# Patient Record
Sex: FEMALE | Race: WHITE | NOT HISPANIC OR LATINO | Employment: UNEMPLOYED | URBAN - METROPOLITAN AREA
[De-identification: names, ages, dates, MRNs, and addresses within clinical notes are randomized per-mention and may not be internally consistent; named-entity substitution may affect disease eponyms.]

---

## 2024-08-08 ENCOUNTER — HOSPITAL ENCOUNTER (INPATIENT)
Facility: HOSPITAL | Age: 44
LOS: 3 days | Discharge: NURSING, REHAB OR LONG-TERM CARE | End: 2024-08-13
Attending: STUDENT IN AN ORGANIZED HEALTH CARE EDUCATION/TRAINING PROGRAM | Admitting: INTERNAL MEDICINE
Payer: MEDICAID

## 2024-08-08 DIAGNOSIS — D69.6 THROMBOCYTOPENIA: ICD-10-CM

## 2024-08-08 DIAGNOSIS — K70.31 ALCOHOLIC CIRRHOSIS OF LIVER WITH ASCITES: Primary | ICD-10-CM

## 2024-08-08 DIAGNOSIS — N39.0 URINARY TRACT INFECTION WITHOUT HEMATURIA, SITE UNSPECIFIED: ICD-10-CM

## 2024-08-08 DIAGNOSIS — R60.1 ANASARCA: ICD-10-CM

## 2024-08-08 DIAGNOSIS — I95.9 HYPOTENSION, UNSPECIFIED HYPOTENSION TYPE: ICD-10-CM

## 2024-08-08 DIAGNOSIS — D64.9 ANEMIA, UNSPECIFIED TYPE: ICD-10-CM

## 2024-08-08 DIAGNOSIS — R74.8 ELEVATED LIVER ENZYMES: ICD-10-CM

## 2024-08-08 LAB
HOLD SPECIMEN: NORMAL
HOLD SPECIMEN: NORMAL
WHOLE BLOOD HOLD COAG: NORMAL
WHOLE BLOOD HOLD SPECIMEN: NORMAL

## 2024-08-08 PROCEDURE — 81001 URINALYSIS AUTO W/SCOPE: CPT

## 2024-08-08 PROCEDURE — 83880 ASSAY OF NATRIURETIC PEPTIDE: CPT

## 2024-08-08 PROCEDURE — 81025 URINE PREGNANCY TEST: CPT

## 2024-08-08 PROCEDURE — 82105 ALPHA-FETOPROTEIN SERUM: CPT | Performed by: NURSE PRACTITIONER

## 2024-08-08 PROCEDURE — 85025 COMPLETE CBC W/AUTO DIFF WBC: CPT

## 2024-08-08 PROCEDURE — 80053 COMPREHEN METABOLIC PANEL: CPT

## 2024-08-08 PROCEDURE — 25010000002 THIAMINE PER 100 MG

## 2024-08-08 PROCEDURE — 25010000002 ONDANSETRON PER 1 MG

## 2024-08-08 PROCEDURE — 99285 EMERGENCY DEPT VISIT HI MDM: CPT

## 2024-08-08 PROCEDURE — 25010000002 MORPHINE PER 10 MG

## 2024-08-08 PROCEDURE — 85007 BL SMEAR W/DIFF WBC COUNT: CPT

## 2024-08-08 PROCEDURE — 25810000003 SODIUM CHLORIDE 0.9 % SOLUTION

## 2024-08-08 PROCEDURE — 83690 ASSAY OF LIPASE: CPT

## 2024-08-08 PROCEDURE — 36415 COLL VENOUS BLD VENIPUNCTURE: CPT

## 2024-08-08 RX ORDER — FOLIC ACID 1 MG/1
1 TABLET ORAL DAILY
Status: DISCONTINUED | OUTPATIENT
Start: 2024-08-09 | End: 2024-08-13 | Stop reason: HOSPADM

## 2024-08-08 RX ORDER — LORAZEPAM 2 MG/ML
2 INJECTION INTRAMUSCULAR
Status: DISCONTINUED | OUTPATIENT
Start: 2024-08-08 | End: 2024-08-13 | Stop reason: HOSPADM

## 2024-08-08 RX ORDER — SODIUM CHLORIDE 0.9 % (FLUSH) 0.9 %
10 SYRINGE (ML) INJECTION AS NEEDED
Status: DISCONTINUED | OUTPATIENT
Start: 2024-08-08 | End: 2024-08-13 | Stop reason: HOSPADM

## 2024-08-08 RX ORDER — LORAZEPAM 1 MG/1
1 TABLET ORAL DAILY
Status: COMPLETED | OUTPATIENT
Start: 2024-08-12 | End: 2024-08-12

## 2024-08-08 RX ORDER — LORAZEPAM 1 MG/1
2 TABLET ORAL EVERY 6 HOURS
Status: DISPENSED | OUTPATIENT
Start: 2024-08-08 | End: 2024-08-09

## 2024-08-08 RX ORDER — THIAMINE HYDROCHLORIDE 100 MG/ML
200 INJECTION, SOLUTION INTRAMUSCULAR; INTRAVENOUS EVERY 8 HOURS SCHEDULED
Status: DISCONTINUED | OUTPATIENT
Start: 2024-08-08 | End: 2024-08-13 | Stop reason: HOSPADM

## 2024-08-08 RX ORDER — LORAZEPAM 2 MG/ML
1 INJECTION INTRAMUSCULAR
Status: DISCONTINUED | OUTPATIENT
Start: 2024-08-08 | End: 2024-08-13 | Stop reason: HOSPADM

## 2024-08-08 RX ORDER — LORAZEPAM 1 MG/1
1 TABLET ORAL EVERY 6 HOURS
Status: COMPLETED | OUTPATIENT
Start: 2024-08-09 | End: 2024-08-10

## 2024-08-08 RX ORDER — LORAZEPAM 1 MG/1
2 TABLET ORAL
Status: DISCONTINUED | OUTPATIENT
Start: 2024-08-08 | End: 2024-08-13 | Stop reason: HOSPADM

## 2024-08-08 RX ORDER — LORAZEPAM 1 MG/1
1 TABLET ORAL
Status: DISCONTINUED | OUTPATIENT
Start: 2024-08-08 | End: 2024-08-13 | Stop reason: HOSPADM

## 2024-08-08 RX ORDER — LORAZEPAM 1 MG/1
1 TABLET ORAL EVERY 12 HOURS SCHEDULED
Status: COMPLETED | OUTPATIENT
Start: 2024-08-10 | End: 2024-08-11

## 2024-08-08 RX ORDER — ONDANSETRON 2 MG/ML
4 INJECTION INTRAMUSCULAR; INTRAVENOUS ONCE
Status: COMPLETED | OUTPATIENT
Start: 2024-08-08 | End: 2024-08-08

## 2024-08-08 RX ADMIN — ONDANSETRON 4 MG: 2 INJECTION INTRAMUSCULAR; INTRAVENOUS at 23:55

## 2024-08-08 RX ADMIN — SODIUM CHLORIDE 1000 ML: 9 INJECTION, SOLUTION INTRAVENOUS at 23:53

## 2024-08-08 RX ADMIN — MORPHINE SULFATE 4 MG: 4 INJECTION, SOLUTION INTRAMUSCULAR; INTRAVENOUS at 23:57

## 2024-08-08 RX ADMIN — THIAMINE HYDROCHLORIDE 200 MG: 100 INJECTION, SOLUTION INTRAMUSCULAR; INTRAVENOUS at 23:58

## 2024-08-08 NOTE — Clinical Note
Level of Care: Telemetry [5]   Admitting Physician: LLANES ALVAREZ, CARLOS [[id number]]   Attending Physician: LLANES ALVAREZ, CARLOS [[id number]]

## 2024-08-09 ENCOUNTER — APPOINTMENT (OUTPATIENT)
Dept: ULTRASOUND IMAGING | Facility: HOSPITAL | Age: 44
End: 2024-08-09
Payer: MEDICAID

## 2024-08-09 ENCOUNTER — INPATIENT HOSPITAL (OUTPATIENT)
Dept: URBAN - METROPOLITAN AREA HOSPITAL 84 | Facility: HOSPITAL | Age: 44
End: 2024-08-09
Payer: MEDICAID

## 2024-08-09 ENCOUNTER — APPOINTMENT (OUTPATIENT)
Dept: CT IMAGING | Facility: HOSPITAL | Age: 44
End: 2024-08-09
Payer: MEDICAID

## 2024-08-09 ENCOUNTER — APPOINTMENT (OUTPATIENT)
Dept: INFUSION THERAPY | Facility: HOSPITAL | Age: 44
End: 2024-08-09
Payer: MEDICAID

## 2024-08-09 DIAGNOSIS — I85.00 ESOPHAGEAL VARICES WITHOUT BLEEDING: ICD-10-CM

## 2024-08-09 DIAGNOSIS — K70.11 ALCOHOLIC HEPATITIS WITH ASCITES: ICD-10-CM

## 2024-08-09 DIAGNOSIS — K70.31 ALCOHOLIC CIRRHOSIS OF LIVER WITH ASCITES: ICD-10-CM

## 2024-08-09 DIAGNOSIS — B19.20 UNSPECIFIED VIRAL HEPATITIS C WITHOUT HEPATIC COMA: ICD-10-CM

## 2024-08-09 DIAGNOSIS — K76.82 HEPATIC ENCEPHALOPATHY: ICD-10-CM

## 2024-08-09 DIAGNOSIS — D61.818 OTHER PANCYTOPENIA: ICD-10-CM

## 2024-08-09 DIAGNOSIS — D72.825 BANDEMIA: ICD-10-CM

## 2024-08-09 DIAGNOSIS — F10.10 ALCOHOL ABUSE, UNCOMPLICATED: ICD-10-CM

## 2024-08-09 PROBLEM — D63.8 ANEMIA OF CHRONIC DISEASE: Status: ACTIVE | Noted: 2024-07-29

## 2024-08-09 PROBLEM — K52.9 COLITIS: Status: ACTIVE | Noted: 2024-07-17

## 2024-08-09 PROBLEM — R18.8 CIRRHOSIS OF LIVER WITH ASCITES: Chronic | Status: ACTIVE | Noted: 2024-08-09

## 2024-08-09 PROBLEM — N94.89 PELVIC CONGESTION SYNDROME: Status: ACTIVE | Noted: 2023-07-28

## 2024-08-09 PROBLEM — K74.60 CIRRHOSIS OF LIVER WITH ASCITES: Chronic | Status: ACTIVE | Noted: 2024-08-09

## 2024-08-09 PROBLEM — B18.2 CHRONIC HEPATITIS C VIRUS INFECTION: Chronic | Status: ACTIVE | Noted: 2022-11-08

## 2024-08-09 PROBLEM — K70.10 ALCOHOLIC HEPATITIS: Status: ACTIVE | Noted: 2024-07-19

## 2024-08-09 PROBLEM — R79.89 ELEVATED LFTS: Chronic | Status: RESOLVED | Noted: 2019-11-06 | Resolved: 2024-08-09

## 2024-08-09 PROBLEM — K21.9 CHRONIC GERD: Status: ACTIVE | Noted: 2024-08-09

## 2024-08-09 PROBLEM — A41.9 SEPSIS WITHOUT ACUTE ORGAN DYSFUNCTION: Status: ACTIVE | Noted: 2022-11-03

## 2024-08-09 PROBLEM — I49.9 ARRHYTHMIA: Status: ACTIVE | Noted: 2024-08-09

## 2024-08-09 PROBLEM — D69.6 THROMBOCYTOPENIA: Status: ACTIVE | Noted: 2024-08-09

## 2024-08-09 PROBLEM — R60.1 ANASARCA: Status: ACTIVE | Noted: 2024-08-09

## 2024-08-09 PROBLEM — R18.8 CIRRHOSIS OF LIVER WITH ASCITES: Status: ACTIVE | Noted: 2024-08-09

## 2024-08-09 PROBLEM — K70.10 ALCOHOLIC HEPATITIS: Status: RESOLVED | Noted: 2024-07-19 | Resolved: 2024-08-09

## 2024-08-09 PROBLEM — B18.2 CHRONIC HEPATITIS C VIRUS INFECTION: Status: ACTIVE | Noted: 2022-11-08

## 2024-08-09 PROBLEM — J38.3 LESION OF VOCAL FOLD: Status: ACTIVE | Noted: 2024-07-29

## 2024-08-09 PROBLEM — K74.60 CIRRHOSIS OF LIVER WITH ASCITES: Status: ACTIVE | Noted: 2024-08-09

## 2024-08-09 PROBLEM — F33.1 MAJOR DEPRESSIVE DISORDER, RECURRENT EPISODE, MODERATE DEGREE: Chronic | Status: ACTIVE | Noted: 2024-02-17

## 2024-08-09 PROBLEM — D53.9 MACROCYTIC ANEMIA: Status: ACTIVE | Noted: 2024-08-09

## 2024-08-09 PROBLEM — I95.9 HYPOTENSION: Status: ACTIVE | Noted: 2024-08-09

## 2024-08-09 PROBLEM — K52.9 COLITIS: Status: RESOLVED | Noted: 2024-07-17 | Resolved: 2024-08-09

## 2024-08-09 PROBLEM — D63.8 ANEMIA OF CHRONIC DISEASE: Chronic | Status: ACTIVE | Noted: 2024-07-29

## 2024-08-09 PROBLEM — G62.9 NEUROPATHY: Status: ACTIVE | Noted: 2024-08-09

## 2024-08-09 PROBLEM — R79.89 ELEVATED LFTS: Chronic | Status: ACTIVE | Noted: 2019-11-06

## 2024-08-09 PROBLEM — I81 PORTAL VEIN THROMBOSIS: Status: ACTIVE | Noted: 2024-07-11

## 2024-08-09 PROBLEM — Z72.0 TOBACCO ABUSE: Chronic | Status: ACTIVE | Noted: 2020-05-16

## 2024-08-09 PROBLEM — A41.9 SEPSIS WITHOUT ACUTE ORGAN DYSFUNCTION: Status: RESOLVED | Noted: 2022-11-03 | Resolved: 2024-08-09

## 2024-08-09 LAB
ALBUMIN SERPL-MCNC: 2.6 G/DL (ref 3.5–5.2)
ALBUMIN SERPL-MCNC: 2.8 G/DL (ref 3.5–5.2)
ALBUMIN/GLOB SERPL: 0.7 G/DL
ALP SERPL-CCNC: 131 U/L (ref 39–117)
ALP SERPL-CCNC: 166 U/L (ref 39–117)
ALPHA-FETOPROTEIN: 5.83 NG/ML (ref 0–8.3)
ALT SERPL W P-5'-P-CCNC: 53 U/L (ref 1–33)
ALT SERPL W P-5'-P-CCNC: 61 U/L (ref 1–33)
AMMONIA BLD-SCNC: 125 UMOL/L (ref 11–51)
ANION GAP SERPL CALCULATED.3IONS-SCNC: 7.3 MMOL/L (ref 5–15)
ANION GAP SERPL CALCULATED.3IONS-SCNC: 9.6 MMOL/L (ref 5–15)
ANISOCYTOSIS BLD QL: ABNORMAL
ANISOCYTOSIS BLD QL: ABNORMAL
APPEARANCE FLD: CLEAR
AST SERPL-CCNC: 103 U/L (ref 1–32)
AST SERPL-CCNC: 121 U/L (ref 1–32)
B-HCG UR QL: NEGATIVE
BACTERIA UR QL AUTO: ABNORMAL /HPF
BASOPHILS # BLD MANUAL: 0.1 10*3/MM3 (ref 0–0.2)
BASOPHILS # BLD MANUAL: 0.13 10*3/MM3 (ref 0–0.2)
BASOPHILS NFR BLD MANUAL: 3 % (ref 0–1.5)
BASOPHILS NFR BLD MANUAL: 3 % (ref 0–1.5)
BILIRUB SERPL-MCNC: 3.8 MG/DL (ref 0–1.2)
BILIRUB SERPL-MCNC: 3.9 MG/DL (ref 0–1.2)
BILIRUB UR QL STRIP: ABNORMAL
BUN SERPL-MCNC: 12 MG/DL (ref 6–20)
BUN SERPL-MCNC: 9 MG/DL (ref 6–20)
BUN/CREAT SERPL: 10.5 (ref 7–25)
BUN/CREAT SERPL: 13.3 (ref 7–25)
CALCIUM SPEC-SCNC: 7.7 MG/DL (ref 8.6–10.5)
CALCIUM SPEC-SCNC: 8 MG/DL (ref 8.6–10.5)
CHLORIDE SERPL-SCNC: 106 MMOL/L (ref 98–107)
CHLORIDE SERPL-SCNC: 109 MMOL/L (ref 98–107)
CLARITY UR: ABNORMAL
CO2 SERPL-SCNC: 20.7 MMOL/L (ref 22–29)
CO2 SERPL-SCNC: 21.4 MMOL/L (ref 22–29)
COLOR FLD: NORMAL
COLOR UR: ABNORMAL
CREAT SERPL-MCNC: 0.86 MG/DL (ref 0.57–1)
CREAT SERPL-MCNC: 0.9 MG/DL (ref 0.57–1)
DEPRECATED RDW RBC AUTO: 62.7 FL (ref 37–54)
DEPRECATED RDW RBC AUTO: 63.2 FL (ref 37–54)
EGFRCR SERPLBLD CKD-EPI 2021: 80.5 ML/MIN/1.73
EGFRCR SERPLBLD CKD-EPI 2021: 85 ML/MIN/1.73
ELLIPTOCYTES BLD QL SMEAR: ABNORMAL
EOSINOPHIL # BLD MANUAL: 0.1 10*3/MM3 (ref 0–0.4)
EOSINOPHIL # BLD MANUAL: 0.22 10*3/MM3 (ref 0–0.4)
EOSINOPHIL NFR BLD MANUAL: 3 % (ref 0.3–6.2)
EOSINOPHIL NFR BLD MANUAL: 5 % (ref 0.3–6.2)
ERYTHROCYTE [DISTWIDTH] IN BLOOD BY AUTOMATED COUNT: 15.3 % (ref 12.3–15.4)
ERYTHROCYTE [DISTWIDTH] IN BLOOD BY AUTOMATED COUNT: 15.3 % (ref 12.3–15.4)
GLOBULIN UR ELPH-MCNC: 3.9 GM/DL
GLUCOSE SERPL-MCNC: 97 MG/DL (ref 65–99)
GLUCOSE SERPL-MCNC: 97 MG/DL (ref 65–99)
GLUCOSE UR STRIP-MCNC: NEGATIVE MG/DL
HAV IGM SERPL QL IA: ABNORMAL
HBV CORE IGM SERPL QL IA: ABNORMAL
HBV SURFACE AG SERPL QL IA: ABNORMAL
HCT VFR BLD AUTO: 26.4 % (ref 34–46.6)
HCT VFR BLD AUTO: 28.3 % (ref 34–46.6)
HCV AB SER QL: REACTIVE
HGB BLD-MCNC: 8.2 G/DL (ref 12–15.9)
HGB BLD-MCNC: 9.2 G/DL (ref 12–15.9)
HGB UR QL STRIP.AUTO: NEGATIVE
HYALINE CASTS UR QL AUTO: ABNORMAL /LPF
INR PPP: 1.5 (ref 0.93–1.1)
KETONES UR QL STRIP: ABNORMAL
LDH FLD-CCNC: 78 U/L
LEUKOCYTE ESTERASE UR QL STRIP.AUTO: ABNORMAL
LIPASE SERPL-CCNC: 57 U/L (ref 13–60)
LYMPHOCYTES # BLD MANUAL: 1.61 10*3/MM3 (ref 0.7–3.1)
LYMPHOCYTES # BLD MANUAL: 2.22 10*3/MM3 (ref 0.7–3.1)
LYMPHOCYTES NFR BLD MANUAL: 6 % (ref 5–12)
LYMPHOCYTES NFR BLD MANUAL: 8 % (ref 5–12)
LYMPHOCYTES NFR FLD MANUAL: 49 %
MACROCYTES BLD QL SMEAR: ABNORMAL
MACROCYTES BLD QL SMEAR: ABNORMAL
MAGNESIUM SERPL-MCNC: 1.6 MG/DL (ref 1.6–2.6)
MCH RBC QN AUTO: 34.9 PG (ref 26.6–33)
MCH RBC QN AUTO: 36.2 PG (ref 26.6–33)
MCHC RBC AUTO-ENTMCNC: 31.1 G/DL (ref 31.5–35.7)
MCHC RBC AUTO-ENTMCNC: 32.5 G/DL (ref 31.5–35.7)
MCV RBC AUTO: 111.4 FL (ref 79–97)
MCV RBC AUTO: 112.3 FL (ref 79–97)
MESOTHL CELL NFR FLD MANUAL: 12 %
METAMYELOCYTES NFR BLD MANUAL: 1 % (ref 0–0)
METHOD: NORMAL
MONOCYTES # BLD: 0.19 10*3/MM3 (ref 0.1–0.9)
MONOCYTES # BLD: 0.35 10*3/MM3 (ref 0.1–0.9)
MONOCYTES NFR FLD: 37 %
NEUTROPHILS # BLD AUTO: 1.19 10*3/MM3 (ref 1.7–7)
NEUTROPHILS # BLD AUTO: 1.51 10*3/MM3 (ref 1.7–7)
NEUTROPHILS NFR BLD MANUAL: 34 % (ref 42.7–76)
NEUTROPHILS NFR BLD MANUAL: 35 % (ref 42.7–76)
NEUTROPHILS NFR FLD MANUAL: 2 %
NEUTS BAND NFR BLD MANUAL: 2 % (ref 0–5)
NITRITE UR QL STRIP: POSITIVE
NT-PROBNP SERPL-MCNC: 154 PG/ML (ref 0–450)
NUC CELL # FLD: 119 /MM3
PH UR STRIP.AUTO: <=5 [PH] (ref 5–8)
PLATELET # BLD AUTO: 63 10*3/MM3 (ref 140–450)
PLATELET # BLD AUTO: 74 10*3/MM3 (ref 140–450)
PMV BLD AUTO: 10 FL (ref 6–12)
PMV BLD AUTO: 11.8 FL (ref 6–12)
POIKILOCYTOSIS BLD QL SMEAR: ABNORMAL
POTASSIUM SERPL-SCNC: 3.9 MMOL/L (ref 3.5–5.2)
POTASSIUM SERPL-SCNC: 4.2 MMOL/L (ref 3.5–5.2)
PROT SERPL-MCNC: 6.7 G/DL (ref 6–8.5)
PROT UR QL STRIP: ABNORMAL
PROTHROMBIN TIME: 15.9 SECONDS (ref 9.6–11.7)
RBC # BLD AUTO: 2.35 10*6/MM3 (ref 3.77–5.28)
RBC # BLD AUTO: 2.54 10*6/MM3 (ref 3.77–5.28)
RBC # UR STRIP: ABNORMAL /HPF
REF LAB TEST METHOD: ABNORMAL
SCAN SLIDE: NORMAL
SCAN SLIDE: NORMAL
SMALL PLATELETS BLD QL SMEAR: ABNORMAL
SMALL PLATELETS BLD QL SMEAR: ABNORMAL
SODIUM SERPL-SCNC: 137 MMOL/L (ref 136–145)
SODIUM SERPL-SCNC: 137 MMOL/L (ref 136–145)
SP GR UR STRIP: 1.03 (ref 1–1.03)
SQUAMOUS #/AREA URNS HPF: ABNORMAL /HPF
TARGETS BLD QL SMEAR: ABNORMAL
TSH SERPL DL<=0.05 MIU/L-ACNC: 2.88 UIU/ML (ref 0.27–4.2)
UROBILINOGEN UR QL STRIP: ABNORMAL
VARIANT LYMPHS NFR BLD MANUAL: 1 % (ref 0–5)
VARIANT LYMPHS NFR BLD MANUAL: 3 % (ref 0–5)
VARIANT LYMPHS NFR BLD MANUAL: 47 % (ref 19.6–45.3)
VARIANT LYMPHS NFR BLD MANUAL: 49 % (ref 19.6–45.3)
WBC # UR STRIP: ABNORMAL /HPF
WBC MORPH BLD: NORMAL
WBC MORPH BLD: NORMAL
WBC NRBC COR # BLD AUTO: 3.21 10*3/MM3 (ref 3.4–10.8)
WBC NRBC COR # BLD AUTO: 4.43 10*3/MM3 (ref 3.4–10.8)

## 2024-08-09 PROCEDURE — 82140 ASSAY OF AMMONIA: CPT

## 2024-08-09 PROCEDURE — 87070 CULTURE OTHR SPECIMN AEROBIC: CPT | Performed by: NURSE PRACTITIONER

## 2024-08-09 PROCEDURE — 85610 PROTHROMBIN TIME: CPT | Performed by: NURSE PRACTITIONER

## 2024-08-09 PROCEDURE — 82040 ASSAY OF SERUM ALBUMIN: CPT | Performed by: NURSE PRACTITIONER

## 2024-08-09 PROCEDURE — 87205 SMEAR GRAM STAIN: CPT | Performed by: NURSE PRACTITIONER

## 2024-08-09 PROCEDURE — 25010000002 CEFTRIAXONE PER 250 MG

## 2024-08-09 PROCEDURE — 87902 NFCT AGT GNTYP ALYS HEP C: CPT | Performed by: NURSE PRACTITIONER

## 2024-08-09 PROCEDURE — 84443 ASSAY THYROID STIM HORMONE: CPT | Performed by: NURSE PRACTITIONER

## 2024-08-09 PROCEDURE — 93005 ELECTROCARDIOGRAM TRACING: CPT | Performed by: INTERNAL MEDICINE

## 2024-08-09 PROCEDURE — G0378 HOSPITAL OBSERVATION PER HR: HCPCS

## 2024-08-09 PROCEDURE — 84450 TRANSFERASE (AST) (SGOT): CPT | Performed by: NURSE PRACTITIONER

## 2024-08-09 PROCEDURE — 85025 COMPLETE CBC W/AUTO DIFF WBC: CPT

## 2024-08-09 PROCEDURE — 80074 ACUTE HEPATITIS PANEL: CPT

## 2024-08-09 PROCEDURE — 82247 BILIRUBIN TOTAL: CPT | Performed by: NURSE PRACTITIONER

## 2024-08-09 PROCEDURE — 25010000002 FUROSEMIDE PER 20 MG: Performed by: INTERNAL MEDICINE

## 2024-08-09 PROCEDURE — 85007 BL SMEAR W/DIFF WBC COUNT: CPT

## 2024-08-09 PROCEDURE — 84075 ASSAY ALKALINE PHOSPHATASE: CPT | Performed by: NURSE PRACTITIONER

## 2024-08-09 PROCEDURE — 76705 ECHO EXAM OF ABDOMEN: CPT

## 2024-08-09 PROCEDURE — 25510000001 IOPAMIDOL PER 1 ML

## 2024-08-09 PROCEDURE — 25010000002 THIAMINE PER 100 MG

## 2024-08-09 PROCEDURE — 83615 LACTATE (LD) (LDH) ENZYME: CPT | Performed by: NURSE PRACTITIONER

## 2024-08-09 PROCEDURE — 99222 1ST HOSP IP/OBS MODERATE 55: CPT | Performed by: INTERNAL MEDICINE

## 2024-08-09 PROCEDURE — 87522 HEPATITIS C REVRS TRNSCRPJ: CPT | Performed by: NURSE PRACTITIONER

## 2024-08-09 PROCEDURE — 25010000002 HYDROMORPHONE 1 MG/ML SOLUTION: Performed by: NURSE PRACTITIONER

## 2024-08-09 PROCEDURE — 88108 CYTOPATH CONCENTRATE TECH: CPT | Performed by: NURSE PRACTITIONER

## 2024-08-09 PROCEDURE — 83735 ASSAY OF MAGNESIUM: CPT | Performed by: NURSE PRACTITIONER

## 2024-08-09 PROCEDURE — 80048 BASIC METABOLIC PNL TOTAL CA: CPT

## 2024-08-09 PROCEDURE — 76942 ECHO GUIDE FOR BIOPSY: CPT

## 2024-08-09 PROCEDURE — 84460 ALANINE AMINO (ALT) (SGPT): CPT | Performed by: NURSE PRACTITIONER

## 2024-08-09 PROCEDURE — 99222 1ST HOSP IP/OBS MODERATE 55: CPT | Performed by: NURSE PRACTITIONER

## 2024-08-09 PROCEDURE — 74177 CT ABD & PELVIS W/CONTRAST: CPT

## 2024-08-09 PROCEDURE — 93010 ELECTROCARDIOGRAM REPORT: CPT | Performed by: INTERNAL MEDICINE

## 2024-08-09 PROCEDURE — 89051 BODY FLUID CELL COUNT: CPT | Performed by: NURSE PRACTITIONER

## 2024-08-09 RX ORDER — BISACODYL 5 MG/1
5 TABLET, DELAYED RELEASE ORAL DAILY PRN
Status: DISCONTINUED | OUTPATIENT
Start: 2024-08-09 | End: 2024-08-13 | Stop reason: HOSPADM

## 2024-08-09 RX ORDER — LACTULOSE 10 G/15ML
30 SOLUTION ORAL 3 TIMES DAILY
Status: DISCONTINUED | OUTPATIENT
Start: 2024-08-09 | End: 2024-08-13 | Stop reason: HOSPADM

## 2024-08-09 RX ORDER — ALBUMIN (HUMAN) 12.5 G/50ML
112.5 SOLUTION INTRAVENOUS ONCE
Status: DISCONTINUED | OUTPATIENT
Start: 2024-08-09 | End: 2024-08-12

## 2024-08-09 RX ORDER — ZINC SULFATE 50(220)MG
220 CAPSULE ORAL DAILY
Status: DISCONTINUED | OUTPATIENT
Start: 2024-08-09 | End: 2024-08-13 | Stop reason: HOSPADM

## 2024-08-09 RX ORDER — ALBUMIN (HUMAN) 12.5 G/50ML
62.5 SOLUTION INTRAVENOUS ONCE
Status: DISCONTINUED | OUTPATIENT
Start: 2024-08-09 | End: 2024-08-12

## 2024-08-09 RX ORDER — LIDOCAINE HYDROCHLORIDE 10 MG/ML
10 INJECTION, SOLUTION INFILTRATION; PERINEURAL ONCE
Status: DISCONTINUED | OUTPATIENT
Start: 2024-08-09 | End: 2024-08-13 | Stop reason: HOSPADM

## 2024-08-09 RX ORDER — PROPRANOLOL HYDROCHLORIDE 10 MG/1
10 TABLET ORAL 3 TIMES DAILY
Status: ON HOLD | COMMUNITY
End: 2024-08-12

## 2024-08-09 RX ORDER — ONDANSETRON 2 MG/ML
4 INJECTION INTRAMUSCULAR; INTRAVENOUS EVERY 6 HOURS PRN
Status: DISCONTINUED | OUTPATIENT
Start: 2024-08-09 | End: 2024-08-13 | Stop reason: HOSPADM

## 2024-08-09 RX ORDER — ALBUMIN (HUMAN) 12.5 G/50ML
37.5 SOLUTION INTRAVENOUS ONCE
Status: DISCONTINUED | OUTPATIENT
Start: 2024-08-09 | End: 2024-08-12

## 2024-08-09 RX ORDER — POLYETHYLENE GLYCOL 3350 17 G/17G
17 POWDER, FOR SOLUTION ORAL DAILY PRN
Status: DISCONTINUED | OUTPATIENT
Start: 2024-08-09 | End: 2024-08-13 | Stop reason: HOSPADM

## 2024-08-09 RX ORDER — SPIRONOLACTONE 100 MG/1
100 TABLET, FILM COATED ORAL DAILY
Status: ON HOLD | COMMUNITY
End: 2024-08-12

## 2024-08-09 RX ORDER — SODIUM CHLORIDE 0.9 % (FLUSH) 0.9 %
10 SYRINGE (ML) INJECTION EVERY 12 HOURS SCHEDULED
Status: DISCONTINUED | OUTPATIENT
Start: 2024-08-09 | End: 2024-08-13 | Stop reason: HOSPADM

## 2024-08-09 RX ORDER — HYDROXYZINE HYDROCHLORIDE 25 MG/1
25 TABLET, FILM COATED ORAL 3 TIMES DAILY PRN
Status: ON HOLD | COMMUNITY
End: 2024-08-12

## 2024-08-09 RX ORDER — ALBUMIN (HUMAN) 12.5 G/50ML
100 SOLUTION INTRAVENOUS ONCE
Status: DISCONTINUED | OUTPATIENT
Start: 2024-08-09 | End: 2024-08-12

## 2024-08-09 RX ORDER — ALBUMIN (HUMAN) 12.5 G/50ML
87.5 SOLUTION INTRAVENOUS ONCE
Status: DISCONTINUED | OUTPATIENT
Start: 2024-08-09 | End: 2024-08-12

## 2024-08-09 RX ORDER — LACTULOSE 10 G/15ML
SOLUTION ORAL; RECTAL
Status: ON HOLD | COMMUNITY
Start: 2024-07-22 | End: 2024-08-12

## 2024-08-09 RX ORDER — FUROSEMIDE 10 MG/ML
40 INJECTION INTRAMUSCULAR; INTRAVENOUS
Status: DISCONTINUED | OUTPATIENT
Start: 2024-08-09 | End: 2024-08-13 | Stop reason: HOSPADM

## 2024-08-09 RX ORDER — FUROSEMIDE 40 MG/1
40 TABLET ORAL DAILY
Status: ON HOLD | COMMUNITY
End: 2024-08-12

## 2024-08-09 RX ORDER — SODIUM CHLORIDE 9 MG/ML
40 INJECTION, SOLUTION INTRAVENOUS AS NEEDED
Status: DISCONTINUED | OUTPATIENT
Start: 2024-08-09 | End: 2024-08-13 | Stop reason: HOSPADM

## 2024-08-09 RX ORDER — PANTOPRAZOLE SODIUM 40 MG/1
40 TABLET, DELAYED RELEASE ORAL 2 TIMES DAILY
Status: ON HOLD | COMMUNITY
End: 2024-08-12

## 2024-08-09 RX ORDER — MIRTAZAPINE 30 MG/1
30 TABLET, FILM COATED ORAL
Status: ON HOLD | COMMUNITY
Start: 2024-07-30 | End: 2024-08-12

## 2024-08-09 RX ORDER — TRAMADOL HYDROCHLORIDE 50 MG/1
50 TABLET ORAL EVERY 6 HOURS PRN
Status: DISCONTINUED | OUTPATIENT
Start: 2024-08-09 | End: 2024-08-13 | Stop reason: HOSPADM

## 2024-08-09 RX ORDER — AMOXICILLIN 250 MG
2 CAPSULE ORAL 2 TIMES DAILY PRN
Status: DISCONTINUED | OUTPATIENT
Start: 2024-08-09 | End: 2024-08-13 | Stop reason: HOSPADM

## 2024-08-09 RX ORDER — ALBUMIN (HUMAN) 12.5 G/50ML
75 SOLUTION INTRAVENOUS ONCE
Status: DISCONTINUED | OUTPATIENT
Start: 2024-08-09 | End: 2024-08-12

## 2024-08-09 RX ORDER — SODIUM CHLORIDE 0.9 % (FLUSH) 0.9 %
10 SYRINGE (ML) INJECTION AS NEEDED
Status: DISCONTINUED | OUTPATIENT
Start: 2024-08-09 | End: 2024-08-13 | Stop reason: HOSPADM

## 2024-08-09 RX ORDER — BISACODYL 10 MG
10 SUPPOSITORY, RECTAL RECTAL DAILY PRN
Status: DISCONTINUED | OUTPATIENT
Start: 2024-08-09 | End: 2024-08-13 | Stop reason: HOSPADM

## 2024-08-09 RX ORDER — GABAPENTIN 300 MG/1
300 CAPSULE ORAL 3 TIMES DAILY
Status: ON HOLD | COMMUNITY
End: 2024-08-12

## 2024-08-09 RX ORDER — MIDODRINE HYDROCHLORIDE 5 MG/1
10 TABLET ORAL EVERY 8 HOURS
Status: DISCONTINUED | OUTPATIENT
Start: 2024-08-09 | End: 2024-08-13

## 2024-08-09 RX ORDER — ALBUMIN (HUMAN) 12.5 G/50ML
50 SOLUTION INTRAVENOUS ONCE
Status: DISCONTINUED | OUTPATIENT
Start: 2024-08-09 | End: 2024-08-12

## 2024-08-09 RX ORDER — FOLIC ACID 1 MG/1
1 TABLET ORAL DAILY
Status: ON HOLD | COMMUNITY
End: 2024-08-12

## 2024-08-09 RX ORDER — OXYCODONE HYDROCHLORIDE 5 MG/1
5 CAPSULE ORAL EVERY 4 HOURS PRN
Status: ON HOLD | COMMUNITY
End: 2024-08-12

## 2024-08-09 RX ORDER — HYDROCODONE BITARTRATE AND ACETAMINOPHEN 5; 325 MG/1; MG/1
1 TABLET ORAL EVERY 6 HOURS PRN
Status: ON HOLD | COMMUNITY
End: 2024-08-12

## 2024-08-09 RX ORDER — SORBITOL SOLUTION 70 %
30 SOLUTION, ORAL MISCELLANEOUS ONCE
Status: COMPLETED | OUTPATIENT
Start: 2024-08-09 | End: 2024-08-09

## 2024-08-09 RX ADMIN — Medication 10 ML: at 06:09

## 2024-08-09 RX ADMIN — Medication 5 MG: at 20:20

## 2024-08-09 RX ADMIN — RIFAXIMIN 550 MG: 550 TABLET ORAL at 09:01

## 2024-08-09 RX ADMIN — TRAMADOL HYDROCHLORIDE 50 MG: 50 TABLET ORAL at 09:15

## 2024-08-09 RX ADMIN — Medication 10 ML: at 09:01

## 2024-08-09 RX ADMIN — FUROSEMIDE 40 MG: 10 INJECTION, SOLUTION INTRAMUSCULAR; INTRAVENOUS at 18:04

## 2024-08-09 RX ADMIN — MIDODRINE HYDROCHLORIDE 10 MG: 5 TABLET ORAL at 11:03

## 2024-08-09 RX ADMIN — Medication 10 ML: at 20:21

## 2024-08-09 RX ADMIN — LACTULOSE 30 G: 10 SOLUTION ORAL at 16:32

## 2024-08-09 RX ADMIN — CEFTRIAXONE 2000 MG: 2 INJECTION, POWDER, FOR SOLUTION INTRAMUSCULAR; INTRAVENOUS at 01:30

## 2024-08-09 RX ADMIN — IOPAMIDOL 100 ML: 755 INJECTION, SOLUTION INTRAVENOUS at 01:20

## 2024-08-09 RX ADMIN — MIDODRINE HYDROCHLORIDE 10 MG: 5 TABLET ORAL at 04:29

## 2024-08-09 RX ADMIN — LORAZEPAM 1 MG: 1 TABLET ORAL at 09:01

## 2024-08-09 RX ADMIN — THIAMINE HYDROCHLORIDE 200 MG: 100 INJECTION, SOLUTION INTRAMUSCULAR; INTRAVENOUS at 22:35

## 2024-08-09 RX ADMIN — THIAMINE HYDROCHLORIDE 200 MG: 100 INJECTION, SOLUTION INTRAMUSCULAR; INTRAVENOUS at 13:54

## 2024-08-09 RX ADMIN — HYDROMORPHONE HYDROCHLORIDE 1 MG: 1 INJECTION, SOLUTION INTRAMUSCULAR; INTRAVENOUS; SUBCUTANEOUS at 20:20

## 2024-08-09 RX ADMIN — FOLIC ACID 1 MG: 1 TABLET ORAL at 09:01

## 2024-08-09 RX ADMIN — FUROSEMIDE 40 MG: 10 INJECTION, SOLUTION INTRAMUSCULAR; INTRAVENOUS at 09:02

## 2024-08-09 RX ADMIN — TRAMADOL HYDROCHLORIDE 50 MG: 50 TABLET ORAL at 16:32

## 2024-08-09 RX ADMIN — ZINC SULFATE 220 MG (50 MG) CAPSULE 220 MG: CAPSULE at 16:36

## 2024-08-09 RX ADMIN — SORBITOL SOLUTION (BULK) 30 ML: 70 SOLUTION at 04:30

## 2024-08-09 RX ADMIN — LORAZEPAM 2 MG: 1 TABLET ORAL at 05:49

## 2024-08-09 RX ADMIN — LORAZEPAM 2 MG: 1 TABLET ORAL at 00:00

## 2024-08-09 RX ADMIN — RIFAXIMIN 550 MG: 550 TABLET ORAL at 20:20

## 2024-08-09 RX ADMIN — MIDODRINE HYDROCHLORIDE 10 MG: 5 TABLET ORAL at 18:04

## 2024-08-09 RX ADMIN — LACTULOSE 30 G: 10 SOLUTION ORAL at 09:01

## 2024-08-09 RX ADMIN — THIAMINE HYDROCHLORIDE 200 MG: 100 INJECTION, SOLUTION INTRAMUSCULAR; INTRAVENOUS at 05:49

## 2024-08-09 NOTE — NURSING NOTE
Report called to [facility] on 2A . Called ambulatory to let them know patient will go to room 201 after para.

## 2024-08-09 NOTE — NURSING NOTE
Contacted attending MD, Dr. Thornton via secure chat to inform him of noted bradycardia upon arrival to unit. Dr. Thornton instructed this nurse to monitor patient. Will continue to monitor patient closely.

## 2024-08-09 NOTE — NURSING NOTE
This nurse paged Dr. Thornton to inform him of patients heart rhythm change to AFIB with varying heart rate between 50-80. New telephone order for 1.) EKG 2.) consult cardiology. Orders repeated and placed in epic. Cardiology consult called to on-call provider Dr. Corbin.

## 2024-08-09 NOTE — CONSULTS
Referring Provider: Shay Thornton MD    Reason for Consultation: Atrial fibrillation      Patient Care Team:  Provider, No Known as PCP - General      SUBJECTIVE     Chief Complaint: Abdominal pain and swelling    History of present illness:  Maria Elena Byrnes is a 43 y.o. female with history of alcohol abuse, alcoholic cirrhosis with ascites and esophageal varices, hepatitis C and tobacco abuse who presented to the hospital with complaints of abdominal pain and swelling.  Workup revealed anasarca, hypotension, elevated LFTs, hyperbilirubinemia, anemia, thrombocytopenia and possible UTI.  GI is managing.  Cardiology has been consulted for rhythm management as patient appears to have gone into atrial fibrillation.    Review of systems:  Unable to obtain due to altered mental state      Personal History:      Past Medical History:   Diagnosis Date    Alcohol abuse 11/08/2022    Anemia of chronic disease 07/29/2024    Chronic GERD 08/09/2024    Chronic hepatitis C virus infection 11/08/2022    HAVP Negative 11/03/2022     HEPBSAG Negative 11/03/2022     HEPBCAB Negative 11/03/2022     HEPC10 Positive (A) 11/03/2022             Cirrhosis     Colitis 07/17/2024    Elevated LFTs 11/06/2019    Esophageal varices     Lesion of vocal fold 07/29/2024    Major depressive disorder, recurrent episode, moderate degree 02/17/2024    Neuropathy 08/09/2024    Pelvic congestion syndrome 07/28/2023    Portal vein thrombosis 07/11/2024    Sepsis without acute organ dysfunction 11/03/2022       History reviewed. No pertinent surgical history.    Family History   Family history unknown: Yes       Social History     Tobacco Use    Smoking status: Every Day     Types: Cigarettes    Smokeless tobacco: Current   Vaping Use    Vaping status: Every Day    Substances: Nicotine, THC, CBD, Flavoring    Devices: Disposable   Substance Use Topics    Alcohol use: Yes     Comment: Daily - 1-2 pints a day currently    Drug use: Yes     Types: Marijuana         Home meds:  Prior to Admission medications    Medication Sig Start Date End Date Taking? Authorizing Provider   folic acid (FOLVITE) 1 MG tablet Take 1 tablet by mouth Daily.  Patient not taking: Reported on 8/9/2024    Varsha Costello MD   furosemide (LASIX) 40 MG tablet Take 1 tablet by mouth Daily.  Patient not taking: Reported on 8/9/2024    Varsha Costello MD   gabapentin (NEURONTIN) 300 MG capsule Take 1 capsule by mouth 3 (Three) Times a Day.  Patient not taking: Reported on 8/9/2024    Varsha Costello MD   HYDROcodone-acetaminophen (NORCO) 5-325 MG per tablet Take 1 tablet by mouth Every 6 (Six) Hours As Needed.  Patient not taking: Reported on 8/9/2024    Varsha Costello MD   hydrOXYzine (ATARAX) 25 MG tablet Take 1 tablet by mouth 3 (Three) Times a Day As Needed for Itching.  Patient not taking: Reported on 8/9/2024    Varsha Costello MD   lactulose (CHRONULAC) 10 GM/15ML solution solution (encephalopathy)  7/22/24   Varsha Costello MD   mirtazapine (REMERON) 30 MG tablet 1 tablet.  Patient not taking: Reported on 8/9/2024 7/30/24   Varsha Costello MD   nicotine polacrilex (NICORETTE) 2 MG gum Take 1 each by mouth Every 2 (Two) Hours As Needed.  Patient not taking: Reported on 8/9/2024 7/30/24 10/28/24  Varsha Costello MD   oxyCODONE (OXY-IR) 5 MG capsule Take 1 capsule by mouth Every 4 (Four) Hours As Needed for Moderate Pain.  Patient not taking: Reported on 8/9/2024    Varsha Costello MD   pantoprazole (PROTONIX) 40 MG EC tablet Take 1 tablet by mouth 2 (Two) Times a Day.  Patient not taking: Reported on 8/9/2024    Varsha Costello MD   propranolol (INDERAL) 10 MG tablet Take 1 tablet by mouth 3 (Three) Times a Day.  Patient not taking: Reported on 8/9/2024    Varsha Costello MD   spironolactone (ALDACTONE) 100 MG tablet Take 1 tablet by mouth Daily.  Patient not taking: Reported on 8/9/2024    Varsha Costello MD        Allergies:     Patient has no known allergies.    Scheduled Meds:albumin human, 37.5 g, Intravenous, Once   Or  albumin human, 50 g, Intravenous, Once   Or  albumin human, 62.5 g, Intravenous, Once   Or  albumin human, 75 g, Intravenous, Once   Or  albumin human, 87.5 g, Intravenous, Once   Or  albumin human, 100 g, Intravenous, Once   Or  albumin human, 112.5 g, Intravenous, Once  [START ON 8/10/2024] cefTRIAXone, 2,000 mg, Intravenous, Daily  folic acid, 1 mg, Oral, Daily  furosemide, 40 mg, Intravenous, BID  lactulose, 30 g, Oral, TID  lidocaine, 10 mL, Subcutaneous, Once  LORazepam, 2 mg, Oral, Q6H   Followed by  LORazepam, 1 mg, Oral, Q6H   Followed by  [START ON 8/10/2024] LORazepam, 1 mg, Oral, Q12H   Followed by  [START ON 8/12/2024] LORazepam, 1 mg, Oral, Daily  midodrine, 10 mg, Oral, Q8H  rifAXIMin, 550 mg, Oral, Q12H  sodium chloride, 10 mL, Intravenous, Q12H  thiamine (B-1) IV, 200 mg, Intravenous, Q8H   Followed by  [START ON 8/14/2024] thiamine, 100 mg, Oral, Daily  zinc sulfate, 220 mg, Oral, Daily      Continuous Infusions:   PRN Meds:  senna-docusate sodium **AND** polyethylene glycol **AND** bisacodyl **AND** bisacodyl    Calcium Replacement - Follow Nurse / BPA Driven Protocol    HYDROmorphone    LORazepam **OR** LORazepam **OR** LORazepam **OR** LORazepam **OR** LORazepam **OR** LORazepam    Magnesium Standard Dose Replacement - Follow Nurse / BPA Driven Protocol    melatonin    ondansetron    phenol    Phosphorus Replacement - Follow Nurse / BPA Driven Protocol    Potassium Replacement - Follow Nurse / BPA Driven Protocol    sodium chloride    sodium chloride    sodium chloride    traMADol      OBJECTIVE    Vital Signs  Vitals:    08/09/24 1230 08/09/24 1245 08/09/24 1300 08/09/24 1342   BP: 106/65 96/56 95/58 112/71   BP Location:    Left arm   Patient Position:    Lying   Pulse: 59 58 59    Resp: 12 10 9    Temp:   97.6 °F (36.4 °C)    TempSrc:   Oral    SpO2: 96% 96% 96% 95%   Weight:   "     Height:           Flowsheet Rows      Flowsheet Row First Filed Value   Admission Height 170.2 cm (67\") Documented at 08/08/2024 2109   Admission Weight 93.4 kg (205 lb 14.6 oz) Documented at 08/08/2024 2109              Intake/Output Summary (Last 24 hours) at 8/9/2024 1552  Last data filed at 8/9/2024 1500  Gross per 24 hour   Intake --   Output 1200 ml   Net -1200 ml        Telemetry: Sinus bradycardia    Physical Exam:  The patient is lethargic and falling asleep midsentence.  No acute distress noted, chronically ill-appearing  Vital signs as noted above.  Head and neck revealed no carotid bruits or jugular venous distention.  No thyromegaly or lymphadenopathy is present  Lungs clear with diminished bases.  No wheezing.  Currently on room air  Heart: Normal first and second heart sounds. No murmur.  No precordial rub is present.  No gallop is present.  Abdomen: distended and tender  Extremities with good peripheral pulses with bilateral lower extremity edema  Skin: Warm and dry.  Musculoskeletal system is grossly normal.  CNS: Lethargic      Results Review:  I have personally reviewed the results from the time of this admission to 8/9/2024 15:52 EDT and agree with these findings:  [x]  Laboratory  [x]  Microbiology  [x]  Radiology  [x]  EKG/Telemetry   [x]  Cardiology/Vascular   []  Pathology  [x]  Old records  []  Other:    Most notable findings include:     Lab Results (last 24 hours)       Procedure Component Value Units Date/Time    TSH [[id number]] Collected: 08/09/24 1355    Specimen: Blood Updated: 08/09/24 1546    Magnesium [[id number]] Collected: 08/09/24 1355    Specimen: Blood Updated: 08/09/24 1546    Non-gynecologic Cytology [[id number]] Collected: 08/09/24 1225    Specimen: Peritoneal Fluid from Peritoneum Updated: 08/09/24 1434    Alkaline Phosphatase [[id number]]  (Abnormal) Collected: 08/09/24 1355    Specimen: Blood Updated: 08/09/24 1425     Alkaline Phosphatase 131 U/L     Albumin " [[id number]]  (Abnormal) Collected: 08/09/24 1355    Specimen: Blood Updated: 08/09/24 1425     Albumin 2.6 g/dL     AST [[id number]]  (Abnormal) Collected: 08/09/24 1355    Specimen: Blood Updated: 08/09/24 1425     AST (SGOT) 103 U/L     ALT [[id number]]  (Abnormal) Collected: 08/09/24 1355    Specimen: Blood Updated: 08/09/24 1425     ALT (SGPT) 53 U/L     Bilirubin, Total [[id number]]  (Abnormal) Collected: 08/09/24 1355    Specimen: Blood Updated: 08/09/24 1425     Total Bilirubin 3.8 mg/dL     Body Fluid Culture - Body Fluid, Peritoneum [[id number]] Collected: 08/09/24 1225    Specimen: Body Fluid from Peritoneum Updated: 08/09/24 1405     Gram Stain Rare (1+) WBCs seen      No organisms seen    Body Fluid Cell Count With Differential - Body Fluid, Peritoneum [[id number]] Collected: 08/09/24 1225    Specimen: Body Fluid from Peritoneum Updated: 08/09/24 1402    Narrative:      The following orders were created for panel order Body Fluid Cell Count With Differential - Body Fluid, Peritoneum.  Procedure                               Abnormality         Status                     ---------                               -----------         ------                     Body fluid cell count - ...[[id number]]                      Final result               Body fluid differential ...[[id number]]                      Final result                 Please view results for these tests on the individual orders.    Body fluid differential - Body Fluid, Peritoneum [[id number]] Collected: 08/09/24 1225    Specimen: Body Fluid from Peritoneum Updated: 08/09/24 1402     Neutrophils, Fluid 2 %      Lymphocytes, Fluid 49 %      Monocytes, Fluid 37 %      Mesothelial Cells, Fluid 12 %     Narrative:      Concentrated Smear by Cytocentrifuge Prep.    Body fluid cell count - Body Fluid, Peritoneum [[id number]] Collected: 08/09/24 1225    Specimen: Body Fluid from Peritoneum Updated: 08/09/24 1328     Color, Fluid Dark Yellow     Appearance,  Fluid Clear     Nucleated Cells, Fluid 119 /mm3      Method: Automated Sysmex XN Method    Narrative:      No reference range established. Physician to interpret results with clinical findings.    Lactate Dehydrogenase, Body Fluid - Peritoneal Fluid, Peritoneum [[id number]] Collected: 08/09/24 1225    Specimen: Peritoneal Fluid from Peritoneum Updated: 08/09/24 1309    Protime-INR [[id number]]  (Abnormal) Collected: 08/09/24 1041    Specimen: Blood from Arm, Right Updated: 08/09/24 1056     Protime 15.9 Seconds      INR 1.50    AFP Tumor Marker [[id number]] Collected: 08/08/24 2329    Specimen: Blood Updated: 08/09/24 1029    CBC & Differential [[id number]]  (Abnormal) Collected: 08/09/24 0546    Specimen: Blood Updated: 08/09/24 0705    Narrative:      The following orders were created for panel order CBC & Differential.  Procedure                               Abnormality         Status                     ---------                               -----------         ------                     CBC Auto Differential[[id number]]        Abnormal            Final result               Scan Slide[[id number]]                                       Final result                 Please view results for these tests on the individual orders.    CBC Auto Differential [[id number]]  (Abnormal) Collected: 08/09/24 0546    Specimen: Blood Updated: 08/09/24 0705     WBC 3.21 10*3/mm3      RBC 2.35 10*6/mm3      Hemoglobin 8.2 g/dL      Hematocrit 26.4 %      .3 fL      MCH 34.9 pg      MCHC 31.1 g/dL      RDW 15.3 %      RDW-SD 63.2 fl      MPV 10.0 fL      Platelets 63 10*3/mm3     Narrative:      The previously reported component NRBC is no longer being reported. Previous result was 0.0 /100 WBC (Reference Range: 0.0-0.2 /100 WBC) on 8/9/2024 at 0600 EDT.    Scan Slide [[id number]] Collected: 08/09/24 0546    Specimen: Blood Updated: 08/09/24 0705     Scan Slide --     Comment: See Manual Differential Results       Manual  Differential [[id number]]  (Abnormal) Collected: 08/09/24 0546    Specimen: Blood Updated: 08/09/24 0705     Neutrophil % 35.0 %      Lymphocyte % 49.0 %      Monocyte % 6.0 %      Eosinophil % 3.0 %      Basophil % 3.0 %      Bands %  2.0 %      Metamyelocyte % 1.0 %      Atypical Lymphocyte % 1.0 %      Neutrophils Absolute 1.19 10*3/mm3      Lymphocytes Absolute 1.61 10*3/mm3      Monocytes Absolute 0.19 10*3/mm3      Eosinophils Absolute 0.10 10*3/mm3      Basophils Absolute 0.10 10*3/mm3      Anisocytosis Slight/1+     Macrocytes Slight/1+     Poikilocytes Slight/1+     Target Cells Slight/1+     WBC Morphology Normal     Platelet Estimate Decreased    Basic Metabolic Panel [[id number]]  (Abnormal) Collected: 08/09/24 0546    Specimen: Blood Updated: 08/09/24 0615     Glucose 97 mg/dL      BUN 12 mg/dL      Creatinine 0.90 mg/dL      Sodium 137 mmol/L      Potassium 3.9 mmol/L      Chloride 109 mmol/L      CO2 20.7 mmol/L      Calcium 7.7 mg/dL      BUN/Creatinine Ratio 13.3     Anion Gap 7.3 mmol/L      eGFR 80.5 mL/min/1.73     Narrative:      GFR Normal >60  Chronic Kidney Disease <60  Kidney Failure <15      Ammonia [[id number]]  (Abnormal) Collected: 08/09/24 0546    Specimen: Blood Updated: 08/09/24 0609     Ammonia 125 umol/L     Hepatitis Panel, Acute [[id number]]  (Abnormal) Collected: 08/09/24 0504    Specimen: Blood Updated: 08/09/24 0538     Hepatitis B Surface Ag Non-Reactive     Hep A IgM Non-Reactive     Hep B C IgM Non-Reactive     Hepatitis C Ab Reactive    Narrative:      Results may be falsely decreased if patient taking Biotin.     Urinalysis, Microscopic Only - Urine, Clean Catch [[id number]]  (Abnormal) Collected: 08/08/24 2357    Specimen: Urine, Clean Catch Updated: 08/09/24 0027     RBC, UA 0-2 /HPF      WBC, UA 0-2 /HPF      Bacteria, UA Trace /HPF      Squamous Epithelial Cells, UA 31-50 /HPF      Hyaline Casts, UA 21-30 /LPF      Methodology Manual Light Microscopy    CBC & Differential  [[id number]]  (Abnormal) Collected: 08/08/24 2329    Specimen: Blood Updated: 08/09/24 0023    Narrative:      The following orders were created for panel order CBC & Differential.  Procedure                               Abnormality         Status                     ---------                               -----------         ------                     CBC Auto Differential[[id number]]        Abnormal            Final result               Scan Slide[[id number]]                                       Final result                 Please view results for these tests on the individual orders.    CBC Auto Differential [[id number]]  (Abnormal) Collected: 08/08/24 2329    Specimen: Blood Updated: 08/09/24 0023     WBC 4.43 10*3/mm3      RBC 2.54 10*6/mm3      Hemoglobin 9.2 g/dL      Hematocrit 28.3 %      .4 fL      MCH 36.2 pg      MCHC 32.5 g/dL      RDW 15.3 %      RDW-SD 62.7 fl      MPV 11.8 fL      Platelets 74 10*3/mm3      Comment: Result checked         Scan Slide [[id number]] Collected: 08/08/24 2329    Specimen: Blood Updated: 08/09/24 0023     Scan Slide --     Comment: See Manual Differential Results       Manual Differential [[id number]]  (Abnormal) Collected: 08/08/24 2329    Specimen: Blood Updated: 08/09/24 0023     Neutrophil % 34.0 %      Lymphocyte % 47.0 %      Monocyte % 8.0 %      Eosinophil % 5.0 %      Basophil % 3.0 %      Atypical Lymphocyte % 3.0 %      Neutrophils Absolute 1.51 10*3/mm3      Lymphocytes Absolute 2.22 10*3/mm3      Monocytes Absolute 0.35 10*3/mm3      Eosinophils Absolute 0.22 10*3/mm3      Basophils Absolute 0.13 10*3/mm3      Anisocytosis Slight/1+     Elliptocytes Slight/1+     Macrocytes Slight/1+     WBC Morphology Normal     Platelet Estimate Decreased    Urinalysis With Microscopic If Indicated (No Culture) - Urine, Clean Catch [[id number]]  (Abnormal) Collected: 08/08/24 2357    Specimen: Urine, Clean Catch Updated: 08/09/24 0012     Color, UA Dark Yellow      Appearance, UA Cloudy     pH, UA <=5.0     Specific Gravity, UA 1.030     Glucose, UA Negative     Ketones, UA Trace     Bilirubin, UA Large (3+)     Comment: Confirmation testing is unavailable.  A serum bilirubin is recommended for further assessment.        Blood, UA Negative     Protein, UA Trace     Leuk Esterase, UA Small (1+)     Nitrite, UA Positive     Urobilinogen, UA 1.0 E.U./dL    Pregnancy, Urine - Urine, Clean Catch [[id number]]  (Normal) Collected: 08/08/24 2357    Specimen: Urine, Clean Catch Updated: 08/09/24 0012     HCG, Urine QL Negative    Comprehensive Metabolic Panel [[id number]]  (Abnormal) Collected: 08/08/24 2329    Specimen: Blood Updated: 08/09/24 0008     Glucose 97 mg/dL      BUN 9 mg/dL      Creatinine 0.86 mg/dL      Sodium 137 mmol/L      Potassium 4.2 mmol/L      Comment: Slight hemolysis detected by analyzer. Result may be falsely elevated.        Chloride 106 mmol/L      CO2 21.4 mmol/L      Calcium 8.0 mg/dL      Total Protein 6.7 g/dL      Albumin 2.8 g/dL      ALT (SGPT) 61 U/L      AST (SGOT) 121 U/L      Comment: Slight hemolysis detected by analyzer. Result may be falsely elevated.        Alkaline Phosphatase 166 U/L      Total Bilirubin 3.9 mg/dL      Globulin 3.9 gm/dL      A/G Ratio 0.7 g/dL      BUN/Creatinine Ratio 10.5     Anion Gap 9.6 mmol/L      eGFR 85.0 mL/min/1.73     Narrative:      GFR Normal >60  Chronic Kidney Disease <60  Kidney Failure <15      Lipase [[id number]]  (Normal) Collected: 08/08/24 2329    Specimen: Blood Updated: 08/09/24 0007     Lipase 57 U/L     BNP [[id number]]  (Normal) Collected: 08/08/24 2329    Specimen: Blood Updated: 08/09/24 0007     proBNP 154.0 pg/mL     Narrative:      This assay is used as an aid in the diagnosis of individuals suspected of having heart failure. It can be used as an aid in the diagnosis of acute decompensated heart failure (ADHF) in patients presenting with signs and symptoms of ADHF to the emergency department (ED).  In addition, NT-proBNP of <300 pg/mL indicates ADHF is not likely.    Age Range Result Interpretation  NT-proBNP Concentration (pg/mL:      <50             Positive            >450                   Gray                 300-450                    Negative             <300    50-75           Positive            >900                  Gray                300-900                  Negative            <300      >75             Positive            >1800                  Gray                300-1800                  Negative            <300    [facility] Draw [[id number]] Collected: 08/08/24 2329    Specimen: Blood Updated: 08/08/24 2345    Narrative:      The following orders were created for panel order [facility] Draw.  Procedure                               Abnormality         Status                     ---------                               -----------         ------                     Green Top (Gel)[[id number]]                                  Final result               Lavender Top[[id number]]                                     Final result               Gold Top - SST[[id number]]                                   Final result               Light Blue Top[[id number]]                                   Final result                 Please view results for these tests on the individual orders.    Gold Top - SST [[id number]] Collected: 08/08/24 2329    Specimen: Blood Updated: 08/08/24 2345     Extra Tube Hold for add-ons.     Comment: Auto resulted.       Green Top (Gel) [[id number]] Collected: 08/08/24 2329    Specimen: Blood Updated: 08/08/24 2345     Extra Tube Hold for add-ons.     Comment: Auto resulted.       Lavender Top [[id number]] Collected: 08/08/24 2329    Specimen: Blood Updated: 08/08/24 2345     Extra Tube hold for add-on     Comment: Auto resulted       Light Blue Top [[id number]] Collected: 08/08/24 2329    Specimen: Blood Updated: 08/08/24 2345     Extra Tube Hold for add-ons.     Comment: Auto resulted                Imaging Results (Last 24 Hours)       Procedure Component Value Units Date/Time    Therapeutic Paracentesis With Radiology [[id number]] Resulted: 08/09/24 1129     Updated: 08/09/24 1129    CT Abdomen Pelvis With Contrast [[id number]] Collected: 08/09/24 0125     Updated: 08/09/24 0130    Narrative:      CT ABDOMEN PELVIS W CONTRAST    Date of Exam: 8/9/2024 1:00 AM EDT    Indication: Abdominal pain and swelling.    Comparison: None available.    Technique: Axial CT images were obtained of the abdomen and pelvis following the uneventful intravenous administration of iodinated contrast. Sagittal and coronal reconstructions were performed.  Automated exposure control and iterative reconstruction   methods were used.        Findings:  Lung Bases:     The visualized lung bases and lower mediastinal structures are unremarkable. Atelectatic changes are present within the left lung base.    Liver:  Liver is normal in size and CT density. No focal lesions. Small to moderate amount of ascites present throughout the abdomen and pelvis. Surface nodularity of the liver is present. Portal vein and its branches appear patent.    Biliary/Gallbladder:    The gallbladder is normal in caliber. Diffuse gallbladder wall thickening is present. No evidence of stones.. The biliary tree is nondilated.    Spleen:  Spleen is normal in size and CT density.    Pancreas:    Pancreas is normal. There is no evidence of pancreatic mass or peripancreatic fluid.    Kidneys:    Kidneys are normal in size. There are no stones or hydronephrosis.    Adrenals:    Adrenal glands are unremarkable.    Retroperitoneal/Lymph Nodes/Vasculature:    No retroperitoneal adenopathy is identified.    Gastrointestinal/Mesentery:    The bowel loops are non-dilated without wall thickening or mass. The appendix appears within normal limits. No evidence of obstruction. No free air. No mesenteric fluid collections identified. Moderate stool burden present  predominately within the right   hemicolon. There is anasarca of the soft tissues.    Bladder:    The bladder is normal.    Genital:     Unremarkable          Bony Structures:     Visualized bony structures are consistent with the patient's age.        Impression:      Impression:  1.Small to moderate amount of ascites present throughout the abdomen and pelvis with anasarca of the soft tissues likely related to volume overload or hepatic failure.  2.Surface nodularity of the liver consistent with cirrhosis. Portal vein is patent. No focal lesions.  3.Diffuse gallbladder wall thickening likely related to underlying liver disease. Acute cholecystitis cannot be excluded though less likely. No evidence of stones or biliary ductal dilatation.  4.Ancillary findings as described above.        Electronically Signed: Martine Wright MD    8/9/2024 1:28 AM EDT    Workstation ID: [id number]            LAB RESULTS (LAST 7 DAYS)    CBC  Results from last 7 days   Lab Units 08/09/24  0546 08/08/24  2329   WBC 10*3/mm3 3.21* 4.43   RBC 10*6/mm3 2.35* 2.54*   HEMOGLOBIN g/dL 8.2* 9.2*   HEMATOCRIT % 26.4* 28.3*   MCV fL 112.3* 111.4*   PLATELETS 10*3/mm3 63* 74*       BMP  Results from last 7 days   Lab Units 08/09/24  0546 08/08/24  2329   SODIUM mmol/L 137 137   POTASSIUM mmol/L 3.9 4.2   CHLORIDE mmol/L 109* 106   CO2 mmol/L 20.7* 21.4*   BUN mg/dL 12 9   CREATININE mg/dL 0.90 0.86   GLUCOSE mg/dL 97 97       CMP   Results from last 7 days   Lab Units 08/09/24  1355 08/09/24  0546 08/08/24  2329   SODIUM mmol/L  --  137 137   POTASSIUM mmol/L  --  3.9 4.2   CHLORIDE mmol/L  --  109* 106   CO2 mmol/L  --  20.7* 21.4*   BUN mg/dL  --  12 9   CREATININE mg/dL  --  0.90 0.86   GLUCOSE mg/dL  --  97 97   ALBUMIN g/dL 2.6*  --  2.8*   BILIRUBIN mg/dL 3.8*  --  3.9*   ALK PHOS U/L 131*  --  166*   AST (SGOT) U/L 103*  --  121*   ALT (SGPT) U/L 53*  --  61*   LIPASE U/L  --   --  57   AMMONIA umol/L  --  125*  --        BNP        TROPONIN         CoAg  Results from last 7 days   Lab Units 08/09/24  1041   INR  1.50*       Creatinine Clearance  Estimated Creatinine Clearance: 92.6 mL/min (by C-G formula based on SCr of 0.9 mg/dL).    ABG          Radiology  CT Abdomen Pelvis With Contrast    Result Date: 8/9/2024  Impression: 1.Small to moderate amount of ascites present throughout the abdomen and pelvis with anasarca of the soft tissues likely related to volume overload or hepatic failure. 2.Surface nodularity of the liver consistent with cirrhosis. Portal vein is patent. No focal lesions. 3.Diffuse gallbladder wall thickening likely related to underlying liver disease. Acute cholecystitis cannot be excluded though less likely. No evidence of stones or biliary ductal dilatation. 4.Ancillary findings as described above. Electronically Signed: Martine Wright MD  8/9/2024 1:28 AM EDT  Workstation ID: [id number]       EKG  I personally viewed and interpreted the patient's EKG/Telemetry data:  ECG 12 Lead Rhythm Change   Preliminary Result   HEART RATE=63  bpm   RR Nkhpbhrp=1776  ms   ME Bizkxsps=652  ms   P Horizontal Axis=28  deg   P Front Axis=31  deg   QRSD Jrrqunft=405  ms   QT Iljnyaqk=296  ms   LQnG=812  ms   QRS Axis=41  deg   T Wave Axis=43  deg   - OTHERWISE NORMAL ECG -   Sinus bradycardia   Ventricular premature complex   No previous ECG available for comparison   Date and Time of Study:2024-08-09 15:07:55            Echocardiogram:          Stress Test:        Cardiac Catheterization:  No results found for this or any previous visit.        Other:      ASSESSMENT & PLAN:    Principal Problem:    Alcoholic cirrhosis of liver with ascites  Active Problems:    Arrhythmia    Alcohol abuse    Chronic hepatitis C virus infection    Major depressive disorder, recurrent episode, moderate degree    Tobacco abuse    Esophageal varices    Anasarca    Thrombocytopenia    Macrocytic anemia    Hypotension      Alcoholic cirrhosis of liver with ascites and  Esophageal varices / Alcohol abuse  Anasarca   History of large volume paracentesis in the past  GI has been consulted for diagnostic and therapeutic paracentesis  Liver ultrasound  Ceftriaxone for SBP  IV diuretics with Lasix  Add Aldactone  On lactulose and rifaximin for hyperammonemia   Started on Ativan for CIWA protocol  Added folic acid, thiamine and zinc    Hypotension  Likely secondary to liver disease and possible sepsis  Started on midodrine  Albumin infusion as needed  Will closely monitor blood pressure    Arrhythmia  Questionable paroxysmal A-fib with slow ventricular rate  Currently in sinus rhythm  We will obtain an echocardiogram  K 3.9  Check Mg and TSH  Unable to tolerate beta-blocker due to bradycardia  Unable to tolerate anticoagulation due to esophageal varices, anemia, thrombocytopenia and high risk of bleeding.    Thrombocytopenia / Macrocytic anemia  Likely secondary to chronic liver disease  Platelets 63 and hemoglobin 8.2 today  INR is 1.5  Monitor CBC  Monitor for signs of bleeding    Chronic hepatitis C virus infection  Unsure if patient has received treatment in the past    Tobacco abuse  Smoking cessation counseling provided to the patient

## 2024-08-09 NOTE — PLAN OF CARE
Problem: Adult Inpatient Plan of Care  Goal: Plan of Care Review  Outcome: Ongoing, Progressing  Goal: Patient-Specific Goal (Individualized)  Outcome: Ongoing, Progressing  Goal: Absence of Hospital-Acquired Illness or Injury  Outcome: Ongoing, Progressing  Intervention: Identify and Manage Fall Risk  Recent Flowsheet Documentation  Taken 8/9/2024 0800 by Lyn Bailey RN  Safety Promotion/Fall Prevention:   safety round/check completed   room organization consistent   nonskid shoes/slippers when out of bed   lighting adjusted   clutter free environment maintained   assistive device/personal items within reach  Intervention: Prevent Skin Injury  Recent Flowsheet Documentation  Taken 8/9/2024 0800 by Lyn Bailey RN  Body Position: position changed independently  Intervention: Prevent and Manage VTE (Venous Thromboembolism) Risk  Recent Flowsheet Documentation  Taken 8/9/2024 0800 by Lyn Bailey RN  Activity Management: up ad roseanne  Goal: Optimal Comfort and Wellbeing  Outcome: Ongoing, Progressing  Intervention: Provide Person-Centered Care  Recent Flowsheet Documentation  Taken 8/9/2024 0800 by Lyn Bailey RN  Trust Relationship/Rapport:   choices provided   care explained   questions answered   questions encouraged   reassurance provided   thoughts/feelings acknowledged  Goal: Readiness for Transition of Care  Outcome: Ongoing, Progressing     Problem: Pain Acute  Goal: Acceptable Pain Control and Functional Ability  Outcome: Ongoing, Progressing  Intervention: Prevent or Manage Pain  Recent Flowsheet Documentation  Taken 8/9/2024 0800 by Lyn Bailey RN  Medication Review/Management: medications reviewed  Intervention: Optimize Psychosocial Wellbeing  Recent Flowsheet Documentation  Taken 8/9/2024 0800 by Lyn Bailey RN  Diversional Activities: television   Goal Outcome Evaluation:

## 2024-08-09 NOTE — ED PROVIDER NOTES
Subjective   Chief Complaint   Patient presents with    Abdominal Pain       History of Present Illness  Patient is a 43-year-old female who reports here today for acute abdominal pain with swelling in her abdomen and bilateral legs.  Patient is a resident at [facility] for alcohol.  She is initially from the [facility] but was brought to this [facility] rehab facility.  Patient reports that her drink of choice is gin and she normally drinks a pint or more of gin per day.  Patient has only had half a pint of gin today.  Patient reports that sometime in May or June she had a paracentesis is unsure how much fluid they drained off of her.  Patient does report known liver failure related to alcoholic cirrhosis.  Patient is a poor historian and is unable to advise on historical labs and information.  Review of Systems  Per HPI  History reviewed. No pertinent past medical history.    No Known Allergies    History reviewed. No pertinent surgical history.    History reviewed. No pertinent family history.    Social History     Socioeconomic History    Marital status: Significant Other           Objective   Physical Exam  Vitals and nursing note reviewed.   Constitutional:       General: She is not in acute distress.     Appearance: She is well-developed. She is obese. She is ill-appearing. She is not toxic-appearing or diaphoretic.   HENT:      Head: Normocephalic and atraumatic.      Mouth/Throat:      Mouth: Mucous membranes are moist.      Pharynx: Oropharynx is clear.   Eyes:      Extraocular Movements: Extraocular movements intact.      Pupils: Pupils are equal, round, and reactive to light.   Cardiovascular:      Rate and Rhythm: Normal rate and regular rhythm.      Heart sounds: Normal heart sounds.   Pulmonary:      Effort: Pulmonary effort is normal.      Breath sounds: Normal breath sounds.   Abdominal:      General: Bowel sounds are normal. There is distension. There is no abdominal bruit. There are  "no signs of injury.      Palpations: Abdomen is soft.      Tenderness: There is generalized abdominal tenderness. There is no right CVA tenderness, left CVA tenderness, guarding or rebound. Negative signs include Lanza's sign and McBurney's sign.   Genitourinary:     Rectum: Tenderness: .EDLABS.[id number].   Musculoskeletal:      Right lower leg: Tenderness present. Edema present.      Left lower leg: Tenderness present. Edema present.   Skin:     General: Skin is warm and dry.      Capillary Refill: Capillary refill takes 2 to 3 seconds.      Coloration: Skin is jaundiced.   Neurological:      General: No focal deficit present.      Mental Status: She is alert.   Psychiatric:         Mood and Affect: Mood normal.         Behavior: Behavior normal.         Procedures           ED Course      BP 95/60   Pulse 67   Temp 97.7 °F (36.5 °C) (Oral)   Resp 18   Ht 170.2 cm (67\")   Wt 93.4 kg (205 lb 14.6 oz)   SpO2 93%   BMI 32.25 kg/m²     BP 95/60   Pulse 67   Temp 97.7 °F (36.5 °C) (Oral)   Resp 18   Ht 170.2 cm (67\")   Wt 93.4 kg (205 lb 14.6 oz)   SpO2 93%   BMI 32.25 kg/m²   Labs Reviewed   COMPREHENSIVE METABOLIC PANEL - Abnormal; Notable for the following components:       Result Value    CO2 21.4 (*)     Calcium 8.0 (*)     Albumin 2.8 (*)     ALT (SGPT) 61 (*)     AST (SGOT) 121 (*)     Alkaline Phosphatase 166 (*)     Total Bilirubin 3.9 (*)     All other components within normal limits    Narrative:     GFR Normal >60  Chronic Kidney Disease <60  Kidney Failure <15     URINALYSIS W/ MICROSCOPIC IF INDICATED (NO CULTURE) - Abnormal; Notable for the following components:    Color, UA Dark Yellow (*)     Appearance, UA Cloudy (*)     Ketones, UA Trace (*)     Bilirubin, UA Large (3+) (*)     Protein, UA Trace (*)     Leuk Esterase, UA Small (1+) (*)     Nitrite, UA Positive (*)     All other components within normal limits   CBC WITH AUTO DIFFERENTIAL - Abnormal; Notable for the following " components:    RBC 2.54 (*)     Hemoglobin 9.2 (*)     Hematocrit 28.3 (*)     .4 (*)     MCH 36.2 (*)     RDW-SD 62.7 (*)     Platelets 74 (*)     All other components within normal limits   MANUAL DIFFERENTIAL - Abnormal; Notable for the following components:    Neutrophil % 34.0 (*)     Lymphocyte % 47.0 (*)     Basophil % 3.0 (*)     Neutrophils Absolute 1.51 (*)     All other components within normal limits   URINALYSIS, MICROSCOPIC ONLY - Abnormal; Notable for the following components:    Bacteria, UA Trace (*)     Squamous Epithelial Cells, UA 31-50 (*)     All other components within normal limits   LIPASE - Normal   PREGNANCY, URINE - Normal   BNP (IN-HOUSE) - Normal    Narrative:     This assay is used as an aid in the diagnosis of individuals suspected of having heart failure. It can be used as an aid in the diagnosis of acute decompensated heart failure (ADHF) in patients presenting with signs and symptoms of ADHF to the emergency department (ED). In addition, NT-proBNP of <300 pg/mL indicates ADHF is not likely.    Age Range Result Interpretation  NT-proBNP Concentration (pg/mL:      <50             Positive            >450                   Gray                 300-450                    Negative             <300    50-75           Positive            >900                  Gray                300-900                  Negative            <300      >75             Positive            >1800                  Gray                300-1800                  Negative            <300   RAINBOW DRAW    Narrative:     The following orders were created for panel order [place] Draw.  Procedure                               Abnormality         Status                     ---------                               -----------         ------                     Green Top (Gel)[[id number]]                                  Final result               Lavender Top[[id number]]                                     Final  result               Gold Top - SST[[id number]]                                   Final result               Light Blue Top[[id number]]                                   Final result                 Please view results for these tests on the individual orders.   SCAN SLIDE   AMMONIA   BASIC METABOLIC PANEL   CBC WITH AUTO DIFFERENTIAL   HEPATITIS PANEL, ACUTE   CBC AND DIFFERENTIAL    Narrative:     The following orders were created for panel order CBC & Differential.  Procedure                               Abnormality         Status                     ---------                               -----------         ------                     CBC Auto Differential[[id number]]        Abnormal            Final result               Scan Slide[[id number]]                                       Final result                 Please view results for these tests on the individual orders.   GREEN TOP   LAVENDER TOP   GOLD TOP - SST   LIGHT BLUE TOP   CBC AND DIFFERENTIAL    Narrative:     The following orders were created for panel order CBC & Differential.  Procedure                               Abnormality         Status                     ---------                               -----------         ------                     CBC Auto Differential[[id number]]                                                         Please view results for these tests on the individual orders.     Medications   sodium chloride 0.9 % flush 10 mL (has no administration in time range)   Magnesium Standard Dose Replacement - Follow Nurse / BPA Driven Protocol (has no administration in time range)   thiamine (B-1) injection 200 mg (200 mg Intravenous Given 8/8/24 2358)     Followed by   thiamine (VITAMIN B-1) tablet 100 mg (has no administration in time range)   folic acid (FOLVITE) tablet 1 mg (has no administration in time range)   LORazepam (ATIVAN) tablet 2 mg (2 mg Oral Given 8/9/24 0000)     Followed by   LORazepam (ATIVAN) tablet 1 mg (has no  administration in time range)     Followed by   LORazepam (ATIVAN) tablet 1 mg (has no administration in time range)     Followed by   LORazepam (ATIVAN) tablet 1 mg (has no administration in time range)   LORazepam (ATIVAN) tablet 1 mg (has no administration in time range)     Or   LORazepam (ATIVAN) injection 1 mg (has no administration in time range)     Or   LORazepam (ATIVAN) tablet 2 mg (has no administration in time range)     Or   LORazepam (ATIVAN) injection 2 mg (has no administration in time range)     Or   LORazepam (ATIVAN) injection 2 mg (has no administration in time range)     Or   LORazepam (ATIVAN) injection 2 mg (has no administration in time range)   sorbitol solution 30 mL (has no administration in time range)   Potassium Replacement - Follow Nurse / BPA Driven Protocol (has no administration in time range)   Phosphorus Replacement - Follow Nurse / BPA Driven Protocol (has no administration in time range)   Calcium Replacement - Follow Nurse / BPA Driven Protocol (has no administration in time range)   sodium chloride 0.9 % flush 10 mL (has no administration in time range)   sodium chloride 0.9 % flush 10 mL (has no administration in time range)   sodium chloride 0.9 % infusion 40 mL (has no administration in time range)   sennosides-docusate (PERICOLACE) 8.6-50 MG per tablet 2 tablet (has no administration in time range)     And   polyethylene glycol (MIRALAX) packet 17 g (has no administration in time range)     And   bisacodyl (DULCOLAX) EC tablet 5 mg (has no administration in time range)     And   bisacodyl (DULCOLAX) suppository 10 mg (has no administration in time range)   ondansetron (ZOFRAN) injection 4 mg (has no administration in time range)   melatonin tablet 5 mg (has no administration in time range)   midodrine (PROAMATINE) tablet 10 mg (has no administration in time range)   riFAXIMin (XIFAXAN) tablet 550 mg (has no administration in time range)   lactulose (CHRONULAC) 10 GM/15ML  solution 30 g (has no administration in time range)   sodium chloride 0.9 % bolus 1,000 mL (0 mL Intravenous Stopped 8/9/24 0129)   morphine injection 4 mg (4 mg Intravenous Given 8/8/24 2357)   ondansetron (ZOFRAN) injection 4 mg (4 mg Intravenous Given 8/8/24 2355)   cefTRIAXone (ROCEPHIN) 2,000 mg in sodium chloride 0.9 % 100 mL MBP (0 mg Intravenous Stopped 8/9/24 0239)   iopamidol (ISOVUE-370) 76 % injection 100 mL (100 mL Intravenous Given 8/9/24 0120)     CT Abdomen Pelvis With Contrast    Result Date: 8/9/2024  Impression: 1.Small to moderate amount of ascites present throughout the abdomen and pelvis with anasarca of the soft tissues likely related to volume overload or hepatic failure. 2.Surface nodularity of the liver consistent with cirrhosis. Portal vein is patent. No focal lesions. 3.Diffuse gallbladder wall thickening likely related to underlying liver disease. Acute cholecystitis cannot be excluded though less likely. No evidence of stones or biliary ductal dilatation. 4.Ancillary findings as described above. Electronically Signed: Martine Wright MD  8/9/2024 1:28 AM EDT  Workstation ID: [id number]                                            Medical Decision Making  Problems Addressed:  Alcoholic cirrhosis of liver with ascites: complicated acute illness or injury  Anasarca: complicated acute illness or injury  Anemia, unspecified type: complicated acute illness or injury  Elevated liver enzymes: complicated acute illness or injury  Hypotension, unspecified hypotension type: complicated acute illness or injury  Thrombocytopenia: complicated acute illness or injury  Urinary tract infection without hematuria, site unspecified: complicated acute illness or injury    Amount and/or Complexity of Data Reviewed  Labs: ordered.  Radiology: ordered.    Risk  OTC drugs.  Prescription drug management.  Decision regarding hospitalization.    Patient presented with abdominal pain and bilateral lower leg edema.   History obtained from patient.    Labs reviewed:  Labs Reviewed   COMPREHENSIVE METABOLIC PANEL - Abnormal; Notable for the following components:       Result Value    CO2 21.4 (*)     Calcium 8.0 (*)     Albumin 2.8 (*)     ALT (SGPT) 61 (*)     AST (SGOT) 121 (*)     Alkaline Phosphatase 166 (*)     Total Bilirubin 3.9 (*)     All other components within normal limits    Narrative:     GFR Normal >60  Chronic Kidney Disease <60  Kidney Failure <15     URINALYSIS W/ MICROSCOPIC IF INDICATED (NO CULTURE) - Abnormal; Notable for the following components:    Color, UA Dark Yellow (*)     Appearance, UA Cloudy (*)     Ketones, UA Trace (*)     Bilirubin, UA Large (3+) (*)     Protein, UA Trace (*)     Leuk Esterase, UA Small (1+) (*)     Nitrite, UA Positive (*)     All other components within normal limits   CBC WITH AUTO DIFFERENTIAL - Abnormal; Notable for the following components:    RBC 2.54 (*)     Hemoglobin 9.2 (*)     Hematocrit 28.3 (*)     .4 (*)     MCH 36.2 (*)     RDW-SD 62.7 (*)     Platelets 74 (*)     All other components within normal limits   MANUAL DIFFERENTIAL - Abnormal; Notable for the following components:    Neutrophil % 34.0 (*)     Lymphocyte % 47.0 (*)     Basophil % 3.0 (*)     Neutrophils Absolute 1.51 (*)     All other components within normal limits   URINALYSIS, MICROSCOPIC ONLY - Abnormal; Notable for the following components:    Bacteria, UA Trace (*)     Squamous Epithelial Cells, UA 31-50 (*)     All other components within normal limits   LIPASE - Normal   PREGNANCY, URINE - Normal   BNP (IN-HOUSE) - Normal    Narrative:     This assay is used as an aid in the diagnosis of individuals suspected of having heart failure. It can be used as an aid in the diagnosis of acute decompensated heart failure (ADHF) in patients presenting with signs and symptoms of ADHF to the emergency department (ED). In addition, NT-proBNP of <300 pg/mL indicates ADHF is not likely.    Age  Range Result Interpretation  NT-proBNP Concentration (pg/mL:      <50             Positive            >450                   Gray                 300-450                    Negative             <300    50-75           Positive            >900                  Gray                300-900                  Negative            <300      >75             Positive            >1800                  Gray                300-1800                  Negative            <300   RAINBOW DRAW    Narrative:     The following orders were created for panel order [place] Draw.  Procedure                               Abnormality         Status                     ---------                               -----------         ------                     Green Top (Gel)[[id number]]                                  Final result               Lavender Top[[id number]]                                     Final result               Gold Top - SST[[id number]]                                   Final result               Light Blue Top[[id number]]                                   Final result                 Please view results for these tests on the individual orders.   SCAN SLIDE   AMMONIA   BASIC METABOLIC PANEL   CBC WITH AUTO DIFFERENTIAL   HEPATITIS PANEL, ACUTE   CBC AND DIFFERENTIAL    Narrative:     The following orders were created for panel order CBC & Differential.  Procedure                               Abnormality         Status                     ---------                               -----------         ------                     CBC Auto Differential[[id number]]        Abnormal            Final result               Scan Slide[[id number]]                                       Final result                 Please view results for these tests on the individual orders.   GREEN TOP   LAVENDER TOP   GOLD TOP - SST   LIGHT BLUE TOP   CBC AND DIFFERENTIAL    Narrative:     The following orders were created for panel order CBC &  Differential.  Procedure                               Abnormality         Status                     ---------                               -----------         ------                     CBC Auto Differential[[id number]]                                                         Please view results for these tests on the individual orders.         Imaging reviewed: CT Abdomen Pelvis With Contrast    Result Date: 8/9/2024  Impression: 1.Small to moderate amount of ascites present throughout the abdomen and pelvis with anasarca of the soft tissues likely related to volume overload or hepatic failure. 2.Surface nodularity of the liver consistent with cirrhosis. Portal vein is patent. No focal lesions. 3.Diffuse gallbladder wall thickening likely related to underlying liver disease. Acute cholecystitis cannot be excluded though less likely. No evidence of stones or biliary ductal dilatation. 4.Ancillary findings as described above. Electronically Signed: Martine Wright MD  8/9/2024 1:28 AM EDT  Workstation ID: [id number]     Attempted to review previous records however patient is from [place].  Could see some labs however not enough to piece together full story.    Differential diagnosis considered: Alcohol withdrawal, alcoholic cirrhosis, congestive heart failure    Patient was treated with Zofran, morphine, fluid bolus, IV Rocephin, Ativan and thiamine injection while in the emergency room.     IV established labs were obtained to show a CBC with hemoglobin 9.2 hematocrit of 28.3 platelets of 74.No recent labs to compare with the exception of a hemoglobin of 8.5 recorded 10 days ago.  CMP shows calcium of 8 albumin of 2.8 ALT 61  alk phos 166 total bilirubin 3.9.  Patient has known cirrhosis of the liver related to Alcohol abuse.  8 days ago patient labs to show calcium of 7.8 however CMP not drawn just a basic therefore cannot confirm with albumin, ALT, AST, alk phos, or bilirubin.  Urinalysis shows dark cloudy  urine with trace ketones large bili trace protein 1+ leukocytes nitrite positive with trace bacteria and 31-50 squamous epithelials.  As patient is symptomatic and nitrite positive treated with IV Rocephin in the ED.  Admitting provider to follow-up.    All labs and results with patient as well as CT of abdomen pelvis to show as above.  Will admit patient based on presentation labs and CT results for further evaluation workup and management.  Patient agreeable to this plan and ready for admission at this time.    Discussed case with MARC Arteaga hospitalist, who agreed to admit patient.     Final diagnoses:   Elevated liver enzymes   Urinary tract infection without hematuria, site unspecified   Anemia, unspecified type   Thrombocytopenia   Anasarca   Hypotension, unspecified hypotension type   Alcoholic cirrhosis of liver with ascites       ED Disposition  ED Disposition       ED Disposition   Decision to Admit    Condition   --    Comment   Level of Care: Med/Surg [1]   Diagnosis: Cirrhosis of liver with ascites [0200306]                 No follow-up provider specified.       Medication List      No changes were made to your prescriptions during this visit.            Kimmie Alejo APRN  08/09/24 0428

## 2024-08-09 NOTE — CONSULTS
Patient Care Team:  Provider, No Known as PCP - General    Chief complaint:    Abdominal pain, cirrhosis of the liver with ascites     Subjective   Abdominal pain     History of present illness:     Patient is a 43 year old female with a history of ETOH cirrhosis complicated by esophageal varices & HE who was sent in from [facility] rehab for abdominal pain. Patient was found to have an elevated ammonia level and liver function test.  CT of the abdomen pelvis were done that showed moderate amount of ascites and cirrhotic liver.  GI was consulted for abdominal pain with cirrhosis of the liver with ascites.  Patient states she has known she had cirrhosis for a couple of years and may have had a paracentesis in the past.  Patient states she had a EGD and colonoscopy at [facility] maybe in May 2024.  She does not think she had variceal banding in the past.  Patient states her last drink of alcohol was yesterday and her drink of choice is GYN.  Patient denies any overt bleeding.    LABS: Ammonia level to 125 this morning.  Sodium and potassium are normal.  Creatinine 0.9.  Total bilirubin 3.8, alk phos 166, , ALT 61.  WBCs 3.21, hemoglobin 8.2 with an MCV of 112, platelets are 63. 2 Bands. Hepatitis panel shows hepatitis C antibody positive.  hCG negative.    Endo History:  2024 EGD/colonoscopy possibly in May at [facility].  Patient reports esophageal varices not banded.  Will ask medical records to try to retrieve this report.      Past Medical History:  Past Medical History:   Diagnosis Date    Cirrhosis     Esophageal varices        Past Surgical History:  History reviewed. No pertinent surgical history.    Social History:  Social History     Tobacco Use    Smoking status: Every Day     Types: Cigarettes    Smokeless tobacco: Current   Vaping Use    Vaping status: Every Day    Substances: Nicotine, THC, CBD, Flavoring    Devices: Disposable   Substance Use Topics    Alcohol use: Yes     Comment:  Daily - 1-2 pints a day currently    Drug use: Yes     Types: Marijuana       Family History:  History reviewed. No pertinent family history.    Medications:  (Not in a hospital admission)      Scheduled Meds:albumin human, 37.5 g, Intravenous, Once   Or  albumin human, 50 g, Intravenous, Once   Or  albumin human, 62.5 g, Intravenous, Once   Or  albumin human, 75 g, Intravenous, Once   Or  albumin human, 87.5 g, Intravenous, Once   Or  albumin human, 100 g, Intravenous, Once   Or  albumin human, 112.5 g, Intravenous, Once  folic acid, 1 mg, Oral, Daily  furosemide, 40 mg, Intravenous, BID  lactulose, 30 g, Oral, TID  LORazepam, 2 mg, Oral, Q6H   Followed by  LORazepam, 1 mg, Oral, Q6H   Followed by  [START ON 8/10/2024] LORazepam, 1 mg, Oral, Q12H   Followed by  [START ON 8/12/2024] LORazepam, 1 mg, Oral, Daily  midodrine, 10 mg, Oral, Q8H  rifAXIMin, 550 mg, Oral, Q12H  sodium chloride, 10 mL, Intravenous, Q12H  thiamine (B-1) IV, 200 mg, Intravenous, Q8H   Followed by  [START ON 8/14/2024] thiamine, 100 mg, Oral, Daily      Continuous Infusions:   PRN Meds:.  senna-docusate sodium **AND** polyethylene glycol **AND** bisacodyl **AND** bisacodyl    Calcium Replacement - Follow Nurse / BPA Driven Protocol    LORazepam **OR** LORazepam **OR** LORazepam **OR** LORazepam **OR** LORazepam **OR** LORazepam    Magnesium Standard Dose Replacement - Follow Nurse / BPA Driven Protocol    melatonin    ondansetron    Phosphorus Replacement - Follow Nurse / BPA Driven Protocol    Potassium Replacement - Follow Nurse / BPA Driven Protocol    sodium chloride    sodium chloride    sodium chloride    traMADol    ALLERGIES:  Patient has no known allergies.    ROS:  Abdominal pain, abdominal swelling, peripheral edema  The following systems were reviewed and negative;   Constitution:  No fevers, chills, no unintentional weight loss  Skin: no rash, no jaundice  Eyes:  No blurry vision, no eye pain  HENT:  No change in hearing or  smell  Resp:  No dyspnea or cough  CV:  No chest pain or palpitations  :  No dysuria, hematuria  Musculoskeletal:  No leg cramps or arthralgias  Neuro:  No tremor, no numbness  Psych:  No depression or confsuion    Objective   Drowsy in bed.  ER 34.    Vital Signs:   Vitals:    08/09/24 0401 08/09/24 0416 08/09/24 0606 08/09/24 0700   BP: 95/60 93/59 98/61    BP Location:   Left arm    Patient Position:   Lying    Pulse: 67 68 65    Resp:   17 16   Temp:   97.6 °F (36.4 °C) 97.9 °F (36.6 °C)   TempSrc:   Oral Oral   SpO2: 93% 93% 93%    Weight:       Height:           Physical Exam:   General Appearance:    Awake and alert, in no acute distress   Head:    Normocephalic, without obvious abnormality, atraumatic   Eyes:            Conjunctivae normal, anicteric sclera, pupils equal   Ears:    Ears appear intact with no abnormalities noted   Throat:   No oral lesions, no thrush, oral mucosa moist   Neck:   Supple, no JVD   Lungs:      respirations regular, even and unlabored        Chest Wall:    No abnormalities observed   Abdomen:      soft, non-tender, no rebound or guarding, non-distended, no hepatosplenomegaly   Rectal:     Deferred   Extremities:   Moves all extremities, no edema, no cyanosis   Pulses:   Pulses palpable and equal bilaterally   Skin:   No rash, no jaundice, normal palpaion        Neurologic:   Cranial nerves 2 - 12 grossly intact, no asterixis     Results Review:   I reviewed the patient's labs and imaging.  Lab Results (last 24 hours)       Procedure Component Value Units Date/Time    CBC & Differential [[id number]]  (Abnormal) Collected: 08/09/24 0546    Specimen: Blood Updated: 08/09/24 0705    Narrative:      The following orders were created for panel order CBC & Differential.  Procedure                               Abnormality         Status                     ---------                               -----------         ------                     CBC Auto Differential[[id number]]         Abnormal            Final result               Scan Slide[[id number]]                                       Final result                 Please view results for these tests on the individual orders.    CBC Auto Differential [[id number]]  (Abnormal) Collected: 08/09/24 0546    Specimen: Blood Updated: 08/09/24 0705     WBC 3.21 10*3/mm3      RBC 2.35 10*6/mm3      Hemoglobin 8.2 g/dL      Hematocrit 26.4 %      .3 fL      MCH 34.9 pg      MCHC 31.1 g/dL      RDW 15.3 %      RDW-SD 63.2 fl      MPV 10.0 fL      Platelets 63 10*3/mm3     Narrative:      The previously reported component NRBC is no longer being reported. Previous result was 0.0 /100 WBC (Reference Range: 0.0-0.2 /100 WBC) on 8/9/2024 at 0600 EDT.    Scan Slide [[id number]] Collected: 08/09/24 0546    Specimen: Blood Updated: 08/09/24 0705     Scan Slide --     Comment: See Manual Differential Results       Manual Differential [[id number]]  (Abnormal) Collected: 08/09/24 0546    Specimen: Blood Updated: 08/09/24 0705     Neutrophil % 35.0 %      Lymphocyte % 49.0 %      Monocyte % 6.0 %      Eosinophil % 3.0 %      Basophil % 3.0 %      Bands %  2.0 %      Metamyelocyte % 1.0 %      Atypical Lymphocyte % 1.0 %      Neutrophils Absolute 1.19 10*3/mm3      Lymphocytes Absolute 1.61 10*3/mm3      Monocytes Absolute 0.19 10*3/mm3      Eosinophils Absolute 0.10 10*3/mm3      Basophils Absolute 0.10 10*3/mm3      Anisocytosis Slight/1+     Macrocytes Slight/1+     Poikilocytes Slight/1+     Target Cells Slight/1+     WBC Morphology Normal     Platelet Estimate Decreased    Basic Metabolic Panel [[id number]]  (Abnormal) Collected: 08/09/24 0546    Specimen: Blood Updated: 08/09/24 0615     Glucose 97 mg/dL      BUN 12 mg/dL      Creatinine 0.90 mg/dL      Sodium 137 mmol/L      Potassium 3.9 mmol/L      Chloride 109 mmol/L      CO2 20.7 mmol/L      Calcium 7.7 mg/dL      BUN/Creatinine Ratio 13.3     Anion Gap 7.3 mmol/L      eGFR 80.5 mL/min/1.73      Narrative:      GFR Normal >60  Chronic Kidney Disease <60  Kidney Failure <15      Ammonia [[id number]]  (Abnormal) Collected: 08/09/24 0546    Specimen: Blood Updated: 08/09/24 0609     Ammonia 125 umol/L     Hepatitis Panel, Acute [[id number]]  (Abnormal) Collected: 08/09/24 0504    Specimen: Blood Updated: 08/09/24 0538     Hepatitis B Surface Ag Non-Reactive     Hep A IgM Non-Reactive     Hep B C IgM Non-Reactive     Hepatitis C Ab Reactive    Narrative:      Results may be falsely decreased if patient taking Biotin.     Urinalysis, Microscopic Only - Urine, Clean Catch [[id number]]  (Abnormal) Collected: 08/08/24 2357    Specimen: Urine, Clean Catch Updated: 08/09/24 0027     RBC, UA 0-2 /HPF      WBC, UA 0-2 /HPF      Bacteria, UA Trace /HPF      Squamous Epithelial Cells, UA 31-50 /HPF      Hyaline Casts, UA 21-30 /LPF      Methodology Manual Light Microscopy    CBC & Differential [[id number]]  (Abnormal) Collected: 08/08/24 2329    Specimen: Blood Updated: 08/09/24 0023    Narrative:      The following orders were created for panel order CBC & Differential.  Procedure                               Abnormality         Status                     ---------                               -----------         ------                     CBC Auto Differential[[id number]]        Abnormal            Final result               Scan Slide[[id number]]                                       Final result                 Please view results for these tests on the individual orders.    CBC Auto Differential [[id number]]  (Abnormal) Collected: 08/08/24 2329    Specimen: Blood Updated: 08/09/24 0023     WBC 4.43 10*3/mm3      RBC 2.54 10*6/mm3      Hemoglobin 9.2 g/dL      Hematocrit 28.3 %      .4 fL      MCH 36.2 pg      MCHC 32.5 g/dL      RDW 15.3 %      RDW-SD 62.7 fl      MPV 11.8 fL      Platelets 74 10*3/mm3      Comment: Result checked         Scan Slide [[id number]] Collected: 08/08/24 2329    Specimen: Blood Updated:  08/09/24 0023     Scan Slide --     Comment: See Manual Differential Results       Manual Differential [[id number]]  (Abnormal) Collected: 08/08/24 2329    Specimen: Blood Updated: 08/09/24 0023     Neutrophil % 34.0 %      Lymphocyte % 47.0 %      Monocyte % 8.0 %      Eosinophil % 5.0 %      Basophil % 3.0 %      Atypical Lymphocyte % 3.0 %      Neutrophils Absolute 1.51 10*3/mm3      Lymphocytes Absolute 2.22 10*3/mm3      Monocytes Absolute 0.35 10*3/mm3      Eosinophils Absolute 0.22 10*3/mm3      Basophils Absolute 0.13 10*3/mm3      Anisocytosis Slight/1+     Elliptocytes Slight/1+     Macrocytes Slight/1+     WBC Morphology Normal     Platelet Estimate Decreased    Urinalysis With Microscopic If Indicated (No Culture) - Urine, Clean Catch [[id number]]  (Abnormal) Collected: 08/08/24 2357    Specimen: Urine, Clean Catch Updated: 08/09/24 0012     Color, UA Dark Yellow     Appearance, UA Cloudy     pH, UA <=5.0     Specific Gravity, UA 1.030     Glucose, UA Negative     Ketones, UA Trace     Bilirubin, UA Large (3+)     Comment: Confirmation testing is unavailable.  A serum bilirubin is recommended for further assessment.        Blood, UA Negative     Protein, UA Trace     Leuk Esterase, UA Small (1+)     Nitrite, UA Positive     Urobilinogen, UA 1.0 E.U./dL    Pregnancy, Urine - Urine, Clean Catch [[id number]]  (Normal) Collected: 08/08/24 2357    Specimen: Urine, Clean Catch Updated: 08/09/24 0012     HCG, Urine QL Negative    Comprehensive Metabolic Panel [[id number]]  (Abnormal) Collected: 08/08/24 2329    Specimen: Blood Updated: 08/09/24 0008     Glucose 97 mg/dL      BUN 9 mg/dL      Creatinine 0.86 mg/dL      Sodium 137 mmol/L      Potassium 4.2 mmol/L      Comment: Slight hemolysis detected by analyzer. Result may be falsely elevated.        Chloride 106 mmol/L      CO2 21.4 mmol/L      Calcium 8.0 mg/dL      Total Protein 6.7 g/dL      Albumin 2.8 g/dL      ALT (SGPT) 61 U/L      AST (SGOT) 121 U/L       Comment: Slight hemolysis detected by analyzer. Result may be falsely elevated.        Alkaline Phosphatase 166 U/L      Total Bilirubin 3.9 mg/dL      Globulin 3.9 gm/dL      A/G Ratio 0.7 g/dL      BUN/Creatinine Ratio 10.5     Anion Gap 9.6 mmol/L      eGFR 85.0 mL/min/1.73     Narrative:      GFR Normal >60  Chronic Kidney Disease <60  Kidney Failure <15      Lipase [[id number]]  (Normal) Collected: 08/08/24 2329    Specimen: Blood Updated: 08/09/24 0007     Lipase 57 U/L     BNP [[id number]]  (Normal) Collected: 08/08/24 2329    Specimen: Blood Updated: 08/09/24 0007     proBNP 154.0 pg/mL     Narrative:      This assay is used as an aid in the diagnosis of individuals suspected of having heart failure. It can be used as an aid in the diagnosis of acute decompensated heart failure (ADHF) in patients presenting with signs and symptoms of ADHF to the emergency department (ED). In addition, NT-proBNP of <300 pg/mL indicates ADHF is not likely.    Age Range Result Interpretation  NT-proBNP Concentration (pg/mL:      <50             Positive            >450                   Gray                 300-450                    Negative             <300    50-75           Positive            >900                  Gray                300-900                  Negative            <300      >75             Positive            >1800                  Gray                300-1800                  Negative            <300    [place] Draw [[id number]] Collected: 08/08/24 2329    Specimen: Blood Updated: 08/08/24 2345    Narrative:      The following orders were created for panel order [place] Draw.  Procedure                               Abnormality         Status                     ---------                               -----------         ------                     Green Top (Gel)[[id number]]                                  Final result               Lavender Top[[id number]]                                     Final result                Gold Top - SST[[id number]]                                   Final result               Light Blue Top[[id number]]                                   Final result                 Please view results for these tests on the individual orders.    Gold Top - SST [[id number]] Collected: 08/08/24 2329    Specimen: Blood Updated: 08/08/24 2345     Extra Tube Hold for add-ons.     Comment: Auto resulted.       Green Top (Gel) [[id number]] Collected: 08/08/24 2329    Specimen: Blood Updated: 08/08/24 2345     Extra Tube Hold for add-ons.     Comment: Auto resulted.       Lavender Top [[id number]] Collected: 08/08/24 2329    Specimen: Blood Updated: 08/08/24 2345     Extra Tube hold for add-on     Comment: Auto resulted       Light Blue Top [[id number]] Collected: 08/08/24 2329    Specimen: Blood Updated: 08/08/24 2345     Extra Tube Hold for add-ons.     Comment: Auto resulted               Imaging Results (Last 24 Hours)       Procedure Component Value Units Date/Time    CT Abdomen Pelvis With Contrast [[id number]] Collected: 08/09/24 0125     Updated: 08/09/24 0130    Narrative:      CT ABDOMEN PELVIS W CONTRAST    Date of Exam: 8/9/2024 1:00 AM EDT    Indication: Abdominal pain and swelling.    Comparison: None available.    Technique: Axial CT images were obtained of the abdomen and pelvis following the uneventful intravenous administration of iodinated contrast. Sagittal and coronal reconstructions were performed.  Automated exposure control and iterative reconstruction   methods were used.        Findings:  Lung Bases:     The visualized lung bases and lower mediastinal structures are unremarkable. Atelectatic changes are present within the left lung base.    Liver:  Liver is normal in size and CT density. No focal lesions. Small to moderate amount of ascites present throughout the abdomen and pelvis. Surface nodularity of the liver is present. Portal vein and its branches appear patent.    Biliary/Gallbladder:    The  gallbladder is normal in caliber. Diffuse gallbladder wall thickening is present. No evidence of stones.. The biliary tree is nondilated.    Spleen:  Spleen is normal in size and CT density.    Pancreas:    Pancreas is normal. There is no evidence of pancreatic mass or peripancreatic fluid.    Kidneys:    Kidneys are normal in size. There are no stones or hydronephrosis.    Adrenals:    Adrenal glands are unremarkable.    Retroperitoneal/Lymph Nodes/Vasculature:    No retroperitoneal adenopathy is identified.    Gastrointestinal/Mesentery:    The bowel loops are non-dilated without wall thickening or mass. The appendix appears within normal limits. No evidence of obstruction. No free air. No mesenteric fluid collections identified. Moderate stool burden present predominately within the right   hemicolon. There is anasarca of the soft tissues.    Bladder:    The bladder is normal.    Genital:     Unremarkable          Bony Structures:     Visualized bony structures are consistent with the patient's age.        Impression:      Impression:  1.Small to moderate amount of ascites present throughout the abdomen and pelvis with anasarca of the soft tissues likely related to volume overload or hepatic failure.  2.Surface nodularity of the liver consistent with cirrhosis. Portal vein is patent. No focal lesions.  3.Diffuse gallbladder wall thickening likely related to underlying liver disease. Acute cholecystitis cannot be excluded though less likely. No evidence of stones or biliary ductal dilatation.  4.Ancillary findings as described above.        Electronically Signed: Martine Wright MD    8/9/2024 1:28 AM EDT    Workstation ID: [id number]               ASSESSMENT   Etoh hepatitis/cirrhosis complicated by HE, esophageal varices, & ascites   Elevated LFT's  Hepatic encephalopathy    Pancytopenia with bandemia   Hep C Ab positive   ETOH abuse       PLAN:  Patient is a 43-year-old female who was at [facility] for alcohol  abuse patient was sent in for abdominal pain, abdominal swelling and edematous legs.  Patient was evaluated in the ER found to have elevated LFTs, moderate ascites on CT and cirrhosis.    MELD Na-17. Madey discriminant function is 23.2 no need for steroids.   Will order paracentesis today.  Complete alcohol cessation.  [facility] protocol.  Rocephin for SBP prophylaxis.  Continue folic acid, Lasix, lactulose, midodrine, Xifaxan, thiamine.  Will add zinc.  Will check AFP & right upper quadrant ultrasound.       I discussed the patients findings and my recommendations with the patient.  Farrah Dougherty, APRN  08/09/24  09:48 EDT    Time:

## 2024-08-09 NOTE — H&P
[facility] Medicine Services  History & Physical    Patient Name: Maria Elena Byrnes  : 1980  MRN: [id number]  Primary Care Physician:  Provider, No Known  Date of admission: 2024  Date and Time of Service: 2024 at 0240      Assessment & Plan      Chief Complaint: Abdominal pain    Plan:    Abdominal pain  Known cirrhosis of the liver due to alcohol abuse  -Patient reports that she drinks 1 pint of gin daily  -Alkaline phosphatase 166, , ALT 61, total bilirubin 3.9  -Hemoglobin 9.2, hematocrit 28.3, platelets 74  -CT of the abdomen and pelvis was reviewed and showed ascites throughout the abdomen with anasarca of the soft tissues related to hepatic failure.  Cirrhotic changes of the liver and diffuse gallbladder wall thickening likely related to underlying liver disease are also noted.  -Ammonia level ordered  -Hepatitis panel ordered  -Start patient on rifaximin and lactulose  -Midodrine ordered  -WA protocol ordered  -GI consultation  -Consider therapeutic paracentesis  -Case management consultation; patient will need to return to [facility] at discharge    Abnormal UA  -UA collected in ED was contaminated with 31-50 squamous epithelial cells.  Patient is not complaining of any urinary symptoms.  Abdominal pain is most likely related to cirrhosis rather than a UTI.  She was covered with 1 dose of Rocephin in the ED, will monitor off antibiotics for now.  Urine culture was not ordered as there were 0-2 WBCs in the urine sample.  -If patient develops urinary symptoms, would recommend sending another UA collected by straight catheterization    History of Present Illness     History of Present Illness: Maria Elena Byrnes is a 43 y.o. female with a previous medical history of alcohol abuse and cirrhosis who presented to [facility] on 2024 with complaints of abdominal pain and swelling in her abdomen and bilateral legs.  She currently resides at [facility] rehab facility for alcohol abuse.   She states that she usually drinks a minimum of 1 pint per day of Gin.  Today, she had 1/2 a pint of gin.  She does state that she has had a paracentesis in the past but does not know how much fluid was removed or any other details regarding her cirrhosis.      In the ED, alkaline phosphatase is 166, calcium 8.0, albumin 2.8, , ALT 61, total bilirubin 3.9, lipase 57, hemoglobin 9.2, hematocrit 28.3.  UA was drawn but was contaminated with 31-50 squamous epithelial cells.  Patient denies urinary symptoms including dysuria, frequency, urgency.  CT of the abdomen pelvis with contrast showed small to moderate amount of ascites throughout the abdomen with anasarca of the soft tissues likely related to hepatic failure.  Cirrhotic changes of the liver and diffuse gallbladder wall thickening likely related to underlying liver disease.  Portal vein is patent.  She is afebrile.  Blood pressure is low ranging 90/58 to 97/59.  Otherwise, vital signs are stable.  Hospitalist was consulted for further management.    On chart review, recent labs were completed on 8/1/24 which showed a hemoglobin of 8.4, hematocrit 25.8, platelets 93. Total Bilirubin was 4, alkaline phosphatase 171, ALT 70, .  She was positive for Hepatitis C in July, 2024.      12 point ROS reviewed and negative except as mentioned above    Objective      Vitals:   Temp:  [97.7 °F (36.5 °C)] 97.7 °F (36.5 °C)  Heart Rate:  [63-79] 63  Resp:  [18] 18  BP: (90-97)/(57-59) 90/58  Body mass index is 32.25 kg/m².    Physical Exam  Vitals and nursing note reviewed.   Constitutional:       Appearance: Normal appearance. She is obese. She is ill-appearing.   HENT:      Mouth/Throat:      Mouth: Mucous membranes are moist.   Cardiovascular:      Rate and Rhythm: Normal rate and regular rhythm.   Pulmonary:      Effort: Pulmonary effort is normal.      Breath sounds: Normal breath sounds.   Abdominal:      General: Abdomen is protuberant. Bowel sounds are  normal. There is distension.      Palpations: Abdomen is soft.      Tenderness: There is generalized abdominal tenderness.   Musculoskeletal:         General: Normal range of motion.   Skin:     General: Skin is warm and dry.   Neurological:      General: No focal deficit present.      Mental Status: She is alert and oriented to person, place, and time. Mental status is at baseline.   Psychiatric:         Mood and Affect: Mood normal.         Behavior: Behavior normal.        Personal History     This is a 43 y.o. female with:    History reviewed. No pertinent past medical history.    History reviewed. No pertinent surgical history.    Active and Resolved Problems  Active Hospital Problems    Diagnosis  POA    **Cirrhosis of liver with ascites [K74.60, R18.8]  Yes      Resolved Hospital Problems   No resolved problems to display.       Family History: family history is not on file. Otherwise pertinent FHx was reviewed and not pertinent to current issue.    Social History:      Home Medications:  Prior to Admission Medications       Prescriptions Last Dose Informant Patient Reported? Taking?    lactulose (CHRONULAC) 10 GM/15ML solution solution (encephalopathy)   Yes Yes    mirtazapine (REMERON) 30 MG tablet   Yes Yes    1 tablet.    nicotine polacrilex (NICORETTE) 2 MG gum   Yes Yes    Take 1 each by mouth Every 2 (Two) Hours As Needed.              Allergies:  No Known Allergies        VTE Prophylaxis:  Mechanical VTE prophylaxis orders are present.        CODE STATUS:    Code Status (Patient has no pulse and is not breathing): CPR (Attempt to Resuscitate)  Medical Interventions (Patient has pulse or is breathing): Full Support        Admission Status:  I believe this patient meets observation status.    I discussed the patient's findings and my recommendations with patient.    Signature:     This document has been electronically signed by Jenni Fields, KARTIK, APRN, AGACNP-BC on August 9, 2024 02:39 EDT   [facility]  [facility] Hospitalist Team

## 2024-08-10 ENCOUNTER — INPATIENT HOSPITAL (OUTPATIENT)
Dept: URBAN - METROPOLITAN AREA HOSPITAL 84 | Facility: HOSPITAL | Age: 44
End: 2024-08-10
Payer: MEDICAID

## 2024-08-10 ENCOUNTER — APPOINTMENT (OUTPATIENT)
Dept: CARDIOLOGY | Facility: HOSPITAL | Age: 44
End: 2024-08-10
Payer: MEDICAID

## 2024-08-10 DIAGNOSIS — K76.82 HEPATIC ENCEPHALOPATHY: ICD-10-CM

## 2024-08-10 DIAGNOSIS — D72.825 BANDEMIA: ICD-10-CM

## 2024-08-10 DIAGNOSIS — I85.00 ESOPHAGEAL VARICES WITHOUT BLEEDING: ICD-10-CM

## 2024-08-10 DIAGNOSIS — D61.818 OTHER PANCYTOPENIA: ICD-10-CM

## 2024-08-10 DIAGNOSIS — F10.10 ALCOHOL ABUSE, UNCOMPLICATED: ICD-10-CM

## 2024-08-10 DIAGNOSIS — K70.11 ALCOHOLIC HEPATITIS WITH ASCITES: ICD-10-CM

## 2024-08-10 DIAGNOSIS — K70.31 ALCOHOLIC CIRRHOSIS OF LIVER WITH ASCITES: ICD-10-CM

## 2024-08-10 DIAGNOSIS — B19.20 UNSPECIFIED VIRAL HEPATITIS C WITHOUT HEPATIC COMA: ICD-10-CM

## 2024-08-10 LAB
ALBUMIN SERPL-MCNC: 2.4 G/DL (ref 3.5–5.2)
ALBUMIN/GLOB SERPL: 0.6 G/DL
ALP SERPL-CCNC: 150 U/L (ref 39–117)
ALT SERPL W P-5'-P-CCNC: 51 U/L (ref 1–33)
ANION GAP SERPL CALCULATED.3IONS-SCNC: 7 MMOL/L (ref 5–15)
AORTIC DIMENSIONLESS INDEX: 0.45 (DI)
AST SERPL-CCNC: 102 U/L (ref 1–32)
BASOPHILS # BLD MANUAL: 0.05 10*3/MM3 (ref 0–0.2)
BASOPHILS NFR BLD MANUAL: 1 % (ref 0–1.5)
BH CV ECHO MEAS - AO MAX PG: 30.5 MMHG
BH CV ECHO MEAS - AO MEAN PG: 17 MMHG
BH CV ECHO MEAS - AO V2 MAX: 276 CM/SEC
BH CV ECHO MEAS - AO V2 VTI: 56.9 CM
BH CV ECHO MEAS - AVA(I,D): 1.45 CM2
BH CV ECHO MEAS - EDV(CUBED): 46.7 ML
BH CV ECHO MEAS - EDV(MOD-SP2): 119 ML
BH CV ECHO MEAS - EDV(MOD-SP4): 96.1 ML
BH CV ECHO MEAS - EF(MOD-BP): 60.9 %
BH CV ECHO MEAS - EF(MOD-SP2): 59.8 %
BH CV ECHO MEAS - EF(MOD-SP4): 60.5 %
BH CV ECHO MEAS - EF_3D-VOL: 61 %
BH CV ECHO MEAS - ESV(CUBED): 13.8 ML
BH CV ECHO MEAS - ESV(MOD-SP2): 47.8 ML
BH CV ECHO MEAS - ESV(MOD-SP4): 38 ML
BH CV ECHO MEAS - FS: 33.3 %
BH CV ECHO MEAS - IVS/LVPW: 0.82 CM
BH CV ECHO MEAS - IVSD: 0.9 CM
BH CV ECHO MEAS - LA DIMENSION: 3 CM
BH CV ECHO MEAS - LAT PEAK E' VEL: 8.7 CM/SEC
BH CV ECHO MEAS - LV DIASTOLIC VOL/BSA (35-75): 45.9 CM2
BH CV ECHO MEAS - LV MASS(C)D: 107.9 GRAMS
BH CV ECHO MEAS - LV MAX PG: 6.1 MMHG
BH CV ECHO MEAS - LV MEAN PG: 4 MMHG
BH CV ECHO MEAS - LV SYSTOLIC VOL/BSA (12-30): 18.2 CM2
BH CV ECHO MEAS - LV V1 MAX: 123 CM/SEC
BH CV ECHO MEAS - LV V1 VTI: 26.2 CM
BH CV ECHO MEAS - LVIDD: 3.6 CM
BH CV ECHO MEAS - LVIDS: 2.4 CM
BH CV ECHO MEAS - LVOT AREA: 3.1 CM2
BH CV ECHO MEAS - LVOT DIAM: 2 CM
BH CV ECHO MEAS - LVPWD: 1.1 CM
BH CV ECHO MEAS - MED PEAK E' VEL: 8.3 CM/SEC
BH CV ECHO MEAS - MV A MAX VEL: 55.5 CM/SEC
BH CV ECHO MEAS - MV DEC SLOPE: 348 CM/SEC2
BH CV ECHO MEAS - MV DEC TIME: 0.21 SEC
BH CV ECHO MEAS - MV E MAX VEL: 76 CM/SEC
BH CV ECHO MEAS - MV E/A: 1.37
BH CV ECHO MEAS - MV MAX PG: 2.14 MMHG
BH CV ECHO MEAS - MV MEAN PG: 1 MMHG
BH CV ECHO MEAS - MV P1/2T: 58.8 MSEC
BH CV ECHO MEAS - MV V2 VTI: 19.3 CM
BH CV ECHO MEAS - MVA(P1/2T): 3.7 CM2
BH CV ECHO MEAS - MVA(VTI): 4.3 CM2
BH CV ECHO MEAS - PA ACC TIME: 0.17 SEC
BH CV ECHO MEAS - PA V2 MAX: 96.3 CM/SEC
BH CV ECHO MEAS - PULM A REVS DUR: 0.13 SEC
BH CV ECHO MEAS - PULM A REVS VEL: 29.7 CM/SEC
BH CV ECHO MEAS - PULM DIAS VEL: 29.3 CM/SEC
BH CV ECHO MEAS - PULM S/D: 1.71
BH CV ECHO MEAS - PULM SYS VEL: 50.1 CM/SEC
BH CV ECHO MEAS - RV MAX PG: 3 MMHG
BH CV ECHO MEAS - RV V1 MAX: 87 CM/SEC
BH CV ECHO MEAS - RV V1 VTI: 17.8 CM
BH CV ECHO MEAS - SV(LVOT): 82.3 ML
BH CV ECHO MEAS - SV(MOD-SP2): 71.2 ML
BH CV ECHO MEAS - SV(MOD-SP4): 58.1 ML
BH CV ECHO MEAS - SVI(LVOT): 39.3 ML/M2
BH CV ECHO MEAS - SVI(MOD-SP2): 34 ML/M2
BH CV ECHO MEAS - SVI(MOD-SP4): 27.8 ML/M2
BH CV ECHO MEAS - TAPSE (>1.6): 2.9 CM
BH CV ECHO MEAS - TR MAX PG: 18.1 MMHG
BH CV ECHO MEAS - TR MAX VEL: 213 CM/SEC
BH CV ECHO MEASUREMENTS AVERAGE E/E' RATIO: 8.94
BH CV XLRA - RV BASE: 2.7 CM
BH CV XLRA - RV MID: 2.1 CM
BH CV XLRA - TDI S': 13.4 CM/SEC
BILIRUB SERPL-MCNC: 3.1 MG/DL (ref 0–1.2)
BUN SERPL-MCNC: 12 MG/DL (ref 6–20)
BUN/CREAT SERPL: 16.9 (ref 7–25)
CALCIUM SPEC-SCNC: 8.3 MG/DL (ref 8.6–10.5)
CHLORIDE SERPL-SCNC: 107 MMOL/L (ref 98–107)
CO2 SERPL-SCNC: 23 MMOL/L (ref 22–29)
CREAT SERPL-MCNC: 0.71 MG/DL (ref 0.57–1)
DEPRECATED RDW RBC AUTO: 60.1 FL (ref 37–54)
EGFRCR SERPLBLD CKD-EPI 2021: 107 ML/MIN/1.73
EOSINOPHIL # BLD MANUAL: 0.21 10*3/MM3 (ref 0–0.4)
EOSINOPHIL NFR BLD MANUAL: 4 % (ref 0.3–6.2)
ERYTHROCYTE [DISTWIDTH] IN BLOOD BY AUTOMATED COUNT: 14.7 % (ref 12.3–15.4)
GLOBULIN UR ELPH-MCNC: 3.8 GM/DL
GLUCOSE SERPL-MCNC: 103 MG/DL (ref 65–99)
HCT VFR BLD AUTO: 27.9 % (ref 34–46.6)
HGB BLD-MCNC: 9 G/DL (ref 12–15.9)
LEFT ATRIUM VOLUME INDEX: 33.1 ML/M2
LYMPHOCYTES # BLD MANUAL: 1.6 10*3/MM3 (ref 0.7–3.1)
LYMPHOCYTES NFR BLD MANUAL: 3 % (ref 5–12)
MACROCYTES BLD QL SMEAR: ABNORMAL
MCH RBC QN AUTO: 35.2 PG (ref 26.6–33)
MCHC RBC AUTO-ENTMCNC: 32.3 G/DL (ref 31.5–35.7)
MCV RBC AUTO: 109 FL (ref 79–97)
MONOCYTES # BLD: 0.16 10*3/MM3 (ref 0.1–0.9)
NEUTROPHILS # BLD AUTO: 3.15 10*3/MM3 (ref 1.7–7)
NEUTROPHILS NFR BLD MANUAL: 56 % (ref 42.7–76)
NEUTS BAND NFR BLD MANUAL: 5 % (ref 0–5)
PLATELET # BLD AUTO: 71 10*3/MM3 (ref 140–450)
PMV BLD AUTO: 11.2 FL (ref 6–12)
POTASSIUM SERPL-SCNC: 3.5 MMOL/L (ref 3.5–5.2)
POTASSIUM SERPL-SCNC: 4 MMOL/L (ref 3.5–5.2)
PROT SERPL-MCNC: 6.2 G/DL (ref 6–8.5)
QT INTERVAL: 474 MS
QTC INTERVAL: 466 MS
RBC # BLD AUTO: 2.56 10*6/MM3 (ref 3.77–5.28)
SCAN SLIDE: NORMAL
SINUS: 3.3 CM
SMALL PLATELETS BLD QL SMEAR: ABNORMAL
SODIUM SERPL-SCNC: 137 MMOL/L (ref 136–145)
VARIANT LYMPHS NFR BLD MANUAL: 31 % (ref 19.6–45.3)
WBC MORPH BLD: NORMAL
WBC NRBC COR # BLD AUTO: 5.17 10*3/MM3 (ref 3.4–10.8)

## 2024-08-10 PROCEDURE — 99233 SBSQ HOSP IP/OBS HIGH 50: CPT | Performed by: NURSE PRACTITIONER

## 2024-08-10 PROCEDURE — 93306 TTE W/DOPPLER COMPLETE: CPT

## 2024-08-10 PROCEDURE — 93306 TTE W/DOPPLER COMPLETE: CPT | Performed by: INTERNAL MEDICINE

## 2024-08-10 PROCEDURE — 25010000002 HYDROMORPHONE 1 MG/ML SOLUTION: Performed by: NURSE PRACTITIONER

## 2024-08-10 PROCEDURE — 85025 COMPLETE CBC W/AUTO DIFF WBC: CPT

## 2024-08-10 PROCEDURE — 25010000002 CEFTRIAXONE PER 250 MG: Performed by: NURSE PRACTITIONER

## 2024-08-10 PROCEDURE — 84132 ASSAY OF SERUM POTASSIUM: CPT | Performed by: INTERNAL MEDICINE

## 2024-08-10 PROCEDURE — 25010000002 THIAMINE PER 100 MG

## 2024-08-10 PROCEDURE — 99232 SBSQ HOSP IP/OBS MODERATE 35: CPT | Performed by: INTERNAL MEDICINE

## 2024-08-10 PROCEDURE — 85007 BL SMEAR W/DIFF WBC COUNT: CPT

## 2024-08-10 PROCEDURE — 80053 COMPREHEN METABOLIC PANEL: CPT | Performed by: NURSE PRACTITIONER

## 2024-08-10 PROCEDURE — 25010000002 FUROSEMIDE PER 20 MG: Performed by: INTERNAL MEDICINE

## 2024-08-10 RX ORDER — POTASSIUM CHLORIDE 20 MEQ/1
40 TABLET, EXTENDED RELEASE ORAL EVERY 4 HOURS
Status: COMPLETED | OUTPATIENT
Start: 2024-08-10 | End: 2024-08-10

## 2024-08-10 RX ORDER — SPIRONOLACTONE 25 MG/1
100 TABLET ORAL DAILY
Status: DISCONTINUED | OUTPATIENT
Start: 2024-08-10 | End: 2024-08-13 | Stop reason: HOSPADM

## 2024-08-10 RX ORDER — URSODIOL 300 MG/1
300 CAPSULE ORAL 2 TIMES DAILY
Status: DISCONTINUED | OUTPATIENT
Start: 2024-08-10 | End: 2024-08-13 | Stop reason: HOSPADM

## 2024-08-10 RX ADMIN — LORAZEPAM 1 MG: 1 TABLET ORAL at 13:05

## 2024-08-10 RX ADMIN — HYDROMORPHONE HYDROCHLORIDE 1 MG: 1 INJECTION, SOLUTION INTRAMUSCULAR; INTRAVENOUS; SUBCUTANEOUS at 05:40

## 2024-08-10 RX ADMIN — Medication 10 ML: at 22:21

## 2024-08-10 RX ADMIN — Medication 10 ML: at 08:41

## 2024-08-10 RX ADMIN — LACTULOSE 30 G: 10 SOLUTION ORAL at 08:44

## 2024-08-10 RX ADMIN — HYDROMORPHONE HYDROCHLORIDE 1 MG: 1 INJECTION, SOLUTION INTRAMUSCULAR; INTRAVENOUS; SUBCUTANEOUS at 22:15

## 2024-08-10 RX ADMIN — MIDODRINE HYDROCHLORIDE 10 MG: 5 TABLET ORAL at 18:49

## 2024-08-10 RX ADMIN — URSODIOL 300 MG: 300 CAPSULE ORAL at 22:15

## 2024-08-10 RX ADMIN — ZINC SULFATE 220 MG (50 MG) CAPSULE 220 MG: CAPSULE at 08:41

## 2024-08-10 RX ADMIN — THIAMINE HYDROCHLORIDE 200 MG: 100 INJECTION, SOLUTION INTRAMUSCULAR; INTRAVENOUS at 22:15

## 2024-08-10 RX ADMIN — RIFAXIMIN 550 MG: 550 TABLET ORAL at 22:15

## 2024-08-10 RX ADMIN — THIAMINE HYDROCHLORIDE 200 MG: 100 INJECTION, SOLUTION INTRAMUSCULAR; INTRAVENOUS at 05:40

## 2024-08-10 RX ADMIN — HYDROMORPHONE HYDROCHLORIDE 1 MG: 1 INJECTION, SOLUTION INTRAMUSCULAR; INTRAVENOUS; SUBCUTANEOUS at 08:53

## 2024-08-10 RX ADMIN — THIAMINE HYDROCHLORIDE 200 MG: 100 INJECTION, SOLUTION INTRAMUSCULAR; INTRAVENOUS at 14:56

## 2024-08-10 RX ADMIN — TRAMADOL HYDROCHLORIDE 50 MG: 50 TABLET ORAL at 01:07

## 2024-08-10 RX ADMIN — LORAZEPAM 1 MG: 1 TABLET ORAL at 23:40

## 2024-08-10 RX ADMIN — HYDROMORPHONE HYDROCHLORIDE 1 MG: 1 INJECTION, SOLUTION INTRAMUSCULAR; INTRAVENOUS; SUBCUTANEOUS at 17:10

## 2024-08-10 RX ADMIN — POTASSIUM CHLORIDE 40 MEQ: 1500 TABLET, EXTENDED RELEASE ORAL at 08:41

## 2024-08-10 RX ADMIN — MIDODRINE HYDROCHLORIDE 10 MG: 5 TABLET ORAL at 04:00

## 2024-08-10 RX ADMIN — LACTULOSE 30 G: 10 SOLUTION ORAL at 17:05

## 2024-08-10 RX ADMIN — LORAZEPAM 1 MG: 1 TABLET ORAL at 05:40

## 2024-08-10 RX ADMIN — FUROSEMIDE 40 MG: 10 INJECTION, SOLUTION INTRAMUSCULAR; INTRAVENOUS at 18:49

## 2024-08-10 RX ADMIN — LORAZEPAM 1 MG: 1 TABLET ORAL at 00:48

## 2024-08-10 RX ADMIN — FOLIC ACID 1 MG: 1 TABLET ORAL at 08:41

## 2024-08-10 RX ADMIN — LORAZEPAM 1 MG: 1 TABLET ORAL at 17:05

## 2024-08-10 RX ADMIN — FUROSEMIDE 40 MG: 10 INJECTION, SOLUTION INTRAMUSCULAR; INTRAVENOUS at 08:41

## 2024-08-10 RX ADMIN — CEFTRIAXONE 2000 MG: 2 INJECTION, POWDER, FOR SOLUTION INTRAMUSCULAR; INTRAVENOUS at 00:50

## 2024-08-10 RX ADMIN — POTASSIUM CHLORIDE 40 MEQ: 1500 TABLET, EXTENDED RELEASE ORAL at 13:05

## 2024-08-10 RX ADMIN — RIFAXIMIN 550 MG: 550 TABLET ORAL at 08:41

## 2024-08-10 RX ADMIN — MIDODRINE HYDROCHLORIDE 10 MG: 5 TABLET ORAL at 13:05

## 2024-08-10 RX ADMIN — SPIRONOLACTONE 100 MG: 25 TABLET ORAL at 14:56

## 2024-08-10 NOTE — PLAN OF CARE
Problem: Adult Inpatient Plan of Care  Goal: Plan of Care Review  Outcome: Ongoing, Progressing  Flowsheets (Taken 8/10/2024 0443)  Progress: improving  Plan of Care Reviewed With: patient  Goal: Patient-Specific Goal (Individualized)  Outcome: Ongoing, Progressing  Goal: Absence of Hospital-Acquired Illness or Injury  Outcome: Ongoing, Progressing  Intervention: Identify and Manage Fall Risk  Recent Flowsheet Documentation  Taken 8/10/2024 0200 by Bonny Green RN  Safety Promotion/Fall Prevention: safety round/check completed  Taken 8/10/2024 0000 by Bonny Green RN  Safety Promotion/Fall Prevention: safety round/check completed  Taken 8/9/2024 2200 by Bonny Green RN  Safety Promotion/Fall Prevention: safety round/check completed  Taken 8/9/2024 2020 by Bonny Green RN  Safety Promotion/Fall Prevention: safety round/check completed  Intervention: Prevent Skin Injury  Recent Flowsheet Documentation  Taken 8/10/2024 0200 by Bonny Green RN  Body Position: position changed independently  Taken 8/10/2024 0000 by Bonny Green RN  Body Position: position changed independently  Taken 8/9/2024 2200 by Bonny Green RN  Body Position: position changed independently  Taken 8/9/2024 2020 by Bonny Green RN  Body Position: position changed independently  Skin Protection:   adhesive use limited   tubing/devices free from skin contact  Intervention: Prevent and Manage VTE (Venous Thromboembolism) Risk  Recent Flowsheet Documentation  Taken 8/9/2024 2020 by Bonny Green RN  Activity Management: up to bedside commode  Intervention: Prevent Infection  Recent Flowsheet Documentation  Taken 8/10/2024 0200 by Bonny Green RN  Infection Prevention: environmental surveillance performed  Taken 8/10/2024 0000 by Bonny Green RN  Infection Prevention: environmental surveillance performed  Taken 8/9/2024 2200 by Bonny Green RN  Infection Prevention: environmental surveillance  performed  Taken 8/9/2024 2020 by Bonny Green RN  Infection Prevention: environmental surveillance performed  Goal: Optimal Comfort and Wellbeing  Outcome: Ongoing, Progressing  Intervention: Monitor Pain and Promote Comfort  Recent Flowsheet Documentation  Taken 8/9/2024 2020 by Bonny Green RN  Pain Management Interventions: see MAR  Intervention: Provide Person-Centered Care  Recent Flowsheet Documentation  Taken 8/9/2024 2020 by Bonny Green RN  Trust Relationship/Rapport:   care explained   choices provided   thoughts/feelings acknowledged   questions encouraged  Goal: Readiness for Transition of Care  Outcome: Ongoing, Progressing     Problem: Pain Acute  Goal: Acceptable Pain Control and Functional Ability  Outcome: Ongoing, Progressing  Intervention: Develop Pain Management Plan  Recent Flowsheet Documentation  Taken 8/9/2024 2020 by Bonny Green RN  Pain Management Interventions: see MAR  Intervention: Optimize Psychosocial Wellbeing  Recent Flowsheet Documentation  Taken 8/9/2024 2020 by Bonny Green RN  Supportive Measures:   verbalization of feelings encouraged   active listening utilized  Diversional Activities: television     Problem: UTI (Urinary Tract Infection)  Goal: Improved Infection Symptoms  Outcome: Ongoing, Progressing     Problem: Alcohol Withdrawal  Goal: Alcohol Withdrawal Symptom Control  Outcome: Ongoing, Progressing  Intervention: Minimize or Manage Alcohol Withdrawal Symptoms  Recent Flowsheet Documentation  Taken 8/9/2024 2020 by Bonny Green RN  Seizure Precautions:   side rails padded   soft boundaries provided     Problem: Acute Neurologic Deterioration (Alcohol Withdrawal)  Goal: Optimal Neurologic Function  Outcome: Ongoing, Progressing  Intervention: Prevent Seizure-Related Injury  Recent Flowsheet Documentation  Taken 8/9/2024 2020 by Bonny Green RN  Seizure Precautions:   side rails padded   soft boundaries provided     Problem: Substance  Misuse (Alcohol Withdrawal)  Goal: Readiness for Change Identified  Outcome: Ongoing, Progressing  Intervention: Partner to Facilitate Behavior Change  Recent Flowsheet Documentation  Taken 8/9/2024 2020 by Bonny Green RN  Supportive Measures:   verbalization of feelings encouraged   active listening utilized     Problem: Fall Injury Risk  Goal: Absence of Fall and Fall-Related Injury  Outcome: Ongoing, Progressing  Intervention: Promote Injury-Free Environment  Recent Flowsheet Documentation  Taken 8/10/2024 0200 by Bonny Green RN  Safety Promotion/Fall Prevention: safety round/check completed  Taken 8/10/2024 0000 by Bonny Green RN  Safety Promotion/Fall Prevention: safety round/check completed  Taken 8/9/2024 2200 by Bonny Green RN  Safety Promotion/Fall Prevention: safety round/check completed  Taken 8/9/2024 2020 by Bonny Green RN  Safety Promotion/Fall Prevention: safety round/check completed   Goal Outcome Evaluation:  Plan of Care Reviewed With: patient        Progress: improving

## 2024-08-10 NOTE — PROGRESS NOTES
[facility] MEDICINE SERVICE  DAILY PROGRESS NOTE    NAME: Maria Elena Byrnes  : 1980  MRN: [id number]      LOS: 0 days     PROVIDER OF SERVICE: Danilo Guerra MD    Chief Complaint: Alcoholic cirrhosis of liver with ascites    Subjective:     Interval History:  History taken from: patient chart  Patient Complaints: Abdominal pain, sore throat  Patient Denies: Nausea, vomiting    Review of Systems:   Review of Systems   Constitutional:  Positive for appetite change. Negative for activity change, chills and diaphoresis.   HENT: Negative.     Eyes: Negative.    Respiratory: Negative.     Cardiovascular: Negative.    Gastrointestinal:  Positive for abdominal distention and abdominal pain. Negative for anal bleeding, blood in stool, nausea, rectal pain and vomiting.   Endocrine: Negative.    Genitourinary: Negative.    Musculoskeletal: Negative.    Skin: Negative.    Allergic/Immunologic: Negative.    Neurological: Negative.    Hematological: Negative.    Psychiatric/Behavioral: Negative.     8/10  Patient with history of alcohol abuse and chronic hepatitis C  Status post paracentesis yesterday  Started on Lasix  Patient will be discharged on Lasix and Aldactone  Hemoglobin stable at 9  Patient sleepy and weak still with right lower quadrant abdominal pain  Patient seen per cardiology for rhythm control as she has gone to A-fib  Patient has been follow-up also per GI  Continue close monitoring today      Objective:     Vital Signs  Temp:  [97.6 °F (36.4 °C)-98.1 °F (36.7 °C)] 97.6 °F (36.4 °C)  Heart Rate:  [57-88] 84  Resp:  [9-15] 10  BP: ()/(53-81) 110/71   Body mass index is 33.99 kg/m².    Physical Exam  Physical Exam  Physical Exam  Vitals and nursing note reviewed.   Constitutional:       Appearance: Normal appearance. She is obese. She is ill-appearing.   HENT:      Mouth/Throat:      Mouth: Mucous membranes are moist.   Cardiovascular:      Rate and Rhythm: Normal rate and regular rhythm.    Pulmonary:      Effort: Pulmonary effort is normal.      Breath sounds: Normal breath sounds.   Abdominal:      General: Abdomen is protuberant. Bowel sounds are normal. There is distension.      Palpations: Abdomen is soft.      Tenderness: There is generalized abdominal tenderness.  Tenderness with movement  Musculoskeletal:         General: Normal range of motion.   Skin:     General: Skin is warm and dry.   Neurological:      General: No focal deficit present.      Mental Status: She is alert and oriented to person, place, and time. Mental status is at baseline.   Psychiatric:         Mood and Affect: Mood normal.         Behavior: Behavior normal.   Scheduled Meds   albumin human, 37.5 g, Intravenous, Once   Or  albumin human, 50 g, Intravenous, Once   Or  albumin human, 62.5 g, Intravenous, Once   Or  albumin human, 75 g, Intravenous, Once   Or  albumin human, 87.5 g, Intravenous, Once   Or  albumin human, 100 g, Intravenous, Once   Or  albumin human, 112.5 g, Intravenous, Once  cefTRIAXone, 2,000 mg, Intravenous, Daily  folic acid, 1 mg, Oral, Daily  furosemide, 40 mg, Intravenous, BID  lactulose, 30 g, Oral, TID  lidocaine, 10 mL, Subcutaneous, Once  LORazepam, 1 mg, Oral, Q6H   Followed by  LORazepam, 1 mg, Oral, Q12H   Followed by  [START ON 8/12/2024] LORazepam, 1 mg, Oral, Daily  midodrine, 10 mg, Oral, Q8H  potassium chloride, 40 mEq, Oral, Q4H  rifAXIMin, 550 mg, Oral, Q12H  sodium chloride, 10 mL, Intravenous, Q12H  thiamine (B-1) IV, 200 mg, Intravenous, Q8H   Followed by  [START ON 8/14/2024] thiamine, 100 mg, Oral, Daily  zinc sulfate, 220 mg, Oral, Daily       PRN Meds     senna-docusate sodium **AND** polyethylene glycol **AND** bisacodyl **AND** bisacodyl    Calcium Replacement - Follow Nurse / BPA Driven Protocol    HYDROmorphone    LORazepam **OR** LORazepam **OR** LORazepam **OR** LORazepam **OR** LORazepam **OR** LORazepam    Magnesium Standard Dose Replacement - Follow Nurse / BPA Driven  Protocol    melatonin    ondansetron    phenol    Phosphorus Replacement - Follow Nurse / BPA Driven Protocol    Potassium Replacement - Follow Nurse / BPA Driven Protocol    sodium chloride    sodium chloride    sodium chloride    traMADol   Infusions         Diagnostic Data    Results from last 7 days   Lab Units 08/10/24  0605   WBC 10*3/mm3 5.17   HEMOGLOBIN g/dL 9.0*   HEMATOCRIT % 27.9*   PLATELETS 10*3/mm3 71*   GLUCOSE mg/dL 103*   CREATININE mg/dL 0.71   BUN mg/dL 12   SODIUM mmol/L 137   POTASSIUM mmol/L 3.5   AST (SGOT) U/L 102*   ALT (SGPT) U/L 51*   ALK PHOS U/L 150*   BILIRUBIN mg/dL 3.1*   ANION GAP mmol/L 7.0       US Liver    Result Date: 8/9/2024  Impression: 1. Nodular liver contour compatible with underlying cirrhosis. No focal liver lesion by ultrasound. 2. Normal hepatopetal flow within the main portal vein. 3. Nonspecific gallbladder wall thickening, likely reactive to underlying liver disease rather than primary cholecystitis. Electronically Signed: Tirso Dawn  8/9/2024 4:14 PM EDT  Workstation ID: [id number]    CT Abdomen Pelvis With Contrast    Result Date: 8/9/2024  Impression: 1.Small to moderate amount of ascites present throughout the abdomen and pelvis with anasarca of the soft tissues likely related to volume overload or hepatic failure. 2.Surface nodularity of the liver consistent with cirrhosis. Portal vein is patent. No focal lesions. 3.Diffuse gallbladder wall thickening likely related to underlying liver disease. Acute cholecystitis cannot be excluded though less likely. No evidence of stones or biliary ductal dilatation. 4.Ancillary findings as described above. Electronically Signed: Martine Wright MD  8/9/2024 1:28 AM EDT  Workstation ID: [id number]       I reviewed the patient's new clinical results.    Assessment/Plan:     Active and Resolved Problems  Active Hospital Problems    Diagnosis  POA    **Alcoholic cirrhosis of liver with ascites [K70.31]  Yes    Arrhythmia [I49.9]   Yes    Anasarca [R60.1]  Yes    Thrombocytopenia [D69.6]  Yes    Macrocytic anemia [D53.9]  Yes    Hypotension [I95.9]  Yes    Esophageal varices [I85.00]  Yes    Major depressive disorder, recurrent episode, moderate degree [F33.1]  Yes    Chronic hepatitis C virus infection [B18.2]  Yes    Alcohol abuse [F10.10]  Yes    Tobacco abuse [Z72.0]  Yes      Resolved Hospital Problems   No resolved problems to display.       Abdominal pain  Known cirrhosis of the liver due to alcohol abuse  - Ammonia 125  - Alk phos, albumin, AST, ALT, total bili pending for 8/9/2024  - PT 15.9/INR 1.5  - WBC 3.21  - H&H 8.2/26.4 respectively  - Platelets 63  - Hep C antibody reactive  - CT of abdomen and pelvis showed ascites throughout the abdomen with anasarca of the soft tissues related to hepatic failure.  Cirrhotic changes of the liver and diffuse gallbladder wall thickening likely related to underlying liver disease.  - Dilaudid 1 mg every 3 hours as needed pain ordered  - Ultrasound of liver pending  - GI consulted    Alcohol abuse  - Patient currently resident at [facility] alcohol rehab [facility]  - Patient states she still continues to drink  - [facility] protocol initiated  - Case management consulted    I seen and evaluated this patient this morning.  Patient states that she has been having increased urination due to Lasix given while in the emergency department.  Patient's complaining of throat pain for 1 month since colonoscopy completed, patient also with complaints of abdominal pain.  Chloraseptic Spray, as well as Dilaudid every 3 as needed ordered.  Plan is for patient to go for therapeutic paracentesis today.  Will reevaluate patient, as well as labs in the morning.    VTE Prophylaxis:  Mechanical VTE prophylaxis orders are present.         Code status is   Code Status and Medical Interventions: CPR (Attempt to Resuscitate); Full Support   Ordered at: 08/09/24 0239     Code Status (Patient has no pulse and is not breathing):     CPR (Attempt to Resuscitate)     Medical Interventions (Patient has pulse or is breathing):    Full Support       Plan for disposition: [facility] rehab facility 8/11/2024    Time: 30 minutes    Signature: Electronically signed by Danilo Guerra MD, 08/10/24, 10:48 EDT.  [facility] Hospitalist Team      Addendum:    I saw and examined the patient in addition to the ELLIOTT.  I agree with assessment and plan with the following addendum:    General Appearance:  Alert, cooperative, no distress, appears stated age  Head:  Normocephalic, without obvious abnormality, atraumatic  Eyes:  PERRL, conjunctiva/corneas clear, EOM's intact, fundi benign, both eyes  Ears:  Normal TM's and external ear canals, both ears  Nose: Nares normal, septum midline, mucosa normal, no drainage or sinus tenderness  Throat: Lips, mucosa, and tongue normal; teeth and gums normal  Neck: Supple, symmetrical, trachea midline, no adenopathy, thyroid: not enlarged, symmetric, no tenderness/mass/nodules, no carotid bruit or JVD  Lungs:   Clear to auscultation bilaterally, respirations unlabored  Heart: Regular rate and rhythm, S1, S2 normal, no murmur, rub or gallop  Abdomen:  Soft, non-tender, bowel sounds active all four quadrants,  no masses, no organomegaly  Extremities: Extremities normal, atraumatic, no cyanosis or edema  Pulses: 2+ and symmetric  Skin: Skin color, texture, turgor normal, no rashes or lesions  Neurologic: Normal      Patient underwent paracentesis today with about 1 L of fluid removed.  This was likely the cause of her abdominal pain.  However she would likely have recurrence of this fluid.  I will start her on IV Lasix and transition her to oral Lasix on discharge.  She would also benefit from low-dose spironolactone on discharge.  Continue pain control.      MD Yinka Pillai Hospitalist Team  08/10/24  14:14 EDT

## 2024-08-10 NOTE — PROGRESS NOTES
Gastric changes person this is Dr. Corbin how can help program he he did want to [facility] referring Provider: Danilo Guerra MD    Reason for follow-up: ?  Atrial fibrillation     Patient Care Team:  Provider, No Known as PCP - General      SUBJECTIVE  Complains of generalized aches and pains and requesting for narcotics.     ROS  Review of all systems negative except as indicated.    Since I have last seen, the patient has been without any chest discomfort, shortness of breath, palpitations, dizziness or syncope.  Denies having any headache, abdominal pain, nausea, vomiting, diarrhea, constipation, loss of weight or loss of appetite.  Denies having any excessive bruising, hematuria or blood in the stool.        Personal History:    Past Medical History:   Diagnosis Date    Alcohol abuse 11/08/2022    Anemia of chronic disease 07/29/2024    Chronic GERD 08/09/2024    Chronic hepatitis C virus infection 11/08/2022    HAVP Negative 11/03/2022     HEPBSAG Negative 11/03/2022     HEPBCAB Negative 11/03/2022     HEPC10 Positive (A) 11/03/2022             Cirrhosis     Colitis 07/17/2024    Elevated LFTs 11/06/2019    Esophageal varices     Lesion of vocal fold 07/29/2024    Major depressive disorder, recurrent episode, moderate degree 02/17/2024    Neuropathy 08/09/2024    Pelvic congestion syndrome 07/28/2023    Portal vein thrombosis 07/11/2024    Sepsis without acute organ dysfunction 11/03/2022       History reviewed. No pertinent surgical history.    Family History   Family history unknown: Yes       Social History     Tobacco Use    Smoking status: Every Day     Types: Cigarettes    Smokeless tobacco: Current   Vaping Use    Vaping status: Every Day    Substances: Nicotine, THC, CBD, Flavoring    Devices: Disposable   Substance Use Topics    Alcohol use: Yes     Comment: Daily - 1-2 pints a day currently    Drug use: Yes     Types: Marijuana        Home meds:  Prior to Admission medications    Medication Sig Start  Date End Date Taking? Authorizing Provider   folic acid (FOLVITE) 1 MG tablet Take 1 tablet by mouth Daily.  Patient not taking: Reported on 8/9/2024    Varsha Costello MD   furosemide (LASIX) 40 MG tablet Take 1 tablet by mouth Daily.  Patient not taking: Reported on 8/9/2024    Varsha Costello MD   gabapentin (NEURONTIN) 300 MG capsule Take 1 capsule by mouth 3 (Three) Times a Day.  Patient not taking: Reported on 8/9/2024    Varsha Costello MD   HYDROcodone-acetaminophen (NORCO) 5-325 MG per tablet Take 1 tablet by mouth Every 6 (Six) Hours As Needed.  Patient not taking: Reported on 8/9/2024    Varsha Costello MD   hydrOXYzine (ATARAX) 25 MG tablet Take 1 tablet by mouth 3 (Three) Times a Day As Needed for Itching.  Patient not taking: Reported on 8/9/2024    Varsha Costello MD   lactulose (CHRONULAC) 10 GM/15ML solution solution (encephalopathy)  7/22/24   Varsha Costello MD   mirtazapine (REMERON) 30 MG tablet 1 tablet.  Patient not taking: Reported on 8/9/2024 7/30/24   Varsha Costello MD   nicotine polacrilex (NICORETTE) 2 MG gum Take 1 each by mouth Every 2 (Two) Hours As Needed.  Patient not taking: Reported on 8/9/2024 7/30/24 10/28/24  Varsha Costello MD   oxyCODONE (OXY-IR) 5 MG capsule Take 1 capsule by mouth Every 4 (Four) Hours As Needed for Moderate Pain.  Patient not taking: Reported on 8/9/2024    Varsha Costello MD   pantoprazole (PROTONIX) 40 MG EC tablet Take 1 tablet by mouth 2 (Two) Times a Day.  Patient not taking: Reported on 8/9/2024    Varsha Costello MD   propranolol (INDERAL) 10 MG tablet Take 1 tablet by mouth 3 (Three) Times a Day.  Patient not taking: Reported on 8/9/2024    Varsha Costello MD   spironolactone (ALDACTONE) 100 MG tablet Take 1 tablet by mouth Daily.  Patient not taking: Reported on 8/9/2024    Varsha Costello MD       Allergies:  Patient has no known allergies.    Scheduled Meds:albumin human,  "37.5 g, Intravenous, Once   Or  albumin human, 50 g, Intravenous, Once   Or  albumin human, 62.5 g, Intravenous, Once   Or  albumin human, 75 g, Intravenous, Once   Or  albumin human, 87.5 g, Intravenous, Once   Or  albumin human, 100 g, Intravenous, Once   Or  albumin human, 112.5 g, Intravenous, Once  cefTRIAXone, 2,000 mg, Intravenous, Daily  folic acid, 1 mg, Oral, Daily  furosemide, 40 mg, Intravenous, BID  lactulose, 30 g, Oral, TID  lidocaine, 10 mL, Subcutaneous, Once  LORazepam, 1 mg, Oral, Q6H   Followed by  LORazepam, 1 mg, Oral, Q12H   Followed by  [START ON 8/12/2024] LORazepam, 1 mg, Oral, Daily  midodrine, 10 mg, Oral, Q8H  rifAXIMin, 550 mg, Oral, Q12H  sodium chloride, 10 mL, Intravenous, Q12H  thiamine (B-1) IV, 200 mg, Intravenous, Q8H   Followed by  [START ON 8/14/2024] thiamine, 100 mg, Oral, Daily  zinc sulfate, 220 mg, Oral, Daily      Continuous Infusions:   PRN Meds:.  senna-docusate sodium **AND** polyethylene glycol **AND** bisacodyl **AND** bisacodyl    Calcium Replacement - Follow Nurse / BPA Driven Protocol    HYDROmorphone    LORazepam **OR** LORazepam **OR** LORazepam **OR** LORazepam **OR** LORazepam **OR** LORazepam    Magnesium Standard Dose Replacement - Follow Nurse / BPA Driven Protocol    melatonin    ondansetron    phenol    Phosphorus Replacement - Follow Nurse / BPA Driven Protocol    Potassium Replacement - Follow Nurse / BPA Driven Protocol    sodium chloride    sodium chloride    sodium chloride    traMADol      OBJECTIVE    Vital Signs  Vitals:    08/09/24 2030 08/10/24 0400 08/10/24 0432 08/10/24 0612   BP: 97/57 93/57 (!) 89/53 110/64   BP Location: Right arm  Left arm Left arm   Patient Position: Sitting  Lying Lying   Pulse: 88 86 83 78   Resp:   11    Temp:   98.1 °F (36.7 °C)    TempSrc:   Oral    SpO2: 97%  94% 96%   Weight:   98.8 kg (217 lb 13 oz)    Height:           Flowsheet Rows      Flowsheet Row First Filed Value   Admission Height 170.2 cm (67\") " Documented at 08/08/2024 2109   Admission Weight 93.4 kg (205 lb 14.6 oz) Documented at 08/08/2024 2109              Intake/Output Summary (Last 24 hours) at 8/10/2024 0632  Last data filed at 8/10/2024 0050  Gross per 24 hour   Intake 940 ml   Output 1200 ml   Net -260 ml          Telemetry: Sinus rhythm    Physical Exam:  The patient is alert, oriented and in no distress.  Obese  Vital signs as noted above.  Head and neck revealed no carotid bruits or jugular venous distention.  No thyromegaly or lymphadenopathy is present  Lungs clear.  No wheezing.  Breath sounds are normal bilaterally.  Heart normal first and second heart sounds.  No murmur. No precordial rub is present.  No gallop is present.  Abdomen soft and nontender.  No organomegaly is present.  Extremities with good peripheral pulses without any pedal edema.  Skin warm and dry.  Musculoskeletal system is grossly normal.  CNS grossly normal.       Results Review:  I have personally reviewed the results from the time of this admission to 8/10/2024 06:32 EDT and agree with these findings:  []  Laboratory  []  Microbiology  []  Radiology  []  EKG/Telemetry   []  Cardiology/Vascular   []  Pathology  []  Old records  []  Other:    Most notable findings include:    Lab Results (last 24 hours)       Procedure Component Value Units Date/Time    Body Fluid Culture - Body Fluid, Peritoneum [[id number]] Collected: 08/09/24 1225    Specimen: Body Fluid from Peritoneum Updated: 08/10/24 0625     Body Fluid Culture No growth at less than 24 hours     Gram Stain Rare (1+) WBCs seen      No organisms seen    CBC & Differential [[id number]] Collected: 08/10/24 0605    Specimen: Blood from Arm, Right Updated: 08/10/24 0625    Narrative:      The following orders were created for panel order CBC & Differential.  Procedure                               Abnormality         Status                     ---------                               -----------         ------                      CBC Auto Differential[[id number]]                            In process                 Scan Slide[[id number]]                                       In process                   Please view results for these tests on the individual orders.    Scan Slide [[id number]] Collected: 08/10/24 0605    Specimen: Blood from Arm, Right Updated: 08/10/24 0625    CBC Auto Differential [[id number]] Collected: 08/10/24 0605    Specimen: Blood from Arm, Right Updated: 08/10/24 0621    Comprehensive Metabolic Panel [[id number]] Collected: 08/10/24 0605    Specimen: Blood from Arm, Right Updated: 08/10/24 0621    Hepatitis C Genotype [[id number]] Collected: 08/09/24 2114    Specimen: Blood from Arm, Right Updated: 08/09/24 2134    Hepatitis C RNA, Quantitative, PCR (graph) [[id number]] Collected: 08/09/24 2114    Specimen: Blood from Arm, Right Updated: 08/09/24 2134    AFP Tumor Marker [[id number]]  (Normal) Collected: 08/08/24 2329    Specimen: Blood Updated: 08/09/24 1639     ALPHA-FETOPROTEIN 5.83 ng/mL     Narrative:      Alpha Fetoprotein Tumor Marker Reference Range:    0.0-8.3 ng/mL    Note: Normal values apply only to males and nonpregnant females. These results are not interpretable for pregnant females.    Testing Method: [employer] Electrochemiluminescence Immunoassay(ECLIA)  Values obtained with different assay methods or kits cannot be used interchangeably.    Lactate Dehydrogenase, Body Fluid - Peritoneal Fluid, Peritoneum [[id number]] Collected: 08/09/24 1225    Specimen: Peritoneal Fluid from Peritoneum Updated: 08/09/24 1636     Lactate Dehydrogenase (LD), Fluid 78 U/L     Narrative:      No Reference Ranges Established.    Serous fluid LDH greater than 60 percent of the serum LDH or serous fluid LDH two-thirds of the upper limit of normal for serum LDH suggests the fluid is an exudate.     1. Pleural TP/Serum TP >0.5  2. Pleural LD/Serum LD >0.6  3. Pleural LD >2/3 of the upper limit of normal for serum  LDH    This test was developed, it performance characteristics determined and judged suitable for clinical purposes by [facility].  It has not been cleared or approved by the FDA.  The laboratory is regulated under CLIA as qualified to perform high-complexity testing.     Magnesium [[id number]]  (Normal) Collected: 08/09/24 1355    Specimen: Blood Updated: 08/09/24 1612     Magnesium 1.6 mg/dL     TSH [[id number]]  (Normal) Collected: 08/09/24 1355    Specimen: Blood Updated: 08/09/24 1612     TSH 2.880 uIU/mL     Non-gynecologic Cytology [[id number]] Collected: 08/09/24 1225    Specimen: Peritoneal Fluid from Peritoneum Updated: 08/09/24 1434    Alkaline Phosphatase [[id number]]  (Abnormal) Collected: 08/09/24 1355    Specimen: Blood Updated: 08/09/24 1425     Alkaline Phosphatase 131 U/L     Albumin [[id number]]  (Abnormal) Collected: 08/09/24 1355    Specimen: Blood Updated: 08/09/24 1425     Albumin 2.6 g/dL     AST [[id number]]  (Abnormal) Collected: 08/09/24 1355    Specimen: Blood Updated: 08/09/24 1425     AST (SGOT) 103 U/L     ALT [[id number]]  (Abnormal) Collected: 08/09/24 1355    Specimen: Blood Updated: 08/09/24 1425     ALT (SGPT) 53 U/L     Bilirubin, Total [[id number]]  (Abnormal) Collected: 08/09/24 1355    Specimen: Blood Updated: 08/09/24 1425     Total Bilirubin 3.8 mg/dL     Body Fluid Cell Count With Differential - Body Fluid, Peritoneum [[id number]] Collected: 08/09/24 1225    Specimen: Body Fluid from Peritoneum Updated: 08/09/24 1402    Narrative:      The following orders were created for panel order Body Fluid Cell Count With Differential - Body Fluid, Peritoneum.  Procedure                               Abnormality         Status                     ---------                               -----------         ------                     Body fluid cell count - ...[[id number]]                      Final result               Body fluid differential ...[[id number]]                       Final result                 Please view results for these tests on the individual orders.    Body fluid differential - Body Fluid, Peritoneum [[id number]] Collected: 08/09/24 1225    Specimen: Body Fluid from Peritoneum Updated: 08/09/24 1402     Neutrophils, Fluid 2 %      Lymphocytes, Fluid 49 %      Monocytes, Fluid 37 %      Mesothelial Cells, Fluid 12 %     Narrative:      Concentrated Smear by Cytocentrifuge Prep.    Body fluid cell count - Body Fluid, Peritoneum [[id number]] Collected: 08/09/24 1225    Specimen: Body Fluid from Peritoneum Updated: 08/09/24 1328     Color, Fluid Dark Yellow     Appearance, Fluid Clear     Nucleated Cells, Fluid 119 /mm3      Method: Automated Sysmex XN Method    Narrative:      No reference range established. Physician to interpret results with clinical findings.    Protime-INR [[id number]]  (Abnormal) Collected: 08/09/24 1041    Specimen: Blood from Arm, Right Updated: 08/09/24 1056     Protime 15.9 Seconds      INR 1.50    CBC & Differential [[id number]]  (Abnormal) Collected: 08/09/24 0546    Specimen: Blood Updated: 08/09/24 0705    Narrative:      The following orders were created for panel order CBC & Differential.  Procedure                               Abnormality         Status                     ---------                               -----------         ------                     CBC Auto Differential[[id number]]        Abnormal            Final result               Scan Slide[[id number]]                                       Final result                 Please view results for these tests on the individual orders.    CBC Auto Differential [[id number]]  (Abnormal) Collected: 08/09/24 0546    Specimen: Blood Updated: 08/09/24 0705     WBC 3.21 10*3/mm3      RBC 2.35 10*6/mm3      Hemoglobin 8.2 g/dL      Hematocrit 26.4 %      .3 fL      MCH 34.9 pg      MCHC 31.1 g/dL      RDW 15.3 %      RDW-SD 63.2 fl      MPV 10.0 fL      Platelets 63 10*3/mm3      Narrative:      The previously reported component NRBC is no longer being reported. Previous result was 0.0 /100 WBC (Reference Range: 0.0-0.2 /100 WBC) on 8/9/2024 at 0600 EDT.    Scan Slide [[id number]] Collected: 08/09/24 0546    Specimen: Blood Updated: 08/09/24 0705     Scan Slide --     Comment: See Manual Differential Results       Manual Differential [[id number]]  (Abnormal) Collected: 08/09/24 0546    Specimen: Blood Updated: 08/09/24 0705     Neutrophil % 35.0 %      Lymphocyte % 49.0 %      Monocyte % 6.0 %      Eosinophil % 3.0 %      Basophil % 3.0 %      Bands %  2.0 %      Metamyelocyte % 1.0 %      Atypical Lymphocyte % 1.0 %      Neutrophils Absolute 1.19 10*3/mm3      Lymphocytes Absolute 1.61 10*3/mm3      Monocytes Absolute 0.19 10*3/mm3      Eosinophils Absolute 0.10 10*3/mm3      Basophils Absolute 0.10 10*3/mm3      Anisocytosis Slight/1+     Macrocytes Slight/1+     Poikilocytes Slight/1+     Target Cells Slight/1+     WBC Morphology Normal     Platelet Estimate Decreased            Imaging Results (Last 24 Hours)       Procedure Component Value Units Date/Time    US Liver [[id number]] Collected: 08/09/24 1611     Updated: 08/09/24 1616    Narrative:      US LIVER    Date of Exam: 8/9/2024 3:55 PM EDT    Indication: Cirrhosis.    Comparison: CT abdomen and pelvis dated 8/9/2024    Technique: Grayscale and color Doppler ultrasound evaluation of the right upper quadrant was performed.      Findings:  The liver is normal in size measuring 16.2 cm in craniocaudal dimension. There is coarsened hepatic echotexture and a nodular liver contour compatible with underlying cirrhosis. There is no focal liver lesion by ultrasound. There is normal hepatopetal   flow within the main portal vein. There is nonspecific gallbladder wall thickening. There is trace perihepatic ascites      Impression:      Impression:  1. Nodular liver contour compatible with underlying cirrhosis. No focal liver lesion by  ultrasound.  2. Normal hepatopetal flow within the main portal vein.  3. Nonspecific gallbladder wall thickening, likely reactive to underlying liver disease rather than primary cholecystitis.        Electronically Signed: Tirso Dawn    8/9/2024 4:14 PM EDT    Workstation ID: [id number]    Therapeutic Paracentesis With Radiology [[id number]] Resulted: 08/09/24 1129     Updated: 08/09/24 1129            LAB RESULTS (LAST 7 DAYS)    CBC  Results from last 7 days   Lab Units 08/09/24  0546 08/08/24  2329   WBC 10*3/mm3 3.21* 4.43   RBC 10*6/mm3 2.35* 2.54*   HEMOGLOBIN g/dL 8.2* 9.2*   HEMATOCRIT % 26.4* 28.3*   MCV fL 112.3* 111.4*   PLATELETS 10*3/mm3 63* 74*       BMP  Results from last 7 days   Lab Units 08/09/24  1355 08/09/24  0546 08/08/24  2329   SODIUM mmol/L  --  137 137   POTASSIUM mmol/L  --  3.9 4.2   CHLORIDE mmol/L  --  109* 106   CO2 mmol/L  --  20.7* 21.4*   BUN mg/dL  --  12 9   CREATININE mg/dL  --  0.90 0.86   GLUCOSE mg/dL  --  97 97   MAGNESIUM mg/dL 1.6  --   --        CMP   Results from last 7 days   Lab Units 08/09/24  1355 08/09/24  0546 08/08/24  2329   SODIUM mmol/L  --  137 137   POTASSIUM mmol/L  --  3.9 4.2   CHLORIDE mmol/L  --  109* 106   CO2 mmol/L  --  20.7* 21.4*   BUN mg/dL  --  12 9   CREATININE mg/dL  --  0.90 0.86   GLUCOSE mg/dL  --  97 97   ALBUMIN g/dL 2.6*  --  2.8*   BILIRUBIN mg/dL 3.8*  --  3.9*   ALK PHOS U/L 131*  --  166*   AST (SGOT) U/L 103*  --  121*   ALT (SGPT) U/L 53*  --  61*   LIPASE U/L  --   --  57   AMMONIA umol/L  --  125*  --        BNP        TROPONIN        CoAg  Results from last 7 days   Lab Units 08/09/24  1041   INR  1.50*       Creatinine Clearance  Estimated Creatinine Clearance: 95.3 mL/min (by C-G formula based on SCr of 0.9 mg/dL).    ABG        Radiology  US Liver    Result Date: 8/9/2024  Impression: 1. Nodular liver contour compatible with underlying cirrhosis. No focal liver lesion by ultrasound. 2. Normal hepatopetal flow within the main  portal vein. 3. Nonspecific gallbladder wall thickening, likely reactive to underlying liver disease rather than primary cholecystitis. Electronically Signed: Tirso Dawn  8/9/2024 4:14 PM EDT  Workstation ID: [id number]    CT Abdomen Pelvis With Contrast    Result Date: 8/9/2024  Impression: 1.Small to moderate amount of ascites present throughout the abdomen and pelvis with anasarca of the soft tissues likely related to volume overload or hepatic failure. 2.Surface nodularity of the liver consistent with cirrhosis. Portal vein is patent. No focal lesions. 3.Diffuse gallbladder wall thickening likely related to underlying liver disease. Acute cholecystitis cannot be excluded though less likely. No evidence of stones or biliary ductal dilatation. 4.Ancillary findings as described above. Electronically Signed: Martine Wright MD  8/9/2024 1:28 AM EDT  Workstation ID: [id number]       EKG  I personally viewed and interpreted the patient's EKG/Telemetry data:  ECG 12 Lead Rhythm Change   Preliminary Result   HEART RATE=63  bpm   RR Iarwghsa=1209  ms   AR Ndqefmrr=190  ms   P Horizontal Axis=28  deg   P Front Axis=31  deg   QRSD Kaufyrzj=704  ms   QT Zthijiwe=369  ms   MKnH=863  ms   QRS Axis=41  deg   T Wave Axis=43  deg   - OTHERWISE NORMAL ECG -   Sinus bradycardia   Ventricular premature complex   No previous ECG available for comparison   Date and Time of Study:2024-08-09 15:07:55      Telemetry Scan   Final Result            Echocardiogram:          Stress Test:         Cardiac Catheterization:  No results found for this or any previous visit.         Other:         ASSESSMENT & PLAN:    Principal Problem:    Alcoholic cirrhosis of liver with ascites  Active Problems:    Arrhythmia    Alcohol abuse    Chronic hepatitis C virus infection    Major depressive disorder, recurrent episode, moderate degree    Tobacco abuse    Esophageal varices    Anasarca    Thrombocytopenia    Macrocytic anemia     Hypotension    Alcoholic cirrhosis of liver with ascites and Esophageal varices / Alcohol abuse  Anasarca   History of large volume paracentesis in the past  GI has been consulted for diagnostic and therapeutic paracentesis  Liver ultrasound  Ceftriaxone for SBP  IV diuretics with Lasix  Add Aldactone  On lactulose and rifaximin for hyperammonemia   Started on Ativan for CIWA protocol  Added folic acid, thiamine and zinc     Hypotension  Likely secondary to liver disease and possible sepsis  Currently on midodrine with normal blood pressure  Albumin infusion as needed  Will closely monitor blood pressure     Arrhythmia  Questionable paroxysmal A-fib with slow ventricular rate  Currently in sinus rhythm on telemetry  We will obtain an echocardiogram: Pending  TSH is normal  Unable to tolerate beta-blocker due to bradycardia  Unable to tolerate anticoagulation due to esophageal varices, anemia, thrombocytopenia and high risk of bleeding.     Thrombocytopenia / Macrocytic anemia  Likely secondary to chronic liver disease  Platelets 71 and hemoglobin 9 today  INR is 1.5  Monitor CBC  Monitor for signs of bleeding     Chronic hepatitis C virus infection  Unsure if patient has received treatment in the past     Tobacco abuse  Smoking cessation counseling provided to the patient      Jayson Corbin MD  08/10/24  06:32 EDT

## 2024-08-10 NOTE — PLAN OF CARE
Problem: Adult Inpatient Plan of Care  Goal: Plan of Care Review  Outcome: Ongoing, Progressing  Flowsheets  Taken 8/10/2024 1135 by Yajaira Herrera, RN  Plan of Care Reviewed With: patient  Taken 8/10/2024 0443 by Bonny Green, RN  Progress: improving   Goal Outcome Evaluation:  Plan of Care Reviewed With: patient                                            Helical Rim Advancement Flap Text: The defect edges were debeveled with a #15 blade scalpel.  Given the location of the defect and the proximity to free margins (helical rim) a double helical rim advancement flap was deemed most appropriate.  Using a sterile surgical marker, the appropriate advancement flaps were drawn incorporating the defect and placing the expected incisions between the helical rim and antihelix where possible.  The area thus outlined was incised through and through with a #15 scalpel blade.  With a skin hook and iris scissors, the flaps were gently and sharply undermined and freed up.

## 2024-08-10 NOTE — PROGRESS NOTES
" LOS: 0 days   Patient Care Team:  Provider, No Known as PCP - General      Subjective   \"Okay\"    Interval History:   LABS: Sodium 137, potassium 3.5, creatinine 0.71.  TB 3.1 (3.8), alk phos 150 (131),  (103), ALT 51 (53).  WBCs 5.17, hemoglobin 9 (8.2) with an MCV of 109, platelets 71.  aFP 5.83.  MELD NA 17  Paracentesis 8/9/2024 no SBP.  1.2 L removed.  Liver ultrasound-Cirrhosis.  No focal liver lesions by ultrasound.  Has had several bowel movements today.    ROS:   No chest pain, shortness of breath, or cough.         Medication Review:     Current Facility-Administered Medications:     albumin human 25 % IV SOLN 37.5 g, 37.5 g, Intravenous, Once **OR** albumin human 25 % IV SOLN 50 g, 50 g, Intravenous, Once **OR** albumin human 25 % IV SOLN 62.5 g, 62.5 g, Intravenous, Once **OR** albumin human 25 % IV SOLN 75 g, 75 g, Intravenous, Once **OR** albumin human 25 % IV SOLN 87.5 g, 87.5 g, Intravenous, Once **OR** albumin human 25 % IV SOLN 100 g, 100 g, Intravenous, Once **OR** albumin human 25 % IV SOLN 112.5 g, 112.5 g, Intravenous, Once, Farrah Dougherty APRN    sennosides-docusate (PERICOLACE) 8.6-50 MG per tablet 2 tablet, 2 tablet, Oral, BID PRN **AND** polyethylene glycol (MIRALAX) packet 17 g, 17 g, Oral, Daily PRN **AND** bisacodyl (DULCOLAX) EC tablet 5 mg, 5 mg, Oral, Daily PRN **AND** bisacodyl (DULCOLAX) suppository 10 mg, 10 mg, Rectal, Daily PRN, Jenni Fields, APRN    Calcium Replacement - Follow Nurse / BPA Driven Protocol, , Does not apply, PRN, Kimmie Alejo, APRN    cefTRIAXone (ROCEPHIN) 2,000 mg in sodium chloride 0.9 % 100 mL MBP, 2,000 mg, Intravenous, Daily, Farrah Dougherty, APRN, Last Rate: 200 mL/hr at 08/10/24 0050, 2,000 mg at 08/10/24 0050    folic acid (FOLVITE) tablet 1 mg, 1 mg, Oral, Daily, Kimmie Alejo, APRN, 1 mg at 08/10/24 0841    furosemide (LASIX) injection 40 mg, 40 mg, Intravenous, BID, Shay Thornton MD, 40 mg at 08/10/24 0841    HYDROmorphone " (DILAUDID) injection 1 mg, 1 mg, Intravenous, Q3H PRN, Ophelia Hercules, APRN, 1 mg at 08/10/24 0853    lactulose (CHRONULAC) 10 GM/15ML solution 30 g, 30 g, Oral, TID, Jenni Fields APRN, 30 g at 08/10/24 0844    lidocaine (XYLOCAINE) 1 % injection 10 mL, 10 mL, Subcutaneous, Once, Shay Thornton MD    LORazepam (ATIVAN) tablet 1 mg, 1 mg, Oral, Q1H PRN, 1 mg at 24 0901 **OR** LORazepam (ATIVAN) injection 1 mg, 1 mg, Intravenous, Q1H PRN **OR** LORazepam (ATIVAN) tablet 2 mg, 2 mg, Oral, Q1H PRN **OR** LORazepam (ATIVAN) injection 2 mg, 2 mg, Intravenous, Q1H PRN **OR** LORazepam (ATIVAN) injection 2 mg, 2 mg, Intravenous, Q15 Min PRN **OR** LORazepam (ATIVAN) injection 2 mg, 2 mg, Intramuscular, Q15 Min PRN, Kimmie Alejo APRN    [] LORazepam (ATIVAN) tablet 2 mg, 2 mg, Oral, Q6H, 2 mg at 24 0549 **FOLLOWED BY** LORazepam (ATIVAN) tablet 1 mg, 1 mg, Oral, Q6H, 1 mg at 08/10/24 1305 **FOLLOWED BY** LORazepam (ATIVAN) tablet 1 mg, 1 mg, Oral, Q12H **FOLLOWED BY** [START ON 2024] LORazepam (ATIVAN) tablet 1 mg, 1 mg, Oral, Daily, Kimmie Alejo APRN    Magnesium Standard Dose Replacement - Follow Nurse / BPA Driven Protocol, , Does not apply, PRN, Kimmie Alejo APRN    melatonin tablet 5 mg, 5 mg, Oral, Nightly PRN, Jenni Fields APRN, 5 mg at 24    midodrine (PROAMATINE) tablet 10 mg, 10 mg, Oral, Q8H, Jenni Fields APRN, 10 mg at 08/10/24 1305    ondansetron (ZOFRAN) injection 4 mg, 4 mg, Intravenous, Q6H PRN, Jenni Fields APRN    phenol (CHLORASEPTIC) 1.4 % liquid 1 spray, 1 spray, Mouth/Throat, Q2H PRN, Ophelia Hercules APRN    Phosphorus Replacement - Follow Nurse / BPA Driven Protocol, , Does not apply, PRN, Kimmie Alejo APRN    Potassium Replacement - Follow Nurse / BPA Driven Protocol, , Does not apply, PRN, Kimmie Alejo APRN    riFAXIMin (XIFAXAN) tablet 550 mg, 550 mg, Oral, Q12H, Jenni Fields APRN, 550 mg at 08/10/24 0841     sodium chloride 0.9 % flush 10 mL, 10 mL, Intravenous, PRN, Kimmie Alejo, APRN    sodium chloride 0.9 % flush 10 mL, 10 mL, Intravenous, Q12H, Jenni Fields, APRN, 10 mL at 08/10/24 0841    sodium chloride 0.9 % flush 10 mL, 10 mL, Intravenous, PRN, Jenni Fields L, APRN    sodium chloride 0.9 % infusion 40 mL, 40 mL, Intravenous, PRN, Jenni Fields, APRN    thiamine (B-1) injection 200 mg, 200 mg, Intravenous, Q8H, 200 mg at 08/10/24 0540 **FOLLOWED BY** [START ON 8/14/2024] thiamine (VITAMIN B-1) tablet 100 mg, 100 mg, Oral, Daily, Kimmie Alejo APRIKER    traMADol (ULTRAM) tablet 50 mg, 50 mg, Oral, Q6H PRN, Shay Thornton MD, 50 mg at 08/10/24 0107    zinc sulfate (ZINCATE) capsule 220 mg, 220 mg, Oral, Daily, Farrah Dougherty APRN, 220 mg at 08/10/24 0841      Objective in hospital bed.  NAD.  No family present.  Still very drowsy and slow to respond.    Vital Signs  Temp:  [97.6 °F (36.4 °C)-98.1 °F (36.7 °C)] 97.6 °F (36.4 °C)  Heart Rate:  [57-88] 84  Resp:  [9-15] 10  BP: ()/(53-81) 110/71  Physical Exam:    General Appearance:    Awake and alert, in no acute distress   Head:    Normocephalic, without obvious abnormality   Eyes:          Conjunctivae normal, anicteric sclerae   Ears:    Hearing intact   Throat:   No oral lesions, no thrush, oral mucosa moist   Neck:   No adenopathy, supple, no JVD   Lungs:      respirations regular, even and unlabored        Abdomen:      soft, non-tender, no rebound or guarding, non-distended, no hepatosplenomegaly   Rectal:     Deferred   Extremities:   No edema, no cyanosis, no redness   Skin:   No bleeding, bruising or rash, no jaundice   Neurologic:   Cranial nerves 2 - 12 grossly intact, no asterixis, sensation   intact        Results Review:    CBC    Results from last 7 days   Lab Units 08/10/24  0605 08/09/24  0546 08/08/24  2329   WBC 10*3/mm3 5.17 3.21* 4.43   HEMOGLOBIN g/dL 9.0* 8.2* 9.2*   PLATELETS 10*3/mm3 71* 63* 74*     CMP   Results from  "last 7 days   Lab Units 08/10/24  0605 08/09/24  1355 08/09/24  0546 08/08/24  2329   SODIUM mmol/L 137  --  137 137   POTASSIUM mmol/L 3.5  --  3.9 4.2   CHLORIDE mmol/L 107  --  109* 106   CO2 mmol/L 23.0  --  20.7* 21.4*   BUN mg/dL 12  --  12 9   CREATININE mg/dL 0.71  --  0.90 0.86   GLUCOSE mg/dL 103*  --  97 97   ALBUMIN g/dL 2.4* 2.6*  --  2.8*   BILIRUBIN mg/dL 3.1* 3.8*  --  3.9*   ALK PHOS U/L 150* 131*  --  166*   AST (SGOT) U/L 102* 103*  --  121*   ALT (SGPT) U/L 51* 53*  --  61*   MAGNESIUM mg/dL  --  1.6  --   --    LIPASE U/L  --   --   --  57   AMMONIA umol/L  --   --  125*  --      Cr Clearance Estimated Creatinine Clearance: 120.5 mL/min (by C-G formula based on SCr of 0.71 mg/dL).  Coag   Results from last 7 days   Lab Units 08/09/24  1041   INR  1.50*     HbA1C No results found for: \"HGBA1C\"  Blood Glucose No results found for: \"POCGLU\"  Infection   Results from last 7 days   Lab Units 08/09/24  1225   BODYFLDCX  No growth at less than 24 hours     UA    Results from last 7 days   Lab Units 08/08/24  2357   NITRITE UA  Positive*   WBC UA /HPF 0-2   BACTERIA UA /HPF Trace*   SQUAM EPITHEL UA /HPF 31-50*     Radiology(recent) US Liver    Result Date: 8/9/2024  Impression: 1. Nodular liver contour compatible with underlying cirrhosis. No focal liver lesion by ultrasound. 2. Normal hepatopetal flow within the main portal vein. 3. Nonspecific gallbladder wall thickening, likely reactive to underlying liver disease rather than primary cholecystitis. Electronically Signed: Tirso Dawn  8/9/2024 4:14 PM EDT  Workstation ID: [id number]    CT Abdomen Pelvis With Contrast    Result Date: 8/9/2024  Impression: 1.Small to moderate amount of ascites present throughout the abdomen and pelvis with anasarca of the soft tissues likely related to volume overload or hepatic failure. 2.Surface nodularity of the liver consistent with cirrhosis. Portal vein is patent. No focal lesions. 3.Diffuse gallbladder wall " thickening likely related to underlying liver disease. Acute cholecystitis cannot be excluded though less likely. No evidence of stones or biliary ductal dilatation. 4.Ancillary findings as described above. Electronically Signed: Martine Wright MD  8/9/2024 1:28 AM EDT  Workstation ID: [id number]           Assessment & Plan   Etoh hepatitis/cirrhosis complicated by HE, esophageal varices, & ascites   Elevated LFT's  Hepatic encephalopathy    Pancytopenia with bandemia   Hep C Ab positive   ETOH abuse      8/9/24 Para 1.2L no signs of SBP      PLAN:  Patient is a 43-year-old female who was at [facility] for alcohol abuse patient was sent in for abdominal pain, abdominal swelling and edematous legs.  Patient was evaluated in the ER found to have elevated LFTs, moderate ascites on CT and cirrhosis.    MELD-Na 17   Continue Lasix.  Will add Aldactone.  Sodium potassium are normal and creatinine is 0.71.  WA protocol.  Continue alcohol cessation.  Continue ceftriaxone, folic acid, lactulose, midodrine, Xifaxan, thiamine and zinc.  AFP is normal and right upper quadrant ultrasound does not show any liver lesions.  Start ursodiol as per recommendations yesterday.  Patient states she just has a dull generalized abdominal pain.  Nausea and vomiting earlier in the day.  Continue to encourage good nutrition.      MARC Antunez  08/10/24  13:18 EDT

## 2024-08-11 ENCOUNTER — INPATIENT HOSPITAL (OUTPATIENT)
Dept: URBAN - METROPOLITAN AREA HOSPITAL 84 | Facility: HOSPITAL | Age: 44
End: 2024-08-11
Payer: MEDICAID

## 2024-08-11 DIAGNOSIS — F10.10 ALCOHOL ABUSE, UNCOMPLICATED: ICD-10-CM

## 2024-08-11 DIAGNOSIS — K76.82 HEPATIC ENCEPHALOPATHY: ICD-10-CM

## 2024-08-11 DIAGNOSIS — D61.818 OTHER PANCYTOPENIA: ICD-10-CM

## 2024-08-11 DIAGNOSIS — D72.825 BANDEMIA: ICD-10-CM

## 2024-08-11 DIAGNOSIS — K70.31 ALCOHOLIC CIRRHOSIS OF LIVER WITH ASCITES: ICD-10-CM

## 2024-08-11 DIAGNOSIS — B19.20 UNSPECIFIED VIRAL HEPATITIS C WITHOUT HEPATIC COMA: ICD-10-CM

## 2024-08-11 DIAGNOSIS — K70.11 ALCOHOLIC HEPATITIS WITH ASCITES: ICD-10-CM

## 2024-08-11 DIAGNOSIS — I85.00 ESOPHAGEAL VARICES WITHOUT BLEEDING: ICD-10-CM

## 2024-08-11 LAB
ALBUMIN SERPL-MCNC: 2.5 G/DL (ref 3.5–5.2)
ALBUMIN/GLOB SERPL: 0.6 G/DL
ALP SERPL-CCNC: 158 U/L (ref 39–117)
ALT SERPL W P-5'-P-CCNC: 54 U/L (ref 1–33)
ANION GAP SERPL CALCULATED.3IONS-SCNC: 9.5 MMOL/L (ref 5–15)
ANISOCYTOSIS BLD QL: ABNORMAL
AST SERPL-CCNC: 107 U/L (ref 1–32)
BILIRUB SERPL-MCNC: 3.8 MG/DL (ref 0–1.2)
BUN SERPL-MCNC: 10 MG/DL (ref 6–20)
BUN/CREAT SERPL: 13.9 (ref 7–25)
CALCIUM SPEC-SCNC: 8.8 MG/DL (ref 8.6–10.5)
CHLORIDE SERPL-SCNC: 103 MMOL/L (ref 98–107)
CO2 SERPL-SCNC: 23.5 MMOL/L (ref 22–29)
CREAT SERPL-MCNC: 0.72 MG/DL (ref 0.57–1)
DACRYOCYTES BLD QL SMEAR: ABNORMAL
DEPRECATED RDW RBC AUTO: 59.2 FL (ref 37–54)
EGFRCR SERPLBLD CKD-EPI 2021: 105.2 ML/MIN/1.73
ERYTHROCYTE [DISTWIDTH] IN BLOOD BY AUTOMATED COUNT: 14.6 % (ref 12.3–15.4)
GLOBULIN UR ELPH-MCNC: 4.2 GM/DL
GLUCOSE SERPL-MCNC: 86 MG/DL (ref 65–99)
HCT VFR BLD AUTO: 30.3 % (ref 34–46.6)
HGB BLD-MCNC: 9.7 G/DL (ref 12–15.9)
INR PPP: 1.49 (ref 0.93–1.1)
LYMPHOCYTES # BLD MANUAL: 1.35 10*3/MM3 (ref 0.7–3.1)
LYMPHOCYTES NFR BLD MANUAL: 3 % (ref 5–12)
MACROCYTES BLD QL SMEAR: ABNORMAL
MCH RBC QN AUTO: 34.9 PG (ref 26.6–33)
MCHC RBC AUTO-ENTMCNC: 32 G/DL (ref 31.5–35.7)
MCV RBC AUTO: 109 FL (ref 79–97)
MONOCYTES # BLD: 0.2 10*3/MM3 (ref 0.1–0.9)
NEUTROPHILS # BLD AUTO: 5.2 10*3/MM3 (ref 1.7–7)
NEUTROPHILS NFR BLD MANUAL: 77 % (ref 42.7–76)
PLATELET # BLD AUTO: 89 10*3/MM3 (ref 140–450)
PMV BLD AUTO: 11.1 FL (ref 6–12)
POIKILOCYTOSIS BLD QL SMEAR: ABNORMAL
POTASSIUM SERPL-SCNC: 3.9 MMOL/L (ref 3.5–5.2)
PROT SERPL-MCNC: 6.7 G/DL (ref 6–8.5)
PROTHROMBIN TIME: 15.8 SECONDS (ref 9.6–11.7)
RBC # BLD AUTO: 2.78 10*6/MM3 (ref 3.77–5.28)
SCAN SLIDE: NORMAL
SMALL PLATELETS BLD QL SMEAR: ABNORMAL
SODIUM SERPL-SCNC: 136 MMOL/L (ref 136–145)
VARIANT LYMPHS NFR BLD MANUAL: 20 % (ref 19.6–45.3)
WBC MORPH BLD: NORMAL
WBC NRBC COR # BLD AUTO: 6.75 10*3/MM3 (ref 3.4–10.8)

## 2024-08-11 PROCEDURE — 80053 COMPREHEN METABOLIC PANEL: CPT | Performed by: NURSE PRACTITIONER

## 2024-08-11 PROCEDURE — 25010000002 ONDANSETRON PER 1 MG

## 2024-08-11 PROCEDURE — 25010000002 CEFTRIAXONE PER 250 MG: Performed by: NURSE PRACTITIONER

## 2024-08-11 PROCEDURE — 85007 BL SMEAR W/DIFF WBC COUNT: CPT

## 2024-08-11 PROCEDURE — 25010000002 HYDROMORPHONE 1 MG/ML SOLUTION: Performed by: NURSE PRACTITIONER

## 2024-08-11 PROCEDURE — 85610 PROTHROMBIN TIME: CPT | Performed by: NURSE PRACTITIONER

## 2024-08-11 PROCEDURE — 99232 SBSQ HOSP IP/OBS MODERATE 35: CPT | Performed by: INTERNAL MEDICINE

## 2024-08-11 PROCEDURE — 25010000002 FUROSEMIDE PER 20 MG: Performed by: INTERNAL MEDICINE

## 2024-08-11 PROCEDURE — 85025 COMPLETE CBC W/AUTO DIFF WBC: CPT

## 2024-08-11 PROCEDURE — 25010000002 THIAMINE PER 100 MG

## 2024-08-11 PROCEDURE — 99232 SBSQ HOSP IP/OBS MODERATE 35: CPT | Performed by: NURSE PRACTITIONER

## 2024-08-11 RX ADMIN — RIFAXIMIN 550 MG: 550 TABLET ORAL at 21:26

## 2024-08-11 RX ADMIN — HYDROMORPHONE HYDROCHLORIDE 1 MG: 1 INJECTION, SOLUTION INTRAMUSCULAR; INTRAVENOUS; SUBCUTANEOUS at 18:32

## 2024-08-11 RX ADMIN — ZINC SULFATE 220 MG (50 MG) CAPSULE 220 MG: CAPSULE at 10:23

## 2024-08-11 RX ADMIN — LORAZEPAM 1 MG: 1 TABLET ORAL at 16:18

## 2024-08-11 RX ADMIN — MIDODRINE HYDROCHLORIDE 10 MG: 5 TABLET ORAL at 02:58

## 2024-08-11 RX ADMIN — LACTULOSE 30 G: 10 SOLUTION ORAL at 10:23

## 2024-08-11 RX ADMIN — THIAMINE HYDROCHLORIDE 200 MG: 100 INJECTION, SOLUTION INTRAMUSCULAR; INTRAVENOUS at 06:19

## 2024-08-11 RX ADMIN — HYDROMORPHONE HYDROCHLORIDE 1 MG: 1 INJECTION, SOLUTION INTRAMUSCULAR; INTRAVENOUS; SUBCUTANEOUS at 15:09

## 2024-08-11 RX ADMIN — Medication 10 ML: at 21:27

## 2024-08-11 RX ADMIN — LORAZEPAM 1 MG: 1 TABLET ORAL at 10:22

## 2024-08-11 RX ADMIN — RIFAXIMIN 550 MG: 550 TABLET ORAL at 10:22

## 2024-08-11 RX ADMIN — MIDODRINE HYDROCHLORIDE 10 MG: 5 TABLET ORAL at 11:44

## 2024-08-11 RX ADMIN — MIDODRINE HYDROCHLORIDE 10 MG: 5 TABLET ORAL at 18:32

## 2024-08-11 RX ADMIN — LACTULOSE 30 G: 10 SOLUTION ORAL at 16:18

## 2024-08-11 RX ADMIN — LORAZEPAM 1 MG: 1 TABLET ORAL at 23:52

## 2024-08-11 RX ADMIN — SPIRONOLACTONE 100 MG: 25 TABLET ORAL at 10:22

## 2024-08-11 RX ADMIN — HYDROMORPHONE HYDROCHLORIDE 1 MG: 1 INJECTION, SOLUTION INTRAMUSCULAR; INTRAVENOUS; SUBCUTANEOUS at 02:58

## 2024-08-11 RX ADMIN — CEFTRIAXONE 2000 MG: 2 INJECTION, POWDER, FOR SOLUTION INTRAMUSCULAR; INTRAVENOUS at 10:24

## 2024-08-11 RX ADMIN — LACTULOSE 30 G: 10 SOLUTION ORAL at 21:26

## 2024-08-11 RX ADMIN — ONDANSETRON 4 MG: 2 INJECTION INTRAMUSCULAR; INTRAVENOUS at 16:18

## 2024-08-11 RX ADMIN — Medication 5 MG: at 21:26

## 2024-08-11 RX ADMIN — URSODIOL 300 MG: 300 CAPSULE ORAL at 21:26

## 2024-08-11 RX ADMIN — THIAMINE HYDROCHLORIDE 200 MG: 100 INJECTION, SOLUTION INTRAMUSCULAR; INTRAVENOUS at 21:26

## 2024-08-11 RX ADMIN — FUROSEMIDE 40 MG: 10 INJECTION, SOLUTION INTRAMUSCULAR; INTRAVENOUS at 10:23

## 2024-08-11 RX ADMIN — HYDROMORPHONE HYDROCHLORIDE 1 MG: 1 INJECTION, SOLUTION INTRAMUSCULAR; INTRAVENOUS; SUBCUTANEOUS at 21:26

## 2024-08-11 RX ADMIN — FOLIC ACID 1 MG: 1 TABLET ORAL at 10:23

## 2024-08-11 RX ADMIN — THIAMINE HYDROCHLORIDE 200 MG: 100 INJECTION, SOLUTION INTRAMUSCULAR; INTRAVENOUS at 15:09

## 2024-08-11 RX ADMIN — ONDANSETRON 4 MG: 2 INJECTION INTRAMUSCULAR; INTRAVENOUS at 23:52

## 2024-08-11 RX ADMIN — URSODIOL 300 MG: 300 CAPSULE ORAL at 10:23

## 2024-08-11 RX ADMIN — FUROSEMIDE 40 MG: 10 INJECTION, SOLUTION INTRAMUSCULAR; INTRAVENOUS at 18:32

## 2024-08-11 RX ADMIN — HYDROMORPHONE HYDROCHLORIDE 1 MG: 1 INJECTION, SOLUTION INTRAMUSCULAR; INTRAVENOUS; SUBCUTANEOUS at 11:44

## 2024-08-11 RX ADMIN — Medication 10 ML: at 10:22

## 2024-08-11 RX ADMIN — HYDROMORPHONE HYDROCHLORIDE 1 MG: 1 INJECTION, SOLUTION INTRAMUSCULAR; INTRAVENOUS; SUBCUTANEOUS at 07:36

## 2024-08-11 NOTE — PLAN OF CARE
Problem: Adult Inpatient Plan of Care  Goal: Plan of Care Review  Outcome: Ongoing, Progressing  Flowsheets (Taken 8/11/2024 6634)  Progress: improving  Plan of Care Reviewed With: patient   Goal Outcome Evaluation:  Plan of Care Reviewed With: patient        Progress: improving

## 2024-08-11 NOTE — PROGRESS NOTES
Referring Provider: Danilo Guerra MD    Reason for follow-up: ?  Atrial fibrillation     Patient Care Team:  Provider, No Known as PCP - General      SUBJECTIVE  Resting comfortably in bed.  Vital signs are stable.     ROS  Review of all systems negative except as indicated.    Since I have last seen, the patient has been without any chest discomfort, shortness of breath, palpitations, dizziness or syncope.  Denies having any headache, abdominal pain, nausea, vomiting, diarrhea, constipation, loss of weight or loss of appetite.  Denies having any excessive bruising, hematuria or blood in the stool.        Personal History:    Past Medical History:   Diagnosis Date    Alcohol abuse 11/08/2022    Anemia of chronic disease 07/29/2024    Chronic GERD 08/09/2024    Chronic hepatitis C virus infection 11/08/2022    HAVP Negative 11/03/2022     HEPBSAG Negative 11/03/2022     HEPBCAB Negative 11/03/2022     HEPC10 Positive (A) 11/03/2022             Cirrhosis     Colitis 07/17/2024    Elevated LFTs 11/06/2019    Esophageal varices     Lesion of vocal fold 07/29/2024    Major depressive disorder, recurrent episode, moderate degree 02/17/2024    Neuropathy 08/09/2024    Pelvic congestion syndrome 07/28/2023    Portal vein thrombosis 07/11/2024    Sepsis without acute organ dysfunction 11/03/2022       History reviewed. No pertinent surgical history.    Family History   Family history unknown: Yes       Social History     Tobacco Use    Smoking status: Every Day     Types: Cigarettes    Smokeless tobacco: Current   Vaping Use    Vaping status: Every Day    Substances: Nicotine, THC, CBD, Flavoring    Devices: Disposable   Substance Use Topics    Alcohol use: Yes     Comment: Daily - 1-2 pints a day currently    Drug use: Yes     Types: Marijuana        Home meds:  Prior to Admission medications    Medication Sig Start Date End Date Taking? Authorizing Provider   folic acid (FOLVITE) 1 MG tablet Take 1 tablet by mouth  Daily.  Patient not taking: Reported on 8/9/2024    Varsha Costello MD   furosemide (LASIX) 40 MG tablet Take 1 tablet by mouth Daily.  Patient not taking: Reported on 8/9/2024    Varsha Costello MD   gabapentin (NEURONTIN) 300 MG capsule Take 1 capsule by mouth 3 (Three) Times a Day.  Patient not taking: Reported on 8/9/2024    Varsha Costello MD   HYDROcodone-acetaminophen (NORCO) 5-325 MG per tablet Take 1 tablet by mouth Every 6 (Six) Hours As Needed.  Patient not taking: Reported on 8/9/2024    Varsha Costello MD   hydrOXYzine (ATARAX) 25 MG tablet Take 1 tablet by mouth 3 (Three) Times a Day As Needed for Itching.  Patient not taking: Reported on 8/9/2024    Varsha Costello MD   lactulose (CHRONULAC) 10 GM/15ML solution solution (encephalopathy)  7/22/24   Varsha Costello MD   mirtazapine (REMERON) 30 MG tablet 1 tablet.  Patient not taking: Reported on 8/9/2024 7/30/24   Varsha Costello MD   nicotine polacrilex (NICORETTE) 2 MG gum Take 1 each by mouth Every 2 (Two) Hours As Needed.  Patient not taking: Reported on 8/9/2024 7/30/24 10/28/24  Varsha Costello MD   oxyCODONE (OXY-IR) 5 MG capsule Take 1 capsule by mouth Every 4 (Four) Hours As Needed for Moderate Pain.  Patient not taking: Reported on 8/9/2024    Varsha Costello MD   pantoprazole (PROTONIX) 40 MG EC tablet Take 1 tablet by mouth 2 (Two) Times a Day.  Patient not taking: Reported on 8/9/2024    Varsha Costello MD   propranolol (INDERAL) 10 MG tablet Take 1 tablet by mouth 3 (Three) Times a Day.  Patient not taking: Reported on 8/9/2024    Varsha Costello MD   spironolactone (ALDACTONE) 100 MG tablet Take 1 tablet by mouth Daily.  Patient not taking: Reported on 8/9/2024    Varsha Costello MD       Allergies:  Patient has no known allergies.    Scheduled Meds:albumin human, 37.5 g, Intravenous, Once   Or  albumin human, 50 g, Intravenous, Once   Or  albumin human, 62.5 g,  "Intravenous, Once   Or  albumin human, 75 g, Intravenous, Once   Or  albumin human, 87.5 g, Intravenous, Once   Or  albumin human, 100 g, Intravenous, Once   Or  albumin human, 112.5 g, Intravenous, Once  cefTRIAXone, 2,000 mg, Intravenous, Daily  folic acid, 1 mg, Oral, Daily  furosemide, 40 mg, Intravenous, BID  lactulose, 30 g, Oral, TID  lidocaine, 10 mL, Subcutaneous, Once  [START ON 8/12/2024] LORazepam, 1 mg, Oral, Daily  midodrine, 10 mg, Oral, Q8H  rifAXIMin, 550 mg, Oral, Q12H  sodium chloride, 10 mL, Intravenous, Q12H  spironolactone, 100 mg, Oral, Daily  thiamine (B-1) IV, 200 mg, Intravenous, Q8H   Followed by  [START ON 8/14/2024] thiamine, 100 mg, Oral, Daily  ursodiol, 300 mg, Oral, BID  zinc sulfate, 220 mg, Oral, Daily      Continuous Infusions:   PRN Meds:.  senna-docusate sodium **AND** polyethylene glycol **AND** bisacodyl **AND** bisacodyl    Calcium Replacement - Follow Nurse / BPA Driven Protocol    HYDROmorphone    LORazepam **OR** LORazepam **OR** LORazepam **OR** LORazepam **OR** LORazepam **OR** LORazepam    Magnesium Standard Dose Replacement - Follow Nurse / BPA Driven Protocol    melatonin    ondansetron    phenol    Phosphorus Replacement - Follow Nurse / BPA Driven Protocol    Potassium Replacement - Follow Nurse / BPA Driven Protocol    sodium chloride    sodium chloride    sodium chloride    traMADol      OBJECTIVE    Vital Signs  Vitals:    08/11/24 0400 08/11/24 0500 08/11/24 0600 08/11/24 0735   BP:    118/69   BP Location:    Left arm   Patient Position:    Lying   Pulse: 83 87 94 78   Resp:    12   Temp:    97.9 °F (36.6 °C)   TempSrc:    Oral   SpO2: 92%  96% 95%   Weight:       Height:           Flowsheet Rows      Flowsheet Row First Filed Value   Admission Height 170.2 cm (67\") Documented at 08/08/2024 2109   Admission Weight 93.4 kg (205 lb 14.6 oz) Documented at 08/08/2024 2109              Intake/Output Summary (Last 24 hours) at 8/11/2024 1316  Last data filed at " 8/11/2024 0600  Gross per 24 hour   Intake 840 ml   Output 2000 ml   Net -1160 ml          Telemetry: Sinus rhythm    Physical Exam:  The patient is alert, oriented and in no distress.  Obese  Vital signs as noted above.  Head and neck revealed no carotid bruits or jugular venous distention.  No thyromegaly or lymphadenopathy is present  Lungs clear.  No wheezing.  Breath sounds are normal bilaterally.  Heart normal first and second heart sounds.  No murmur. No precordial rub is present.  No gallop is present.  Abdomen soft and nontender.  No organomegaly is present.  Extremities with good peripheral pulses without any pedal edema.  Skin warm and dry.  Musculoskeletal system is grossly normal.  CNS grossly normal.       Results Review:  I have personally reviewed the results from the time of this admission to 8/11/2024 13:16 EDT and agree with these findings:  []  Laboratory  []  Microbiology  []  Radiology  []  EKG/Telemetry   []  Cardiology/Vascular   []  Pathology  []  Old records  []  Other:    Most notable findings include:    Lab Results (last 24 hours)       Procedure Component Value Units Date/Time    CBC & Differential [[id number]]  (Abnormal) Collected: 08/11/24 0617    Specimen: Blood Updated: 08/11/24 0748    Narrative:      The following orders were created for panel order CBC & Differential.  Procedure                               Abnormality         Status                     ---------                               -----------         ------                     CBC Auto Differential[[id number]]        Abnormal            Final result               Scan Slide[[id number]]                                       Final result                 Please view results for these tests on the individual orders.    Scan Slide [[id number]] Collected: 08/11/24 0617    Specimen: Blood Updated: 08/11/24 0748     Scan Slide --     Comment: See Manual Differential Results       Manual Differential [[id number]]  (Abnormal)  Collected: 08/11/24 0617    Specimen: Blood Updated: 08/11/24 0748     Neutrophil % 77.0 %      Lymphocyte % 20.0 %      Monocyte % 3.0 %      Neutrophils Absolute 5.20 10*3/mm3      Lymphocytes Absolute 1.35 10*3/mm3      Monocytes Absolute 0.20 10*3/mm3      Anisocytosis Slight/1+     Dacrocytes Slight/1+     Macrocytes Slight/1+     Poikilocytes Slight/1+     WBC Morphology Normal     Platelet Estimate Decreased    CBC Auto Differential [[id number]]  (Abnormal) Collected: 08/11/24 0617    Specimen: Blood Updated: 08/11/24 0748     WBC 6.75 10*3/mm3      RBC 2.78 10*6/mm3      Hemoglobin 9.7 g/dL      Hematocrit 30.3 %      .0 fL      MCH 34.9 pg      MCHC 32.0 g/dL      RDW 14.6 %      RDW-SD 59.2 fl      MPV 11.1 fL      Platelets 89 10*3/mm3     Protime-INR [[id number]]  (Abnormal) Collected: 08/11/24 0617    Specimen: Blood Updated: 08/11/24 0737     Protime 15.8 Seconds      INR 1.49    Comprehensive Metabolic Panel [[id number]]  (Abnormal) Collected: 08/11/24 0617    Specimen: Blood Updated: 08/11/24 0733     Glucose 86 mg/dL      BUN 10 mg/dL      Creatinine 0.72 mg/dL      Sodium 136 mmol/L      Potassium 3.9 mmol/L      Chloride 103 mmol/L      CO2 23.5 mmol/L      Calcium 8.8 mg/dL      Total Protein 6.7 g/dL      Albumin 2.5 g/dL      ALT (SGPT) 54 U/L      AST (SGOT) 107 U/L      Alkaline Phosphatase 158 U/L      Total Bilirubin 3.8 mg/dL      Globulin 4.2 gm/dL      A/G Ratio 0.6 g/dL      BUN/Creatinine Ratio 13.9     Anion Gap 9.5 mmol/L      eGFR 105.2 mL/min/1.73     Narrative:      GFR Normal >60  Chronic Kidney Disease <60  Kidney Failure <15      Body Fluid Culture - Body Fluid, Peritoneum [[id number]] Collected: 08/09/24 1225    Specimen: Body Fluid from Peritoneum Updated: 08/11/24 0645     Body Fluid Culture No growth at 2 days     Gram Stain Rare (1+) WBCs seen      No organisms seen    Potassium [[id number]]  (Normal) Collected: 08/10/24 2315    Specimen: Blood Updated: 08/10/24 8057      Potassium 4.0 mmol/L             Imaging Results (Last 24 Hours)       ** No results found for the last 24 hours. **            LAB RESULTS (LAST 7 DAYS)    CBC  Results from last 7 days   Lab Units 08/11/24  0617 08/10/24  0605 08/09/24  0546 08/08/24  2329   WBC 10*3/mm3 6.75 5.17 3.21* 4.43   RBC 10*6/mm3 2.78* 2.56* 2.35* 2.54*   HEMOGLOBIN g/dL 9.7* 9.0* 8.2* 9.2*   HEMATOCRIT % 30.3* 27.9* 26.4* 28.3*   MCV fL 109.0* 109.0* 112.3* 111.4*   PLATELETS 10*3/mm3 89* 71* 63* 74*       BMP  Results from last 7 days   Lab Units 08/11/24  0617 08/10/24  2317 08/10/24  0605 08/09/24  1355 08/09/24  0546 08/08/24  2329   SODIUM mmol/L 136  --  137  --  137 137   POTASSIUM mmol/L 3.9 4.0 3.5  --  3.9 4.2   CHLORIDE mmol/L 103  --  107  --  109* 106   CO2 mmol/L 23.5  --  23.0  --  20.7* 21.4*   BUN mg/dL 10  --  12  --  12 9   CREATININE mg/dL 0.72  --  0.71  --  0.90 0.86   GLUCOSE mg/dL 86  --  103*  --  97 97   MAGNESIUM mg/dL  --   --   --  1.6  --   --        CMP   Results from last 7 days   Lab Units 08/11/24  0617 08/10/24  2317 08/10/24  0605 08/09/24  1355 08/09/24  0546 08/08/24  2329   SODIUM mmol/L 136  --  137  --  137 137   POTASSIUM mmol/L 3.9 4.0 3.5  --  3.9 4.2   CHLORIDE mmol/L 103  --  107  --  109* 106   CO2 mmol/L 23.5  --  23.0  --  20.7* 21.4*   BUN mg/dL 10  --  12  --  12 9   CREATININE mg/dL 0.72  --  0.71  --  0.90 0.86   GLUCOSE mg/dL 86  --  103*  --  97 97   ALBUMIN g/dL 2.5*  --  2.4* 2.6*  --  2.8*   BILIRUBIN mg/dL 3.8*  --  3.1* 3.8*  --  3.9*   ALK PHOS U/L 158*  --  150* 131*  --  166*   AST (SGOT) U/L 107*  --  102* 103*  --  121*   ALT (SGPT) U/L 54*  --  51* 53*  --  61*   LIPASE U/L  --   --   --   --   --  57   AMMONIA umol/L  --   --   --   --  125*  --        BNP        TROPONIN        CoAg  Results from last 7 days   Lab Units 08/11/24  0617 08/09/24  1041   INR  1.49* 1.50*       Creatinine Clearance  Estimated Creatinine Clearance: 118.9 mL/min (by C-G formula based on SCr  of 0.72 mg/dL).    ABG        Radiology  US Liver    Result Date: 8/9/2024  Impression: 1. Nodular liver contour compatible with underlying cirrhosis. No focal liver lesion by ultrasound. 2. Normal hepatopetal flow within the main portal vein. 3. Nonspecific gallbladder wall thickening, likely reactive to underlying liver disease rather than primary cholecystitis. Electronically Signed: Tirso Dawn  8/9/2024 4:14 PM EDT  Workstation ID: [id number]       EKG  I personally viewed and interpreted the patient's EKG/Telemetry data:  ECG 12 Lead Rhythm Change   Final Result   HEART RATE=63  bpm   RR Isabxwml=6877  ms   SD Gfpstuze=485  ms   P Horizontal Axis=28  deg   P Front Axis=31  deg   QRSD Ovobcxvb=210  ms   QT Eoghvoga=426  ms   KAmX=434  ms   QRS Axis=41  deg   T Wave Axis=43  deg   - OTHERWISE NORMAL ECG -   Sinus bradycardia   Ventricular premature complex   No previous ECG available for comparison   Electronically Signed By: Jayson Corbin ([facility]) 2024-08-10 18:10:38   Date and Time of Study:2024-08-09 15:07:55      Telemetry Scan   Final Result      Telemetry Scan   Final Result      Telemetry Scan   Final Result      Telemetry Scan   Final Result      Telemetry Scan   Final Result      Telemetry Scan   Final Result      Telemetry Scan   Final Result      Telemetry Scan   Final Result      Telemetry Scan   Final Result      Telemetry Scan   Final Result            Echocardiogram:    Results for orders placed during the hospital encounter of 08/08/24    Adult Transthoracic Echo Complete W/ Cont if Necessary Per Protocol    Interpretation Summary    Left ventricular systolic function is normal. Calculated left ventricular 3D EF = 61% Left ventricular ejection fraction appears to be 61 - 65%.    Left ventricular diastolic function is consistent with (grade II w/high LAP) pseudonormalization.    Mild to moderate aortic valve stenosis is present. Mean/peak gradient of 17/31 mmHg.  Aortic valve area 1.5 cm²     Estimated right ventricular systolic pressure from tricuspid regurgitation is normal (<35 mmHg).        Stress Test:         Cardiac Catheterization:  No results found for this or any previous visit.         Other:         ASSESSMENT & PLAN:    Principal Problem:    Alcoholic cirrhosis of liver with ascites  Active Problems:    Arrhythmia    Alcohol abuse    Chronic hepatitis C virus infection    Major depressive disorder, recurrent episode, moderate degree    Tobacco abuse    Esophageal varices    Anasarca    Thrombocytopenia    Macrocytic anemia    Hypotension    Alcoholic cirrhosis of liver with ascites and Esophageal varices / Alcohol abuse  Anasarca   History of large volume paracentesis in the past  GI has been consulted for diagnostic and therapeutic paracentesis  Liver ultrasound  Ceftriaxone for SBP  Receiving albumin  IV diuretics  On lactulose and rifaximin for hyperammonemia      Hypotension  Likely secondary to liver disease and possible sepsis  Currently on midodrine with normal blood pressure  Albumin infusion as needed  Will closely monitor blood pressure     Arrhythmia  Questionable paroxysmal A-fib with slow ventricular rate  Currently in sinus rhythm on telemetry  Echocardiogram with preserved LV function, grade 2 diastolic dysfunction  TSH is normal  Unable to tolerate beta-blocker due to bradycardia  Unable to tolerate anticoagulation due to esophageal varices, anemia, thrombocytopenia and high risk of bleeding.  No events on telemetry     Thrombocytopenia / Macrocytic anemia  Likely secondary to chronic liver disease  Platelets 89 and hemoglobin 9.7 today  INR is 1.5  Monitor CBC  Monitor for signs of bleeding     Chronic hepatitis C virus infection  Unsure if patient has received treatment in the past     Tobacco abuse  Smoking cessation counseling provided to the patient      Jayson Corbin MD  08/11/24  13:16 EDT

## 2024-08-11 NOTE — PROGRESS NOTES
[facility] MEDICINE SERVICE  DAILY PROGRESS NOTE    NAME: Maria Elena Byrnes  : 1980  MRN: [id number]      LOS: 1 day     PROVIDER OF SERVICE: Danilo Guerra MD    Chief Complaint: Alcoholic cirrhosis of liver with ascites    Subjective:     Interval History:  History taken from: patient chart  Patient Complaints: Abdominal pain, sore throat  Patient Denies: Nausea, vomiting    Review of Systems:   Review of Systems   Constitutional:  Positive for appetite change. Negative for activity change, chills and diaphoresis.   HENT: Negative.     Eyes: Negative.    Respiratory: Negative.     Cardiovascular: Negative.    Gastrointestinal:  Positive for abdominal distention and abdominal pain. Negative for anal bleeding, blood in stool, nausea, rectal pain and vomiting.   Endocrine: Negative.    Genitourinary: Negative.    Musculoskeletal: Negative.    Skin: Negative.    Allergic/Immunologic: Negative.    Neurological: Negative.    Hematological: Negative.    Psychiatric/Behavioral: Negative.     8/10  Patient with history of alcohol abuse and chronic hepatitis C  Status post paracentesis yesterday  Started on Lasix  Patient will be discharged on Lasix and Aldactone  Hemoglobin stable at 9  Patient sleepy and weak still with right lower quadrant abdominal pain  Patient seen per cardiology for rhythm control as she has gone to A-fib  Patient has been follow-up also per GI  Continue close monitoring today    Seen per GI  Still with stomach cramps  More awake less encephalopathic  Continue Xifaxan and lactulose  Patient with alcohol induced liver cirrhosis and alcoholic hepatitis with hepatic encephalopathy and esophageal varices and ascites  Patient with pancytopenia due to alcohol  Hepatitis C antibody positive  Continue ceftriaxone and folic acid and lactulose and midodrine and Xifaxan and thiamine and standing started on your ursodiol  Still with generalized abdominal discomfort  Follow-up per GI  recommendation  Seen per cardiology for some chest discomfort suppressed  This has resolved now and no cardiac workup needed at this time vital Signs  Temp:  [97.4 °F (36.3 °C)-97.9 °F (36.6 °C)] 97.9 °F (36.6 °C)  Heart Rate:  [] 78  Resp:  [11-12] 12  BP: ()/(67-76) 118/69   Body mass index is 33.99 kg/m².    Physical Exam  Physical Exam  Physical Exam  Vitals and nursing note reviewed.   Constitutional:       Appearance: Normal appearance. She is obese. She is ill-appearing.   HENT:      Mouth/Throat:      Mouth: Mucous membranes are moist.   Cardiovascular:      Rate and Rhythm: Normal rate and regular rhythm.   Pulmonary:      Effort: Pulmonary effort is normal.      Breath sounds: Normal breath sounds.   Abdominal:      General: Abdomen is protuberant. Bowel sounds are normal. There is distension.      Palpations: Abdomen is soft.      Tenderness: There is generalized abdominal tenderness.  Tenderness with movement  Musculoskeletal:         General: Normal range of motion.   Skin:     General: Skin is warm and dry.   Neurological:      General: No focal deficit present.      Mental Status: She is alert and oriented to person, place, and time. Mental status is at baseline.   Psychiatric:         Mood and Affect: Mood normal.         Behavior: Behavior normal.   Scheduled Meds   albumin human, 37.5 g, Intravenous, Once   Or  albumin human, 50 g, Intravenous, Once   Or  albumin human, 62.5 g, Intravenous, Once   Or  albumin human, 75 g, Intravenous, Once   Or  albumin human, 87.5 g, Intravenous, Once   Or  albumin human, 100 g, Intravenous, Once   Or  albumin human, 112.5 g, Intravenous, Once  cefTRIAXone, 2,000 mg, Intravenous, Daily  folic acid, 1 mg, Oral, Daily  furosemide, 40 mg, Intravenous, BID  lactulose, 30 g, Oral, TID  lidocaine, 10 mL, Subcutaneous, Once  LORazepam, 1 mg, Oral, Q12H   Followed by  [START ON 8/12/2024] LORazepam, 1 mg, Oral, Daily  midodrine, 10 mg, Oral, Q8H  rifAXIMin,  550 mg, Oral, Q12H  sodium chloride, 10 mL, Intravenous, Q12H  spironolactone, 100 mg, Oral, Daily  thiamine (B-1) IV, 200 mg, Intravenous, Q8H   Followed by  [START ON 8/14/2024] thiamine, 100 mg, Oral, Daily  ursodiol, 300 mg, Oral, BID  zinc sulfate, 220 mg, Oral, Daily       PRN Meds     senna-docusate sodium **AND** polyethylene glycol **AND** bisacodyl **AND** bisacodyl    Calcium Replacement - Follow Nurse / BPA Driven Protocol    HYDROmorphone    LORazepam **OR** LORazepam **OR** LORazepam **OR** LORazepam **OR** LORazepam **OR** LORazepam    Magnesium Standard Dose Replacement - Follow Nurse / BPA Driven Protocol    melatonin    ondansetron    phenol    Phosphorus Replacement - Follow Nurse / BPA Driven Protocol    Potassium Replacement - Follow Nurse / BPA Driven Protocol    sodium chloride    sodium chloride    sodium chloride    traMADol   Infusions         Diagnostic Data    Results from last 7 days   Lab Units 08/11/24  0617   WBC 10*3/mm3 6.75   HEMOGLOBIN g/dL 9.7*   HEMATOCRIT % 30.3*   PLATELETS 10*3/mm3 89*   GLUCOSE mg/dL 86   CREATININE mg/dL 0.72   BUN mg/dL 10   SODIUM mmol/L 136   POTASSIUM mmol/L 3.9   AST (SGOT) U/L 107*   ALT (SGPT) U/L 54*   ALK PHOS U/L 158*   BILIRUBIN mg/dL 3.8*   ANION GAP mmol/L 9.5       US Liver    Result Date: 8/9/2024  Impression: 1. Nodular liver contour compatible with underlying cirrhosis. No focal liver lesion by ultrasound. 2. Normal hepatopetal flow within the main portal vein. 3. Nonspecific gallbladder wall thickening, likely reactive to underlying liver disease rather than primary cholecystitis. Electronically Signed: Tirso Zavalaelor  8/9/2024 4:14 PM EDT  Workstation ID: [id number]       I reviewed the patient's new clinical results.    Assessment/Plan:     Active and Resolved Problems  Active Hospital Problems    Diagnosis  POA    **Alcoholic cirrhosis of liver with ascites [K70.31]  Yes    Arrhythmia [I49.9]  Yes    Anasarca [R60.1]  Yes     Thrombocytopenia [D69.6]  Yes    Macrocytic anemia [D53.9]  Yes    Hypotension [I95.9]  Yes    Esophageal varices [I85.00]  Yes    Major depressive disorder, recurrent episode, moderate degree [F33.1]  Yes    Chronic hepatitis C virus infection [B18.2]  Yes    Alcohol abuse [F10.10]  Yes    Tobacco abuse [Z72.0]  Yes      Resolved Hospital Problems   No resolved problems to display.       Abdominal pain  Known cirrhosis of the liver due to alcohol abuse  - Ammonia 125  - Alk phos, albumin, AST, ALT, total bili pending for 8/9/2024  - PT 15.9/INR 1.5  - WBC 3.21  - H&H 8.2/26.4 respectively  - Platelets 63  - Hep C antibody reactive  - CT of abdomen and pelvis showed ascites throughout the abdomen with anasarca of the soft tissues related to hepatic failure.  Cirrhotic changes of the liver and diffuse gallbladder wall thickening likely related to underlying liver disease.  - Dilaudid 1 mg every 3 hours as needed pain ordered  - Ultrasound of liver pending  - GI consulted    Alcohol abuse  - Patient currently resident at [facility]  - Patient states she still continues to drink  - [facility] protocol initiated  - Case management consulted    I seen and evaluated this patient this morning.  Patient states that she has been having increased urination due to Lasix given while in the emergency department.  Patient's complaining of throat pain for 1 month since colonoscopy completed, patient also with complaints of abdominal pain.  Chloraseptic Spray, as well as Dilaudid every 3 as needed ordered.  Plan is for patient to go for therapeutic paracentesis today.  Will reevaluate patient, as well as labs in the morning.    VTE Prophylaxis:  Mechanical VTE prophylaxis orders are present.         Code status is   Code Status and Medical Interventions: CPR (Attempt to Resuscitate); Full Support   Ordered at: 08/09/24 0239     Code Status (Patient has no pulse and is not breathing):    CPR (Attempt to Resuscitate)      Medical Interventions (Patient has pulse or is breathing):    Full Support       Plan for disposition: [facility] rehab facility 8/11/2024    Time: 30 minutes    Signature: Electronically signed by Danilo Guerra MD, 08/11/24, 10:10 EDT.  [facility], [place], operated by [facility] Hospitalist Team      Addendum:    I saw and examined the patient in addition to the ELLIOTT.  I agree with assessment and plan with the following addendum:    General Appearance:  Alert, cooperative, no distress, appears stated age  Head:  Normocephalic, without obvious abnormality, atraumatic  Eyes:  PERRL, conjunctiva/corneas clear, EOM's intact, fundi benign, both eyes  Ears:  Normal TM's and external ear canals, both ears  Nose: Nares normal, septum midline, mucosa normal, no drainage or sinus tenderness  Throat: Lips, mucosa, and tongue normal; teeth and gums normal  Neck: Supple, symmetrical, trachea midline, no adenopathy, thyroid: not enlarged, symmetric, no tenderness/mass/nodules, no carotid bruit or JVD  Lungs:   Clear to auscultation bilaterally, respirations unlabored  Heart: Regular rate and rhythm, S1, S2 normal, no murmur, rub or gallop  Abdomen:  Soft, non-tender, bowel sounds active all four quadrants,  no masses, no organomegaly  Extremities: Extremities normal, atraumatic, no cyanosis or edema  Pulses: 2+ and symmetric  Skin: Skin color, texture, turgor normal, no rashes or lesions  Neurologic: Normal      Patient underwent paracentesis today with about 1 L of fluid removed.  This was likely the cause of her abdominal pain.  However she would likely have recurrence of this fluid.  I will start her on IV Lasix and transition her to oral Lasix on discharge.  She would also benefit from low-dose spironolactone on discharge.  Continue pain control.      MD Anil PillaiMatte[facility] Hospitalist [facility]  08/11/24  14:14 EDT

## 2024-08-11 NOTE — PROGRESS NOTES
" LOS: 1 day   Patient Care Team:  Provider, No Known as PCP - General      Subjective    \"OK\"    Interval History:   LABS: Creatinine 0.72.  Sodium and potassium are normal.  TB 3.8 (3.1), alk phos 158 (150),  (102), ALT 54 (51).  INR 1.49.  WBC 6.75, hemoglobin up to 9.7, platelets up to 89.  Maddrey discriminant function 22.7.  MELD NA 18.  Patient states she has been having bowel movements daily.  Her last 1 was last evening.  She denies any nausea, vomiting, diarrhea or constipation.  No events overnight    ROS:   No chest pain, shortness of breath, or cough.         Medication Review:     Current Facility-Administered Medications:     albumin human 25 % IV SOLN 37.5 g, 37.5 g, Intravenous, Once **OR** albumin human 25 % IV SOLN 50 g, 50 g, Intravenous, Once **OR** albumin human 25 % IV SOLN 62.5 g, 62.5 g, Intravenous, Once **OR** albumin human 25 % IV SOLN 75 g, 75 g, Intravenous, Once **OR** albumin human 25 % IV SOLN 87.5 g, 87.5 g, Intravenous, Once **OR** albumin human 25 % IV SOLN 100 g, 100 g, Intravenous, Once **OR** albumin human 25 % IV SOLN 112.5 g, 112.5 g, Intravenous, Once, Farrah Dougherty APRN    sennosides-docusate (PERICOLACE) 8.6-50 MG per tablet 2 tablet, 2 tablet, Oral, BID PRN **AND** polyethylene glycol (MIRALAX) packet 17 g, 17 g, Oral, Daily PRN **AND** bisacodyl (DULCOLAX) EC tablet 5 mg, 5 mg, Oral, Daily PRN **AND** bisacodyl (DULCOLAX) suppository 10 mg, 10 mg, Rectal, Daily PRN, Jenni Fields, MARC    Calcium Replacement - Follow Nurse / BPA Driven Protocol, , Does not apply, PRN, Kimmie Alejo APRN    cefTRIAXone (ROCEPHIN) 2,000 mg in sodium chloride 0.9 % 100 mL MBP, 2,000 mg, Intravenous, Daily, Farrah Dougherty, APRN, Last Rate: 200 mL/hr at 08/11/24 1024, 2,000 mg at 08/11/24 1024    folic acid (FOLVITE) tablet 1 mg, 1 mg, Oral, Daily, Kimmie Alejo, APRN, 1 mg at 08/11/24 1023    furosemide (LASIX) injection 40 mg, 40 mg, Intravenous, BID, Shay Thornton MD, " 40 mg at 24 1023    HYDROmorphone (DILAUDID) injection 1 mg, 1 mg, Intravenous, Q3H PRN, Ophelia Hercules APRN, 1 mg at 24 1144    lactulose (CHRONULAC) 10 GM/15ML solution 30 g, 30 g, Oral, TID, Jenni Fields APRN, 30 g at 24 1023    lidocaine (XYLOCAINE) 1 % injection 10 mL, 10 mL, Subcutaneous, Once, Shay Thornton MD    LORazepam (ATIVAN) tablet 1 mg, 1 mg, Oral, Q1H PRN, 1 mg at 24 0901 **OR** LORazepam (ATIVAN) injection 1 mg, 1 mg, Intravenous, Q1H PRN **OR** LORazepam (ATIVAN) tablet 2 mg, 2 mg, Oral, Q1H PRN **OR** LORazepam (ATIVAN) injection 2 mg, 2 mg, Intravenous, Q1H PRN **OR** LORazepam (ATIVAN) injection 2 mg, 2 mg, Intravenous, Q15 Min PRN **OR** LORazepam (ATIVAN) injection 2 mg, 2 mg, Intramuscular, Q15 Min PRN, Kimmie Alejo APRN    [] LORazepam (ATIVAN) tablet 2 mg, 2 mg, Oral, Q6H, 2 mg at 24 0549 **FOLLOWED BY** [COMPLETED] LORazepam (ATIVAN) tablet 1 mg, 1 mg, Oral, Q6H, 1 mg at 08/10/24 1705 **FOLLOWED BY** [COMPLETED] LORazepam (ATIVAN) tablet 1 mg, 1 mg, Oral, Q12H, 1 mg at 24 1022 **FOLLOWED BY** [START ON 2024] LORazepam (ATIVAN) tablet 1 mg, 1 mg, Oral, Daily, Kimmie Alejo APRN    Magnesium Standard Dose Replacement - Follow Nurse / BPA Driven Protocol, , Does not apply, PRN, Kimmie Alejo APRN    melatonin tablet 5 mg, 5 mg, Oral, Nightly PRN, Jenni Fields APRN, 5 mg at 24    midodrine (PROAMATINE) tablet 10 mg, 10 mg, Oral, Q8H, Jenni Fields APRN, 10 mg at 24 1144    ondansetron (ZOFRAN) injection 4 mg, 4 mg, Intravenous, Q6H PRN, Jenni Fields APRN    phenol (CHLORASEPTIC) 1.4 % liquid 1 spray, 1 spray, Mouth/Throat, Q2H PRN, Ophelia Hercules APRN    Phosphorus Replacement - Follow Nurse / BPA Driven Protocol, , Does not apply, PRN, Kimmie Alejo APRN    Potassium Replacement - Follow Nurse / BPA Driven Protocol, , Does not apply, PRN, Kimmie Alejo APRN    riFAXIMin  (XIFAXAN) tablet 550 mg, 550 mg, Oral, Q12H, Jenni Fields L, APRN, 550 mg at 08/11/24 1022    sodium chloride 0.9 % flush 10 mL, 10 mL, Intravenous, PRN, Kimmie Alejo, APRIKER    sodium chloride 0.9 % flush 10 mL, 10 mL, Intravenous, Q12H, Jenni Fields L, APRN, 10 mL at 08/11/24 1022    sodium chloride 0.9 % flush 10 mL, 10 mL, Intravenous, PRN, Jenni Fields L, APRN    sodium chloride 0.9 % infusion 40 mL, 40 mL, Intravenous, PRN, Jenni Fields L, APRN    spironolactone (ALDACTONE) tablet 100 mg, 100 mg, Oral, Daily, Farrah Dougherty, APRN, 100 mg at 08/11/24 1022    thiamine (B-1) injection 200 mg, 200 mg, Intravenous, Q8H, 200 mg at 08/11/24 0619 **FOLLOWED BY** [START ON 8/14/2024] thiamine (VITAMIN B-1) tablet 100 mg, 100 mg, Oral, Daily, Kimmie Alejo, APRIKER    traMADol (ULTRAM) tablet 50 mg, 50 mg, Oral, Q6H PRN, Shay Thornton MD, 50 mg at 08/10/24 0107    ursodiol (ACTIGALL) capsule 300 mg, 300 mg, Oral, BID, Farrah Dougherty APRN, 300 mg at 08/11/24 1023    zinc sulfate (ZINCATE) capsule 220 mg, 220 mg, Oral, Daily, Farrah Dougherty, APRN, 220 mg at 08/11/24 1023      Objective in hospital bed.  NAD.  No family present.  Still very drowsy and slow to respond.  Difficult to keep her eyes open.    Vital Signs  Temp:  [97.4 °F (36.3 °C)-97.9 °F (36.6 °C)] 97.9 °F (36.6 °C)  Heart Rate:  [] 78  Resp:  [11-12] 12  BP: ()/(67-76) 118/69  Physical Exam:    General Appearance:    Awake and alert, in no acute distress   Head:    Normocephalic, without obvious abnormality   Eyes:          Conjunctivae normal, icteric sclerae   Ears:    Hearing intact   Throat:   No oral lesions, no thrush, oral mucosa moist.  Hoarse voice continues.   Neck:   No adenopathy, supple, no JVD   Lungs:      respirations regular, even and unlabored        Abdomen:      soft, non-tender, no rebound or guarding, non-distended, no hepatosplenomegaly   Rectal:     Deferred   Extremities:   No edema, no cyanosis, no redness  "  Skin:   No bleeding, bruising or rash, + jaundice   Neurologic:   Cranial nerves 2 - 12 grossly intact, no asterixis, sensation   intact        Results Review:    CBC    Results from last 7 days   Lab Units 08/11/24  0617 08/10/24  0605 08/09/24  0546 08/08/24  2329   WBC 10*3/mm3 6.75 5.17 3.21* 4.43   HEMOGLOBIN g/dL 9.7* 9.0* 8.2* 9.2*   PLATELETS 10*3/mm3 89* 71* 63* 74*     CMP   Results from last 7 days   Lab Units 08/11/24  0617 08/10/24  2317 08/10/24  0605 08/09/24  1355 08/09/24  0546 08/08/24  2329   SODIUM mmol/L 136  --  137  --  137 137   POTASSIUM mmol/L 3.9 4.0 3.5  --  3.9 4.2   CHLORIDE mmol/L 103  --  107  --  109* 106   CO2 mmol/L 23.5  --  23.0  --  20.7* 21.4*   BUN mg/dL 10  --  12  --  12 9   CREATININE mg/dL 0.72  --  0.71  --  0.90 0.86   GLUCOSE mg/dL 86  --  103*  --  97 97   ALBUMIN g/dL 2.5*  --  2.4* 2.6*  --  2.8*   BILIRUBIN mg/dL 3.8*  --  3.1* 3.8*  --  3.9*   ALK PHOS U/L 158*  --  150* 131*  --  166*   AST (SGOT) U/L 107*  --  102* 103*  --  121*   ALT (SGPT) U/L 54*  --  51* 53*  --  61*   MAGNESIUM mg/dL  --   --   --  1.6  --   --    LIPASE U/L  --   --   --   --   --  57   AMMONIA umol/L  --   --   --   --  125*  --      Cr Clearance Estimated Creatinine Clearance: 118.9 mL/min (by C-G formula based on SCr of 0.72 mg/dL).  Coag   Results from last 7 days   Lab Units 08/11/24 0617 08/09/24  1041   INR  1.49* 1.50*     HbA1C No results found for: \"HGBA1C\"  Blood Glucose No results found for: \"POCGLU\"  Infection   Results from last 7 days   Lab Units 08/09/24  1225   BODYFLDCX  No growth at 2 days     UA    Results from last 7 days   Lab Units 08/08/24  2357   NITRITE UA  Positive*   WBC UA /HPF 0-2   BACTERIA UA /HPF Trace*   SQUAM EPITHEL UA /HPF 31-50*     Radiology(recent) US Liver    Result Date: 8/9/2024  Impression: 1. Nodular liver contour compatible with underlying cirrhosis. No focal liver lesion by ultrasound. 2. Normal hepatopetal flow within the main portal vein. " 3. Nonspecific gallbladder wall thickening, likely reactive to underlying liver disease rather than primary cholecystitis. Electronically Signed: Tirso Dawn  8/9/2024 4:14 PM EDT  Workstation ID: [id number]           Assessment & Plan   Etoh hepatitis/cirrhosis complicated by HE, esophageal varices, & ascites   Elevated LFT's  Hepatic encephalopathy    Pancytopenia with bandemia   Hep C Ab positive   ETOH abuse      8/9/24 Para 1.2L no signs of SBP      PLAN:  Patient is a 43-year-old female who was at [facility] for alcohol abuse patient was sent in for abdominal pain, abdominal swelling and edematous legs.  Patient was evaluated in the ER found to have elevated LFTs, moderate ascites on CT and cirrhosis.    Maddrey discriminant function 22.7.  MELD NA 18.  Patient continues to urinate well.  Continue Lasix and Aldactone.  Sodium potassium and creatinine remain normal.  LFTs are about the same.  Continue CIWA protocol.  Continued alcohol cessation.  Continue good nutrition with low-sodium diet.  Little else to add from GI standpoint.  Do recommend that she return to rehab upon discharge.      Farrah Dougherty, MARC  08/11/24  12:11 EDT

## 2024-08-12 LAB
ALBUMIN SERPL-MCNC: 2.6 G/DL (ref 3.5–5.2)
ALBUMIN/GLOB SERPL: 0.7 G/DL
ALP SERPL-CCNC: 149 U/L (ref 39–117)
ALT SERPL W P-5'-P-CCNC: 54 U/L (ref 1–33)
ANION GAP SERPL CALCULATED.3IONS-SCNC: 7.9 MMOL/L (ref 5–15)
AST SERPL-CCNC: 104 U/L (ref 1–32)
BILIRUB SERPL-MCNC: 3.7 MG/DL (ref 0–1.2)
BUN SERPL-MCNC: 8 MG/DL (ref 6–20)
BUN/CREAT SERPL: 10.5 (ref 7–25)
CALCIUM SPEC-SCNC: 8.8 MG/DL (ref 8.6–10.5)
CHLORIDE SERPL-SCNC: 99 MMOL/L (ref 98–107)
CO2 SERPL-SCNC: 27.1 MMOL/L (ref 22–29)
CREAT SERPL-MCNC: 0.76 MG/DL (ref 0.57–1)
EGFRCR SERPLBLD CKD-EPI 2021: 98.6 ML/MIN/1.73
GLOBULIN UR ELPH-MCNC: 4 GM/DL
GLUCOSE SERPL-MCNC: 104 MG/DL (ref 65–99)
HCV RNA SERPL NAA+PROBE-ACNC: NORMAL IU/ML
HCV RNA SERPL NAA+PROBE-LOG IU: 6.35 LOG10 IU/ML
LAB AP CASE REPORT: NORMAL
PATH REPORT.FINAL DX SPEC: NORMAL
PATH REPORT.GROSS SPEC: NORMAL
POTASSIUM SERPL-SCNC: 3.5 MMOL/L (ref 3.5–5.2)
PROT SERPL-MCNC: 6.6 G/DL (ref 6–8.5)
SODIUM SERPL-SCNC: 134 MMOL/L (ref 136–145)
TEST INFORMATION: NORMAL

## 2024-08-12 PROCEDURE — 0W9G3ZX DRAINAGE OF PERITONEAL CAVITY, PERCUTANEOUS APPROACH, DIAGNOSTIC: ICD-10-PCS | Performed by: RADIOLOGY

## 2024-08-12 PROCEDURE — 25010000002 FUROSEMIDE PER 20 MG: Performed by: INTERNAL MEDICINE

## 2024-08-12 PROCEDURE — 25010000002 THIAMINE PER 100 MG

## 2024-08-12 PROCEDURE — 93005 ELECTROCARDIOGRAM TRACING: CPT | Performed by: INTERNAL MEDICINE

## 2024-08-12 PROCEDURE — 25010000002 CEFTRIAXONE PER 250 MG: Performed by: NURSE PRACTITIONER

## 2024-08-12 PROCEDURE — 99232 SBSQ HOSP IP/OBS MODERATE 35: CPT | Performed by: INTERNAL MEDICINE

## 2024-08-12 PROCEDURE — 93010 ELECTROCARDIOGRAM REPORT: CPT | Performed by: INTERNAL MEDICINE

## 2024-08-12 PROCEDURE — 25010000002 LORAZEPAM PER 2 MG

## 2024-08-12 PROCEDURE — 80053 COMPREHEN METABOLIC PANEL: CPT | Performed by: NURSE PRACTITIONER

## 2024-08-12 PROCEDURE — 25010000002 HYDROMORPHONE 1 MG/ML SOLUTION: Performed by: NURSE PRACTITIONER

## 2024-08-12 RX ORDER — OXYCODONE HYDROCHLORIDE 5 MG/1
5 TABLET ORAL EVERY 6 HOURS PRN
Status: DISCONTINUED | OUTPATIENT
Start: 2024-08-12 | End: 2024-08-13 | Stop reason: HOSPADM

## 2024-08-12 RX ORDER — POTASSIUM CHLORIDE 20 MEQ/1
20 TABLET, EXTENDED RELEASE ORAL ONCE
Status: COMPLETED | OUTPATIENT
Start: 2024-08-12 | End: 2024-08-12

## 2024-08-12 RX ADMIN — LACTULOSE 30 G: 10 SOLUTION ORAL at 08:12

## 2024-08-12 RX ADMIN — Medication 10 ML: at 21:24

## 2024-08-12 RX ADMIN — LORAZEPAM 1 MG: 1 TABLET ORAL at 13:55

## 2024-08-12 RX ADMIN — FUROSEMIDE 40 MG: 10 INJECTION, SOLUTION INTRAMUSCULAR; INTRAVENOUS at 17:49

## 2024-08-12 RX ADMIN — Medication 10 ML: at 08:12

## 2024-08-12 RX ADMIN — HYDROMORPHONE HYDROCHLORIDE 1 MG: 1 INJECTION, SOLUTION INTRAMUSCULAR; INTRAVENOUS; SUBCUTANEOUS at 06:25

## 2024-08-12 RX ADMIN — MIDODRINE HYDROCHLORIDE 10 MG: 5 TABLET ORAL at 21:21

## 2024-08-12 RX ADMIN — LACTULOSE 30 G: 10 SOLUTION ORAL at 15:56

## 2024-08-12 RX ADMIN — HYDROMORPHONE HYDROCHLORIDE 1 MG: 1 INJECTION, SOLUTION INTRAMUSCULAR; INTRAVENOUS; SUBCUTANEOUS at 03:25

## 2024-08-12 RX ADMIN — LORAZEPAM 2 MG: 1 TABLET ORAL at 21:21

## 2024-08-12 RX ADMIN — OXYCODONE HYDROCHLORIDE 5 MG: 5 TABLET ORAL at 09:35

## 2024-08-12 RX ADMIN — FOLIC ACID 1 MG: 1 TABLET ORAL at 08:05

## 2024-08-12 RX ADMIN — ZINC SULFATE 220 MG (50 MG) CAPSULE 220 MG: CAPSULE at 08:06

## 2024-08-12 RX ADMIN — CEFTRIAXONE 2000 MG: 2 INJECTION, POWDER, FOR SOLUTION INTRAMUSCULAR; INTRAVENOUS at 09:33

## 2024-08-12 RX ADMIN — THIAMINE HYDROCHLORIDE 200 MG: 100 INJECTION, SOLUTION INTRAMUSCULAR; INTRAVENOUS at 06:25

## 2024-08-12 RX ADMIN — FUROSEMIDE 40 MG: 10 INJECTION, SOLUTION INTRAMUSCULAR; INTRAVENOUS at 08:08

## 2024-08-12 RX ADMIN — URSODIOL 300 MG: 300 CAPSULE ORAL at 21:21

## 2024-08-12 RX ADMIN — TRAMADOL HYDROCHLORIDE 50 MG: 50 TABLET ORAL at 19:18

## 2024-08-12 RX ADMIN — OXYCODONE HYDROCHLORIDE 5 MG: 5 TABLET ORAL at 21:21

## 2024-08-12 RX ADMIN — URSODIOL 300 MG: 300 CAPSULE ORAL at 08:05

## 2024-08-12 RX ADMIN — LORAZEPAM 1 MG: 1 TABLET ORAL at 17:56

## 2024-08-12 RX ADMIN — MIDODRINE HYDROCHLORIDE 10 MG: 5 TABLET ORAL at 10:31

## 2024-08-12 RX ADMIN — RIFAXIMIN 550 MG: 550 TABLET ORAL at 21:21

## 2024-08-12 RX ADMIN — RIFAXIMIN 550 MG: 550 TABLET ORAL at 08:05

## 2024-08-12 RX ADMIN — THIAMINE HYDROCHLORIDE 200 MG: 100 INJECTION, SOLUTION INTRAMUSCULAR; INTRAVENOUS at 13:52

## 2024-08-12 RX ADMIN — POTASSIUM CHLORIDE 20 MEQ: 1500 TABLET, EXTENDED RELEASE ORAL at 09:20

## 2024-08-12 RX ADMIN — LORAZEPAM 1 MG: 2 INJECTION INTRAMUSCULAR; INTRAVENOUS at 19:18

## 2024-08-12 RX ADMIN — MIDODRINE HYDROCHLORIDE 10 MG: 5 TABLET ORAL at 03:25

## 2024-08-12 RX ADMIN — OXYCODONE HYDROCHLORIDE 5 MG: 5 TABLET ORAL at 15:55

## 2024-08-12 RX ADMIN — SPIRONOLACTONE 100 MG: 25 TABLET ORAL at 08:05

## 2024-08-12 RX ADMIN — THIAMINE HYDROCHLORIDE 200 MG: 100 INJECTION, SOLUTION INTRAMUSCULAR; INTRAVENOUS at 21:21

## 2024-08-12 RX ADMIN — LACTULOSE 30 G: 10 SOLUTION ORAL at 21:21

## 2024-08-12 RX ADMIN — LORAZEPAM 1 MG: 1 TABLET ORAL at 08:06

## 2024-08-12 NOTE — CASE MANAGEMENT/SOCIAL WORK
Social Work Assessment  [facility]     Patient Name: Maria Elena Byrnes  MRN: [id number]  Today's Date: 8/12/2024    Admit Date: 8/8/2024     Discharge Plan       Row Name 08/12/24 1439       Plan    Plan [facility]. [facility] to transport 08.13.2024 @ noon.      Plan Comments SW met with pt at bedside to discuss d/c plan. Pt agreeable to return to [facility]. SW spoke with Roshan at [facility] ([phone]) and confirmed there are a couple female beds open and they will hold one for pt. They confirmed having pt's belongings (pt is from [place]) and agreed to provide transportation. Tx team updated via SC and pt updated via phone.              Psychosocial       Row Name 08/12/24 1430       Values/Beliefs    Spiritual, Cultural Beliefs, Gnosticism Practices, Values that Affect Care no    Values/Beliefs Comment Pt is not Jewish.       Behavior WDL    Behavior WDL WDL       Mental Health    Little Interest or Pleasure in Doing Things 3-->nearly every day    Feeling Down, Depressed or Hopeless 1-->several days       Speech WDL    Speech WDL WDL       Thought Process WDL    Thought Process WDL WDL       Intellectual Performance WDL    Level of Consciousness Alert       Stress    Do you feel stress - tense, restless, nervous, or anxious, or unable to sleep at night because your mind is troubled all the time - these days? Not at all       Coping/Stress    Major Change/Loss/Stressor chemical dependency/abuse;limited support system    Patient Personal Strengths expressive of emotions;expressive of needs;resilient;future/goal oriented;able to adapt    Sources of Support adult child(emilia);significant other    Techniques to [place] with Loss/Stress/Change diversional activities;substance use    Reaction to Health Status adjusting    Understanding of Condition and Treatment adequate understanding of medical condition       Developmental Stage (Eriksson's)    Developmental Stage Stage 7 (35-65 years/Middle Adulthood) Generativity vs.  "Stagnation       C-SSRS (Recent)    Q1 Wished to be Dead (Past Month) no    Q2 Suicidal Thoughts (Past Month) no    Q6 Suicide Behavior (Lifetime) no       Violence Risk    Feels Like Hurting Others no    Previous Attempt to Harm Others no                   Abuse/Neglect       Row Name 08/12/24 1432       Personal Safety    Feels Unsafe at Home or Work/School no    Feels Threatened by Someone no    Does Anyone Try to Keep You From Having Contact with Others or Doing Things Outside Your Home? no    Physical Signs of Abuse Present no    Personal Safety Comments Pt and dayna are couch surfing with friends. Pt reports feeling safe.                   Legal       Row Name 08/12/24 1433       Financial Resource Strain    How hard is it for you to pay for the very basics like food, housing, medical care, and heating? 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 Pt doesn't have any bills. Pt has applied for income-based housing in [place] and is waiting to hear back.       Legal    Criminal Activity/Legal Involvement none                   Substance Abuse       Row Name 08/12/24 1434       Substance Use    Substance Use Comment Pt smokes 5-6 cig/day and vapes daily, having started smoking at 11 y.o. Pt snorted meth, cocaine, and injected pills in her 20's. Pt \"slowed down\" without rehab. Last drug use was in mid-20's. Pt was up to drinking two fifths of gin daily. She recently checked into [facility] just PTA to for this admission and pt wants to return.       AUDIT-C (Alcohol Use Disorders ID Test)    Q1: How often do you have a drink containing alcohol? 4 or more ti    Q2: How many drinks containing alcohol do you have on a typical day when you are drinking? 10 or more    Q3: How often do you have six or more drinks on one occasion? Daily    Audit-C Score 12       Family Member Substance Use (#4)    Family History of Substance " Use none             SHAYAN Huerta, [facility]  Medical Social Worker  Ph [phone]  Fax [phone]  [email or web address]

## 2024-08-12 NOTE — PLAN OF CARE
Goal Outcome Evaluation:  Plan of Care Reviewed With: patient        Progress: no change  Outcome Evaluation: VSS, pain controlled w/IV dilaudid, CIWA 0-8 overnight, up ad roseanne to [facility], plan to r/t [facility] at discharge.

## 2024-08-12 NOTE — PAYOR COMM NOTE
"This is Inpatient submission for Rosalina Byrnes.    Inpatient admit on 8/10/24.     ER admit.        AUTHORIZATION PENDING:   PLEASE FAX OR CALL DETERMINATION TO CONTACT BELOW:   THANK YOU.      Thea Renner RN, CCM  Utilization Nurse  [place] [id number]   [phone]  Fx [phone]       Rosalina Byrnes (43 y.o. Female)       Date of Birth   1980    Social Security Number       Address   [address] [place], [place] [address]    Home Phone   [phone]    MRN   [id number]       Religious   None    Marital Status   Significant Other                            Admission Date   8/8/24    Admission Type   Emergency    Admitting Provider   Danilo Guerra MD    Attending Provider   Danilo Guerra MD    Department, Room/Bed   Psychiatric 2A PEDIATRICS, 201/1       Discharge Date       Discharge Disposition       Discharge Destination                                 Attending Provider: Danilo Guerra MD    Allergies: No Known Allergies    Isolation: None   Infection: None   Code Status: CPR    Ht: 170.2 cm (67\")   Wt: 98.4 kg (217 lb)    Admission Cmt: None   Principal Problem: Alcoholic cirrhosis of liver with ascites [K70.31]                   Active Insurance as of 8/8/2024       Primary Coverage       Payor Plan Insurance Group Employer/Plan Group    [facility] [place] [facility]        Payor Plan Address Payor Plan Phone Number Payor Plan Fax Number Effective Dates    [address]   8/8/2024 - None Entered    [facility] [phone]         Subscriber Name Subscriber Birth Date Member ID       ROSALINA BYRNES 1980 [id number]                     Emergency Contacts        (Rel.) Home Phone Work Phone Mobile Phone    Nick Galvan (Significant Other) -- -- [phone]                 History & Physical        Jenni Fields, MARC at 08/09/24 0239       Attestation signed by Llanes Alvarez, " MD Raffi at 24 0525    I have reviewed this documentation and agree.                      [facility] Medicine Services  History & Physical    Patient Name: Maria Elena Byrnes  : 1980  MRN: [id number]  Primary Care Physician:  Provider, No Known  Date of admission: 2024  Date and Time of Service: 2024 at 0240      Assessment & Plan      Chief Complaint: Abdominal pain    Plan:    Abdominal pain  Known cirrhosis of the liver due to alcohol abuse  -Patient reports that she drinks 1 pint of gin daily  -Alkaline phosphatase 166, , ALT 61, total bilirubin 3.9  -Hemoglobin 9.2, hematocrit 28.3, platelets 74  -CT of the abdomen and pelvis was reviewed and showed ascites throughout the abdomen with anasarca of the soft tissues related to hepatic failure.  Cirrhotic changes of the liver and diffuse gallbladder wall thickening likely related to underlying liver disease are also noted.  -Ammonia level ordered  -Hepatitis panel ordered  -Start patient on rifaximin and lactulose  -Midodrine ordered  -CIWA protocol ordered  -GI consultation  -Consider therapeutic paracentesis  -Case management consultation; patient will need to return to [facility] at discharge    Abnormal UA  -UA collected in ED was contaminated with 31-50 squamous epithelial cells.  Patient is not complaining of any urinary symptoms.  Abdominal pain is most likely related to cirrhosis rather than a UTI.  She was covered with 1 dose of Rocephin in the ED, will monitor off antibiotics for now.  Urine culture was not ordered as there were 0-2 WBCs in the urine sample.  -If patient develops urinary symptoms, would recommend sending another UA collected by straight catheterization    History of Present Illness     History of Present Illness: Maria Elena Byrnes is a 43 y.o. female with a previous medical history of alcohol abuse and cirrhosis who presented to [facility] on 2024 with complaints of abdominal pain and swelling in her  abdomen and bilateral legs.  She currently resides at [facility] rehab facility for alcohol abuse.  She states that she usually drinks a minimum of 1 pint per day of Gin.  Today, she had 1/2 a pint of gin.  She does state that she has had a paracentesis in the past but does not know how much fluid was removed or any other details regarding her cirrhosis.      In the ED, alkaline phosphatase is 166, calcium 8.0, albumin 2.8, , ALT 61, total bilirubin 3.9, lipase 57, hemoglobin 9.2, hematocrit 28.3.  UA was drawn but was contaminated with 31-50 squamous epithelial cells.  Patient denies urinary symptoms including dysuria, frequency, urgency.  CT of the abdomen pelvis with contrast showed small to moderate amount of ascites throughout the abdomen with anasarca of the soft tissues likely related to hepatic failure.  Cirrhotic changes of the liver and diffuse gallbladder wall thickening likely related to underlying liver disease.  Portal vein is patent.  She is afebrile.  Blood pressure is low ranging 90/58 to 97/59.  Otherwise, vital signs are stable.  Hospitalist was consulted for further management.    On chart review, recent labs were completed on 8/1/24 which showed a hemoglobin of 8.4, hematocrit 25.8, platelets 93. Total Bilirubin was 4, alkaline phosphatase 171, ALT 70, .  She was positive for Hepatitis C in July, 2024.      12 point ROS reviewed and negative except as mentioned above    Objective      Vitals:   Temp:  [97.7 °F (36.5 °C)] 97.7 °F (36.5 °C)  Heart Rate:  [63-79] 63  Resp:  [18] 18  BP: (90-97)/(57-59) 90/58  Body mass index is 32.25 kg/m².    Physical Exam  Vitals and nursing note reviewed.   Constitutional:       Appearance: Normal appearance. She is obese. She is ill-appearing.   HENT:      Mouth/Throat:      Mouth: Mucous membranes are moist.   Cardiovascular:      Rate and Rhythm: Normal rate and regular rhythm.   Pulmonary:      Effort: Pulmonary effort is normal.      Breath  sounds: Normal breath sounds.   Abdominal:      General: Abdomen is protuberant. Bowel sounds are normal. There is distension.      Palpations: Abdomen is soft.      Tenderness: There is generalized abdominal tenderness.   Musculoskeletal:         General: Normal range of motion.   Skin:     General: Skin is warm and dry.   Neurological:      General: No focal deficit present.      Mental Status: She is alert and oriented to person, place, and time. Mental status is at baseline.   Psychiatric:         Mood and Affect: Mood normal.         Behavior: Behavior normal.        Personal History     This is a 43 y.o. female with:    History reviewed. No pertinent past medical history.    History reviewed. No pertinent surgical history.    Active and Resolved Problems  Active Hospital Problems    Diagnosis  POA    **Cirrhosis of liver with ascites [K74.60, R18.8]  Yes      Resolved Hospital Problems   No resolved problems to display.       Family History: family history is not on file. Otherwise pertinent FHx was reviewed and not pertinent to current issue.    Social History:      Home Medications:  Prior to Admission Medications       Prescriptions Last Dose Informant Patient Reported? Taking?    lactulose (CHRONULAC) 10 GM/15ML solution solution (encephalopathy)   Yes Yes    mirtazapine (REMERON) 30 MG tablet   Yes Yes    1 tablet.    nicotine polacrilex (NICORETTE) 2 MG gum   Yes Yes    Take 1 each by mouth Every 2 (Two) Hours As Needed.              Allergies:  No Known Allergies        VTE Prophylaxis:  Mechanical VTE prophylaxis orders are present.        CODE STATUS:    Code Status (Patient has no pulse and is not breathing): CPR (Attempt to Resuscitate)  Medical Interventions (Patient has pulse or is breathing): Full Support        Admission Status:  I believe this patient meets observation status.    I discussed the patient's findings and my recommendations with patient.    Signature:     This document has been  electronically signed by Jenni Fields, KARTIK, APRN, AGACNP-BC on August 9, 2024 02:39 EDT   [facility] Hospitalist Team    Electronically signed by Llanes Alvarez, Carlos, MD at 08/09/24 0525          Emergency Department Notes        Kimmie Alejo APRN at 08/08/24 231       Attestation signed by Toni Almaguer MD at 08/09/24 0537        SUPERVISE: For this patient encounter, I reviewed the APC's documentation, treatment plan, and medical decision making.  Toni Almaguer MD 8/9/2024 05:37 EDT                         Subjective   Chief Complaint   Patient presents with    Abdominal Pain       History of Present Illness  Patient is a 43-year-old female who reports here today for acute abdominal pain with swelling in her abdomen and bilateral legs.  Patient is a resident at [facility] for alcohol.  She is initially from the [facility] but was brought to this [facility] rehab facility.  Patient reports that her drink of choice is gin and she normally drinks a pint or more of gin per day.  Patient has only had half a pint of gin today.  Patient reports that sometime in May or June she had a paracentesis is unsure how much fluid they drained off of her.  Patient does report known liver failure related to alcoholic cirrhosis.  Patient is a poor historian and is unable to advise on historical labs and information.  Review of Systems  Per HPI  History reviewed. No pertinent past medical history.    No Known Allergies    History reviewed. No pertinent surgical history.    History reviewed. No pertinent family history.    Social History     Socioeconomic History    Marital status: Significant Other           Objective   Physical Exam  Vitals and nursing note reviewed.   Constitutional:       General: She is not in acute distress.     Appearance: She is well-developed. She is obese. She is ill-appearing. She is not toxic-appearing or diaphoretic.   HENT:      Head: Normocephalic and atraumatic.       "Mouth/Throat:      Mouth: Mucous membranes are moist.      Pharynx: Oropharynx is clear.   Eyes:      Extraocular Movements: Extraocular movements intact.      Pupils: Pupils are equal, round, and reactive to light.   Cardiovascular:      Rate and Rhythm: Normal rate and regular rhythm.      Heart sounds: Normal heart sounds.   Pulmonary:      Effort: Pulmonary effort is normal.      Breath sounds: Normal breath sounds.   Abdominal:      General: Bowel sounds are normal. There is distension. There is no abdominal bruit. There are no signs of injury.      Palpations: Abdomen is soft.      Tenderness: There is generalized abdominal tenderness. There is no right CVA tenderness, left CVA tenderness, guarding or rebound. Negative signs include Lanza's sign and McBurney's sign.   Genitourinary:     Rectum: Tenderness: .[email or web address].   Musculoskeletal:      Right lower leg: Tenderness present. Edema present.      Left lower leg: Tenderness present. Edema present.   Skin:     General: Skin is warm and dry.      Capillary Refill: Capillary refill takes 2 to 3 seconds.      Coloration: Skin is jaundiced.   Neurological:      General: No focal deficit present.      Mental Status: She is alert.   Psychiatric:         Mood and Affect: Mood normal.         Behavior: Behavior normal.         Procedures          ED Course      BP 95/60   Pulse 67   Temp 97.7 °F (36.5 °C) (Oral)   Resp 18   Ht 170.2 cm (67\")   Wt 93.4 kg (205 lb 14.6 oz)   SpO2 93%   BMI 32.25 kg/m²     BP 95/60   Pulse 67   Temp 97.7 °F (36.5 °C) (Oral)   Resp 18   Ht 170.2 cm (67\")   Wt 93.4 kg (205 lb 14.6 oz)   SpO2 93%   BMI 32.25 kg/m²   Labs Reviewed   COMPREHENSIVE METABOLIC PANEL - Abnormal; Notable for the following components:       Result Value    CO2 21.4 (*)     Calcium 8.0 (*)     Albumin 2.8 (*)     ALT (SGPT) 61 (*)     AST (SGOT) 121 (*)     Alkaline Phosphatase 166 (*)     Total Bilirubin 3.9 (*)     All other components " within normal limits    Narrative:     GFR Normal >60  Chronic Kidney Disease <60  Kidney Failure <15     URINALYSIS W/ MICROSCOPIC IF INDICATED (NO CULTURE) - Abnormal; Notable for the following components:    Color, UA Dark Yellow (*)     Appearance, UA Cloudy (*)     Ketones, UA Trace (*)     Bilirubin, UA Large (3+) (*)     Protein, UA Trace (*)     Leuk Esterase, UA Small (1+) (*)     Nitrite, UA Positive (*)     All other components within normal limits   CBC WITH AUTO DIFFERENTIAL - Abnormal; Notable for the following components:    RBC 2.54 (*)     Hemoglobin 9.2 (*)     Hematocrit 28.3 (*)     .4 (*)     MCH 36.2 (*)     RDW-SD 62.7 (*)     Platelets 74 (*)     All other components within normal limits   MANUAL DIFFERENTIAL - Abnormal; Notable for the following components:    Neutrophil % 34.0 (*)     Lymphocyte % 47.0 (*)     Basophil % 3.0 (*)     Neutrophils Absolute 1.51 (*)     All other components within normal limits   URINALYSIS, MICROSCOPIC ONLY - Abnormal; Notable for the following components:    Bacteria, UA Trace (*)     Squamous Epithelial Cells, UA 31-50 (*)     All other components within normal limits   LIPASE - Normal   PREGNANCY, URINE - Normal   BNP (IN-HOUSE) - Normal    Narrative:     This assay is used as an aid in the diagnosis of individuals suspected of having heart failure. It can be used as an aid in the diagnosis of acute decompensated heart failure (ADHF) in patients presenting with signs and symptoms of ADHF to the emergency department (ED). In addition, NT-proBNP of <300 pg/mL indicates ADHF is not likely.    Age Range Result Interpretation  NT-proBNP Concentration (pg/mL:      <50             Positive            >450                   Gray                 300-450                    Negative             <300    50-75           Positive            >900                  Gray                300-900                  Negative            <300      >75             Positive             >1800                  Gray                300-1800                  Negative            <300   RAINBOW DRAW    Narrative:     The following orders were created for panel order [place] Draw.  Procedure                               Abnormality         Status                     ---------                               -----------         ------                     Green Top (Gel)[[id number]]                                  Final result               Lavender Top[[id number]]                                     Final result               Gold Top - SST[[id number]]                                   Final result               Light Blue Top[[id number]]                                   Final result                 Please view results for these tests on the individual orders.   SCAN SLIDE   AMMONIA   BASIC METABOLIC PANEL   CBC WITH AUTO DIFFERENTIAL   HEPATITIS PANEL, ACUTE   CBC AND DIFFERENTIAL    Narrative:     The following orders were created for panel order CBC & Differential.  Procedure                               Abnormality         Status                     ---------                               -----------         ------                     CBC Auto Differential[[id number]]        Abnormal            Final result               Scan Slide[[id number]]                                       Final result                 Please view results for these tests on the individual orders.   GREEN TOP   LAVENDER TOP   GOLD TOP - SST   LIGHT BLUE TOP   CBC AND DIFFERENTIAL    Narrative:     The following orders were created for panel order CBC & Differential.  Procedure                               Abnormality         Status                     ---------                               -----------         ------                     CBC Auto Differential[[id number]]                                                         Please view results for these tests on the individual orders.     Medications   sodium chloride 0.9 %  flush 10 mL (has no administration in time range)   Magnesium Standard Dose Replacement - Follow Nurse / BPA Driven Protocol (has no administration in time range)   thiamine (B-1) injection 200 mg (200 mg Intravenous Given 8/8/24 2358)     Followed by   thiamine (VITAMIN B-1) tablet 100 mg (has no administration in time range)   folic acid (FOLVITE) tablet 1 mg (has no administration in time range)   LORazepam (ATIVAN) tablet 2 mg (2 mg Oral Given 8/9/24 0000)     Followed by   LORazepam (ATIVAN) tablet 1 mg (has no administration in time range)     Followed by   LORazepam (ATIVAN) tablet 1 mg (has no administration in time range)     Followed by   LORazepam (ATIVAN) tablet 1 mg (has no administration in time range)   LORazepam (ATIVAN) tablet 1 mg (has no administration in time range)     Or   LORazepam (ATIVAN) injection 1 mg (has no administration in time range)     Or   LORazepam (ATIVAN) tablet 2 mg (has no administration in time range)     Or   LORazepam (ATIVAN) injection 2 mg (has no administration in time range)     Or   LORazepam (ATIVAN) injection 2 mg (has no administration in time range)     Or   LORazepam (ATIVAN) injection 2 mg (has no administration in time range)   sorbitol solution 30 mL (has no administration in time range)   Potassium Replacement - Follow Nurse / BPA Driven Protocol (has no administration in time range)   Phosphorus Replacement - Follow Nurse / BPA Driven Protocol (has no administration in time range)   Calcium Replacement - Follow Nurse / BPA Driven Protocol (has no administration in time range)   sodium chloride 0.9 % flush 10 mL (has no administration in time range)   sodium chloride 0.9 % flush 10 mL (has no administration in time range)   sodium chloride 0.9 % infusion 40 mL (has no administration in time range)   sennosides-docusate (PERICOLACE) 8.6-50 MG per tablet 2 tablet (has no administration in time range)     And   polyethylene glycol (MIRALAX) packet 17 g (has no  administration in time range)     And   bisacodyl (DULCOLAX) EC tablet 5 mg (has no administration in time range)     And   bisacodyl (DULCOLAX) suppository 10 mg (has no administration in time range)   ondansetron (ZOFRAN) injection 4 mg (has no administration in time range)   melatonin tablet 5 mg (has no administration in time range)   midodrine (PROAMATINE) tablet 10 mg (has no administration in time range)   riFAXIMin (XIFAXAN) tablet 550 mg (has no administration in time range)   lactulose (CHRONULAC) 10 GM/15ML solution 30 g (has no administration in time range)   sodium chloride 0.9 % bolus 1,000 mL (0 mL Intravenous Stopped 8/9/24 0129)   morphine injection 4 mg (4 mg Intravenous Given 8/8/24 2357)   ondansetron (ZOFRAN) injection 4 mg (4 mg Intravenous Given 8/8/24 2355)   cefTRIAXone (ROCEPHIN) 2,000 mg in sodium chloride 0.9 % 100 mL MBP (0 mg Intravenous Stopped 8/9/24 0239)   iopamidol (ISOVUE-370) 76 % injection 100 mL (100 mL Intravenous Given 8/9/24 0120)     CT Abdomen Pelvis With Contrast    Result Date: 8/9/2024  Impression: 1.Small to moderate amount of ascites present throughout the abdomen and pelvis with anasarca of the soft tissues likely related to volume overload or hepatic failure. 2.Surface nodularity of the liver consistent with cirrhosis. Portal vein is patent. No focal lesions. 3.Diffuse gallbladder wall thickening likely related to underlying liver disease. Acute cholecystitis cannot be excluded though less likely. No evidence of stones or biliary ductal dilatation. 4.Ancillary findings as described above. Electronically Signed: Martine Wright MD  8/9/2024 1:28 AM EDT  Workstation ID: [id number]                                            Medical Decision Making  Problems Addressed:  Alcoholic cirrhosis of liver with ascites: complicated acute illness or injury  Anasarca: complicated acute illness or injury  Anemia, unspecified type: complicated acute illness or injury  Elevated liver  enzymes: complicated acute illness or injury  Hypotension, unspecified hypotension type: complicated acute illness or injury  Thrombocytopenia: complicated acute illness or injury  Urinary tract infection without hematuria, site unspecified: complicated acute illness or injury    Amount and/or Complexity of Data Reviewed  Labs: ordered.  Radiology: ordered.    Risk  OTC drugs.  Prescription drug management.  Decision regarding hospitalization.    Patient presented with abdominal pain and bilateral lower leg edema.  History obtained from patient.    Labs reviewed:  Labs Reviewed   COMPREHENSIVE METABOLIC PANEL - Abnormal; Notable for the following components:       Result Value    CO2 21.4 (*)     Calcium 8.0 (*)     Albumin 2.8 (*)     ALT (SGPT) 61 (*)     AST (SGOT) 121 (*)     Alkaline Phosphatase 166 (*)     Total Bilirubin 3.9 (*)     All other components within normal limits    Narrative:     GFR Normal >60  Chronic Kidney Disease <60  Kidney Failure <15     URINALYSIS W/ MICROSCOPIC IF INDICATED (NO CULTURE) - Abnormal; Notable for the following components:    Color, UA Dark Yellow (*)     Appearance, UA Cloudy (*)     Ketones, UA Trace (*)     Bilirubin, UA Large (3+) (*)     Protein, UA Trace (*)     Leuk Esterase, UA Small (1+) (*)     Nitrite, UA Positive (*)     All other components within normal limits   CBC WITH AUTO DIFFERENTIAL - Abnormal; Notable for the following components:    RBC 2.54 (*)     Hemoglobin 9.2 (*)     Hematocrit 28.3 (*)     .4 (*)     MCH 36.2 (*)     RDW-SD 62.7 (*)     Platelets 74 (*)     All other components within normal limits   MANUAL DIFFERENTIAL - Abnormal; Notable for the following components:    Neutrophil % 34.0 (*)     Lymphocyte % 47.0 (*)     Basophil % 3.0 (*)     Neutrophils Absolute 1.51 (*)     All other components within normal limits   URINALYSIS, MICROSCOPIC ONLY - Abnormal; Notable for the following components:    Bacteria, UA Trace (*)     Squamous  Epithelial Cells, UA 31-50 (*)     All other components within normal limits   LIPASE - Normal   PREGNANCY, URINE - Normal   BNP (IN-HOUSE) - Normal    Narrative:     This assay is used as an aid in the diagnosis of individuals suspected of having heart failure. It can be used as an aid in the diagnosis of acute decompensated heart failure (ADHF) in patients presenting with signs and symptoms of ADHF to the emergency department (ED). In addition, NT-proBNP of <300 pg/mL indicates ADHF is not likely.    Age Range Result Interpretation  NT-proBNP Concentration (pg/mL:      <50             Positive            >450                   Gray                 300-450                    Negative             <300    50-75           Positive            >900                  Gray                300-900                  Negative            <300      >75             Positive            >1800                  Gray                300-1800                  Negative            <300   RAINBOW DRAW    Narrative:     The following orders were created for panel order [place] Draw.  Procedure                               Abnormality         Status                     ---------                               -----------         ------                     Green Top (Gel)[[id number]]                                  Final result               Lavender Top[[id number]]                                     Final result               Gold Top - SST[[id number]]                                   Final result               Light Blue Top[[id number]]                                   Final result                 Please view results for these tests on the individual orders.   SCAN SLIDE   AMMONIA   BASIC METABOLIC PANEL   CBC WITH AUTO DIFFERENTIAL   HEPATITIS PANEL, ACUTE   CBC AND DIFFERENTIAL    Narrative:     The following orders were created for panel order CBC & Differential.  Procedure                               Abnormality         Status                      ---------                               -----------         ------                     CBC Auto Differential[[id number]]        Abnormal            Final result               Scan Slide[[id number]]                                       Final result                 Please view results for these tests on the individual orders.   GREEN TOP   LAVENDER TOP   GOLD TOP - SST   LIGHT BLUE TOP   CBC AND DIFFERENTIAL    Narrative:     The following orders were created for panel order CBC & Differential.  Procedure                               Abnormality         Status                     ---------                               -----------         ------                     CBC Auto Differential[[id number]]                                                         Please view results for these tests on the individual orders.         Imaging reviewed: CT Abdomen Pelvis With Contrast    Result Date: 8/9/2024  Impression: 1.Small to moderate amount of ascites present throughout the abdomen and pelvis with anasarca of the soft tissues likely related to volume overload or hepatic failure. 2.Surface nodularity of the liver consistent with cirrhosis. Portal vein is patent. No focal lesions. 3.Diffuse gallbladder wall thickening likely related to underlying liver disease. Acute cholecystitis cannot be excluded though less likely. No evidence of stones or biliary ductal dilatation. 4.Ancillary findings as described above. Electronically Signed: Martine Wright MD  8/9/2024 1:28 AM EDT  Workstation ID: [id number]     Attempted to review previous records however patient is from [place].  Could see some labs however not enough to piece together full story.    Differential diagnosis considered: Alcohol withdrawal, alcoholic cirrhosis, congestive heart failure    Patient was treated with Zofran, morphine, fluid bolus, IV Rocephin, Ativan and thiamine injection while in the emergency room.     IV established labs were obtained to show  a CBC with hemoglobin 9.2 hematocrit of 28.3 platelets of 74.No recent labs to compare with the exception of a hemoglobin of 8.5 recorded 10 days ago.  CMP shows calcium of 8 albumin of 2.8 ALT 61  alk phos 166 total bilirubin 3.9.  Patient has known cirrhosis of the liver related to Alcohol abuse.  8 days ago patient labs to show calcium of 7.8 however CMP not drawn just a basic therefore cannot confirm with albumin, ALT, AST, alk phos, or bilirubin.  Urinalysis shows dark cloudy urine with trace ketones large bili trace protein 1+ leukocytes nitrite positive with trace bacteria and 31-50 squamous epithelials.  As patient is symptomatic and nitrite positive treated with IV Rocephin in the ED.  Admitting provider to follow-up.    All labs and results with patient as well as CT of abdomen pelvis to show as above.  Will admit patient based on presentation labs and CT results for further evaluation workup and management.  Patient agreeable to this plan and ready for admission at this time.    Discussed case with MARC Arteaga hospitalist, who agreed to admit patient.     Final diagnoses:   Elevated liver enzymes   Urinary tract infection without hematuria, site unspecified   Anemia, unspecified type   Thrombocytopenia   Anasarca   Hypotension, unspecified hypotension type   Alcoholic cirrhosis of liver with ascites       ED Disposition  ED Disposition       ED Disposition   Decision to Admit    Condition   --    Comment   Level of Care: Med/Surg [1]   Diagnosis: Cirrhosis of liver with ascites [[id number]]                 No follow-up provider specified.       Medication List      No changes were made to your prescriptions during this visit.            Kimmie Alejo APRN  08/09/24 3148      Electronically signed by Toni Almaguer MD at 08/09/24 0590       Operative/Procedure Notes (last 48 hours)  Notes from 08/10/24 0955 through 08/12/24 0955   No notes of this type exist for this encounter.          Physician  Progress Notes (last 48 hours)        Danilo Guerra MD at 24 0914              [facility] MEDICINE SERVICE  DAILY PROGRESS NOTE    NAME: Maria Elena Byrnes  : 1980  MRN: [id number]      LOS: 2 days     PROVIDER OF SERVICE: Danilo Guerra MD    Chief Complaint: Alcoholic cirrhosis of liver with ascites    Subjective:     Interval History:  History taken from: patient chart  Patient Complaints: Abdominal pain, sore throat  Patient Denies: Nausea, vomiting    Review of Systems:   Review of Systems   Constitutional:  Positive for appetite change. Negative for activity change, chills and diaphoresis.   HENT: Negative.     Eyes: Negative.    Respiratory: Negative.     Cardiovascular: Negative.    Gastrointestinal:  Positive for abdominal distention and abdominal pain. Negative for anal bleeding, blood in stool, nausea, rectal pain and vomiting.   Endocrine: Negative.    Genitourinary: Negative.    Musculoskeletal: Negative.    Skin: Negative.    Allergic/Immunologic: Negative.    Neurological: Negative.    Hematological: Negative.    Psychiatric/Behavioral: Negative.     8/10  Patient with history of alcohol abuse and chronic hepatitis C  Status post paracentesis yesterday  Started on Lasix  Patient will be discharged on Lasix and Aldactone  Hemoglobin stable at 9  Patient sleepy and weak still with right lower quadrant abdominal pain  Patient seen per cardiology for rhythm control as she has gone to A-fib  Patient has been follow-up also per GI  Continue close monitoring today    Seen per GI  Still with stomach cramps  More awake less encephalopathic  Continue Xifaxan and lactulose  Patient with alcohol induced liver cirrhosis and alcoholic hepatitis with hepatic encephalopathy and esophageal varices and ascites  Patient with pancytopenia due to alcohol  Hepatitis C antibody positive  Continue ceftriaxone and folic acid and lactulose and midodrine and Xifaxan and thiamine and standing started on your  ursodiol  Still with generalized abdominal discomfort  Follow-up per GI recommendation  Seen per cardiology for some chest discomfort     8/12  Patient more awake  Potassium 3.5  Mental status has improved  [facility] for alcohol  She can be discharged back to the facility when bed is available  Management for discharge planning  Temp:  [97.8 °F (36.6 °C)-98.3 °F (36.8 °C)] 98.2 °F (36.8 °C)  Heart Rate:  [85-96] 89  Resp:  [12-17] 17  BP: (117-126)/(62-86) 122/62   Body mass index is 33.99 kg/m².    Physical Exam  Physical Exam  Physical Exam  Vitals and nursing note reviewed.   Constitutional:       Appearance: Normal appearance. She is obese. She is ill-appearing.   HENT:      Mouth/Throat:      Mouth: Mucous membranes are moist.   Cardiovascular:      Rate and Rhythm: Normal rate and regular rhythm.   Pulmonary:      Effort: Pulmonary effort is normal.      Breath sounds: Normal breath sounds.   Abdominal:      General: Abdomen is protuberant. Bowel sounds are normal. There is distension.      Palpations: Abdomen is soft.      Tenderness: There is generalized abdominal tenderness.  Tenderness with movement  Musculoskeletal:         General: Normal range of motion.   Skin:     General: Skin is warm and dry.   Neurological:      General: No focal deficit present.      Mental Status: She is alert and oriented to person, place, and time. Mental status is at baseline.   Psychiatric:         Mood and Affect: Mood normal.         Behavior: Behavior normal.   Scheduled Meds   albumin human, 37.5 g, Intravenous, Once   Or  albumin human, 50 g, Intravenous, Once   Or  albumin human, 62.5 g, Intravenous, Once   Or  albumin human, 75 g, Intravenous, Once   Or  albumin human, 87.5 g, Intravenous, Once   Or  albumin human, 100 g, Intravenous, Once   Or  albumin human, 112.5 g, Intravenous, Once  cefTRIAXone, 2,000 mg, Intravenous, Daily  folic acid, 1 mg, Oral, Daily  furosemide, 40 mg,  Intravenous, BID  lactulose, 30 g, Oral, TID  lidocaine, 10 mL, Subcutaneous, Once  midodrine, 10 mg, Oral, Q8H  potassium chloride, 20 mEq, Oral, Once  rifAXIMin, 550 mg, Oral, Q12H  sodium chloride, 10 mL, Intravenous, Q12H  spironolactone, 100 mg, Oral, Daily  thiamine (B-1) IV, 200 mg, Intravenous, Q8H   Followed by  [START ON 8/14/2024] thiamine, 100 mg, Oral, Daily  ursodiol, 300 mg, Oral, BID  zinc sulfate, 220 mg, Oral, Daily       PRN Meds     senna-docusate sodium **AND** polyethylene glycol **AND** bisacodyl **AND** bisacodyl    Calcium Replacement - Follow Nurse / BPA Driven Protocol    HYDROmorphone    LORazepam **OR** LORazepam **OR** LORazepam **OR** LORazepam **OR** LORazepam **OR** LORazepam    Magnesium Standard Dose Replacement - Follow Nurse / BPA Driven Protocol    melatonin    ondansetron    phenol    Phosphorus Replacement - Follow Nurse / BPA Driven Protocol    Potassium Replacement - Follow Nurse / BPA Driven Protocol    sodium chloride    sodium chloride    sodium chloride    traMADol   Infusions         Diagnostic Data    Results from last 7 days   Lab Units 08/12/24  0325 08/11/24  0617   WBC 10*3/mm3  --  6.75   HEMOGLOBIN g/dL  --  9.7*   HEMATOCRIT %  --  30.3*   PLATELETS 10*3/mm3  --  89*   GLUCOSE mg/dL 104* 86   CREATININE mg/dL 0.76 0.72   BUN mg/dL 8 10   SODIUM mmol/L 134* 136   POTASSIUM mmol/L 3.5 3.9   AST (SGOT) U/L 104* 107*   ALT (SGPT) U/L 54* 54*   ALK PHOS U/L 149* 158*   BILIRUBIN mg/dL 3.7* 3.8*   ANION GAP mmol/L 7.9 9.5       No radiology results for the last day      I reviewed the patient's new clinical results.    Assessment/Plan:     Active and Resolved Problems  Active Hospital Problems    Diagnosis  POA    **Alcoholic cirrhosis of liver with ascites [K70.31]  Yes    Arrhythmia [I49.9]  Yes    Anasarca [R60.1]  Yes    Thrombocytopenia [D69.6]  Yes    Macrocytic anemia [D53.9]  Yes    Hypotension [I95.9]  Yes    Esophageal varices [I85.00]  Yes    Major  depressive disorder, recurrent episode, moderate degree [F33.1]  Yes    Chronic hepatitis C virus infection [B18.2]  Yes    Alcohol abuse [F10.10]  Yes    Tobacco abuse [Z72.0]  Yes      Resolved Hospital Problems   No resolved problems to display.       Abdominal pain  Known cirrhosis of the liver due to alcohol abuse  - Ammonia 125  - Alk phos, albumin, AST, ALT, total bili pending for 8/9/2024  - PT 15.9/INR 1.5  - WBC 3.21  - H&H 8.2/26.4 respectively  - Platelets 63  - Hep C antibody reactive  - CT of abdomen and pelvis showed ascites throughout the abdomen with anasarca of the soft tissues related to hepatic failure.  Cirrhotic changes of the liver and diffuse gallbladder wall thickening likely related to underlying liver disease.  - Dilaudid 1 mg every 3 hours as needed pain ordered  - Ultrasound of liver pending  - GI consulted    Alcohol abuse  - Patient currently resident at [facility]  - Patient states she still continues to drink  - [facility] protocol initiated  - Case management consulted    I seen and evaluated this patient this morning.  Patient states that she has been having increased urination due to Lasix given while in the emergency department.  Patient's complaining of throat pain for 1 month since colonoscopy completed, patient also with complaints of abdominal pain.  Chloraseptic Spray, as well as Dilaudid every 3 as needed ordered.  Plan is for patient to go for therapeutic paracentesis today.  Will reevaluate patient, as well as labs in the morning.    VTE Prophylaxis:  Mechanical VTE prophylaxis orders are present.         Code status is   Code Status and Medical Interventions: CPR (Attempt to Resuscitate); Full Support   Ordered at: 08/09/24 0239     Code Status (Patient has no pulse and is not breathing):    CPR (Attempt to Resuscitate)     Medical Interventions (Patient has pulse or is breathing):    Full Support       Plan for disposition: [facility]  8/11/2024    Time: 30 minutes    Signature: Electronically signed by Danilo Guerra MD, 08/12/24, 09:14 EDT.  [facility] Hospitalist Team      Addendum:    I saw and examined the patient in addition to the ELLIOTT.  I agree with assessment and plan with the following addendum:    General Appearance:  Alert, cooperative, no distress, appears stated age  Head:  Normocephalic, without obvious abnormality, atraumatic  Eyes:  PERRL, conjunctiva/corneas clear, EOM's intact, fundi benign, both eyes  Ears:  Normal TM's and external ear canals, both ears  Nose: Nares normal, septum midline, mucosa normal, no drainage or sinus tenderness  Throat: Lips, mucosa, and tongue normal; teeth and gums normal  Neck: Supple, symmetrical, trachea midline, no adenopathy, thyroid: not enlarged, symmetric, no tenderness/mass/nodules, no carotid bruit or JVD  Lungs:   Clear to auscultation bilaterally, respirations unlabored  Heart: Regular rate and rhythm, S1, S2 normal, no murmur, rub or gallop  Abdomen:  Soft, non-tender, bowel sounds active all four quadrants,  no masses, no organomegaly  Extremities: Extremities normal, atraumatic, no cyanosis or edema  Pulses: 2+ and symmetric  Skin: Skin color, texture, turgor normal, no rashes or lesions  Neurologic: Normal      Patient underwent paracentesis today with about 1 L of fluid removed.  This was likely the cause of her abdominal pain.  However she would likely have recurrence of this fluid.  I will start her on IV Lasix and transition her to oral Lasix on discharge.  She would also benefit from low-dose spironolactone on discharge.  Continue pain control.      Shay Thornton MD  [facility] Hospitalist Team  08/12/24  14:14 EDT      Electronically signed by Danilo Guerra MD at 08/12/24 0915       Farrah Dougherty APRN at 08/11/24 1211       Attestation signed by Ino Barlow MD at 08/11/24 1258    I have reviewed this documentation and agree.  I have performed the physical and  "decision-making component of this encounter.  Patient with no acute events overnight.  Denies any nausea vomiting diarrhea constipation.  Tolerating her diet  Physical exam  Abdomen is soft, nontender, nondistended  Otherwise benign physical exam  Labs-hemoglobin 9.7, platelets 89, bilirubin 3.8, alkaline phosphatase 158, , ALT 54, INR 1.49  Assessment plan  Alcoholic hepatitis/cirrhosis complicated by hepatic encephalopathy esophageal varices and ascites-encourage 2 g sodium diet with 3 meals, 3 snacks with 1 snack being a Greek yogurt and an Ensure before bedtime.  Elevated LFTs-secondary to above-highly encourage alcohol cessation  Hepatic encephalopathy-improved on lactulose and Xifaxan and zinc  Little else to help with from a GI standpoint.  Patient can return to rehab upon discharge and follow-up in GI clinic in 4 to 6 weeks.  Call with questions                   LOS: 1 day   Patient Care Team:  Provider, No Known as PCP - General      Subjective    \"OK\"    Interval History:   LABS: Creatinine 0.72.  Sodium and potassium are normal.  TB 3.8 (3.1), alk phos 158 (150),  (102), ALT 54 (51).  INR 1.49.  WBC 6.75, hemoglobin up to 9.7, platelets up to 89.  Maddrey discriminant function 22.7.  MELD NA 18.  Patient states she has been having bowel movements daily.  Her last 1 was last evening.  She denies any nausea, vomiting, diarrhea or constipation.  No events overnight    ROS:   No chest pain, shortness of breath, or cough.         Medication Review:     Current Facility-Administered Medications:     albumin human 25 % IV SOLN 37.5 g, 37.5 g, Intravenous, Once **OR** albumin human 25 % IV SOLN 50 g, 50 g, Intravenous, Once **OR** albumin human 25 % IV SOLN 62.5 g, 62.5 g, Intravenous, Once **OR** albumin human 25 % IV SOLN 75 g, 75 g, Intravenous, Once **OR** albumin human 25 % IV SOLN 87.5 g, 87.5 g, Intravenous, Once **OR** albumin human 25 % IV SOLN 100 g, 100 g, Intravenous, Once **OR** " albumin human 25 % IV SOLN 112.5 g, 112.5 g, Intravenous, Once, Farrah Dougherty APRN    sennosides-docusate (PERICOLACE) 8.6-50 MG per tablet 2 tablet, 2 tablet, Oral, BID PRN **AND** polyethylene glycol (MIRALAX) packet 17 g, 17 g, Oral, Daily PRN **AND** bisacodyl (DULCOLAX) EC tablet 5 mg, 5 mg, Oral, Daily PRN **AND** bisacodyl (DULCOLAX) suppository 10 mg, 10 mg, Rectal, Daily PRN, Jenni Fields APRN    Calcium Replacement - Follow Nurse / BPA Driven Protocol, , Does not apply, PRN, Kimmie Alejo APRN    cefTRIAXone (ROCEPHIN) 2,000 mg in sodium chloride 0.9 % 100 mL MBP, 2,000 mg, Intravenous, Daily, Farrah Dougherty APRN, Last Rate: 200 mL/hr at 24 1024, 2,000 mg at 24 1024    folic acid (FOLVITE) tablet 1 mg, 1 mg, Oral, Daily, Kimmie Alejo APRN, 1 mg at 24 1023    furosemide (LASIX) injection 40 mg, 40 mg, Intravenous, BID, Shay Thornton MD, 40 mg at 24 1023    HYDROmorphone (DILAUDID) injection 1 mg, 1 mg, Intravenous, Q3H PRN, Ophelia Hercules APRN, 1 mg at 24 1144    lactulose (CHRONULAC) 10 GM/15ML solution 30 g, 30 g, Oral, TID, Jenni Fields APRN, 30 g at 24 1023    lidocaine (XYLOCAINE) 1 % injection 10 mL, 10 mL, Subcutaneous, Once, Shay Thornton MD    LORazepam (ATIVAN) tablet 1 mg, 1 mg, Oral, Q1H PRN, 1 mg at 24 0901 **OR** LORazepam (ATIVAN) injection 1 mg, 1 mg, Intravenous, Q1H PRN **OR** LORazepam (ATIVAN) tablet 2 mg, 2 mg, Oral, Q1H PRN **OR** LORazepam (ATIVAN) injection 2 mg, 2 mg, Intravenous, Q1H PRN **OR** LORazepam (ATIVAN) injection 2 mg, 2 mg, Intravenous, Q15 Min PRN **OR** LORazepam (ATIVAN) injection 2 mg, 2 mg, Intramuscular, Q15 Min PRN, Kimmie Alejo, MARC    [] LORazepam (ATIVAN) tablet 2 mg, 2 mg, Oral, Q6H, 2 mg at 24 0549 **FOLLOWED BY** [COMPLETED] LORazepam (ATIVAN) tablet 1 mg, 1 mg, Oral, Q6H, 1 mg at 08/10/24 1705 **FOLLOWED BY** [COMPLETED] LORazepam (ATIVAN) tablet 1 mg, 1 mg, Oral,  Q12H, 1 mg at 08/11/24 1022 **FOLLOWED BY** [START ON 8/12/2024] LORazepam (ATIVAN) tablet 1 mg, 1 mg, Oral, Daily, Kimmie Alejo APRN    Magnesium Standard Dose Replacement - Follow Nurse / BPA Driven Protocol, , Does not apply, Melo RILEY Deanna R, APRN    melatonin tablet 5 mg, 5 mg, Oral, Nightly PRN, Jenni Fields APRN, 5 mg at 08/09/24 2020    midodrine (PROAMATINE) tablet 10 mg, 10 mg, Oral, Q8H, Jenni Fields APRN, 10 mg at 08/11/24 1144    ondansetron (ZOFRAN) injection 4 mg, 4 mg, Intravenous, Q6H PRN, Jenni Fields APRN    phenol (CHLORASEPTIC) 1.4 % liquid 1 spray, 1 spray, Mouth/Throat, Q2H PRN, Ophelia Hercules APRN    Phosphorus Replacement - Follow Nurse / BPA Driven Protocol, , Does not apply, PRN, Kimmie Alejo APRN    Potassium Replacement - Follow Nurse / BPA Driven Protocol, , Does not apply, Melo RILEY Deanna R, APRN    riFAXIMin (XIFAXAN) tablet 550 mg, 550 mg, Oral, Q12H, Jenni Fields APRN, 550 mg at 08/11/24 1022    sodium chloride 0.9 % flush 10 mL, 10 mL, Intravenous, PRMelo DONG Deanna R, APRN    sodium chloride 0.9 % flush 10 mL, 10 mL, Intravenous, Q12H, Jenni Fields APRN, 10 mL at 08/11/24 1022    sodium chloride 0.9 % flush 10 mL, 10 mL, Intravenous, PRNDonnie Holly L, APRN    sodium chloride 0.9 % infusion 40 mL, 40 mL, Intravenous, PRN, Jenni Fields APRN    spironolactone (ALDACTONE) tablet 100 mg, 100 mg, Oral, Daily, Farrah Dougherty, APRN, 100 mg at 08/11/24 1022    thiamine (B-1) injection 200 mg, 200 mg, Intravenous, Q8H, 200 mg at 08/11/24 0619 **FOLLOWED BY** [START ON 8/14/2024] thiamine (VITAMIN B-1) tablet 100 mg, 100 mg, Oral, Daily, Kimmie Alejo APRN    traMADol (ULTRAM) tablet 50 mg, 50 mg, Oral, Q6H PRN, Shay Thornton MD, 50 mg at 08/10/24 0107    ursodiol (ACTIGALL) capsule 300 mg, 300 mg, Oral, BID, Farrah Douhgerty APRN, 300 mg at 08/11/24 1023    zinc sulfate (ZINCATE) capsule 220 mg, 220 mg, Oral, Daily, Farhat  Farrah Diane, APRN, 220 mg at 08/11/24 1023      Objective in hospital bed.  NAD.  No family present.  Still very drowsy and slow to respond.  Difficult to keep her eyes open.    Vital Signs  Temp:  [97.4 °F (36.3 °C)-97.9 °F (36.6 °C)] 97.9 °F (36.6 °C)  Heart Rate:  [] 78  Resp:  [11-12] 12  BP: ()/(67-76) 118/69  Physical Exam:    General Appearance:    Awake and alert, in no acute distress   Head:    Normocephalic, without obvious abnormality   Eyes:          Conjunctivae normal, icteric sclerae   Ears:    Hearing intact   Throat:   No oral lesions, no thrush, oral mucosa moist.  Hoarse voice continues.   Neck:   No adenopathy, supple, no JVD   Lungs:      respirations regular, even and unlabored        Abdomen:      soft, non-tender, no rebound or guarding, non-distended, no hepatosplenomegaly   Rectal:     Deferred   Extremities:   No edema, no cyanosis, no redness   Skin:   No bleeding, bruising or rash, + jaundice   Neurologic:   Cranial nerves 2 - 12 grossly intact, no asterixis, sensation   intact        Results Review:    CBC    Results from last 7 days   Lab Units 08/11/24  0617 08/10/24  0605 08/09/24  0546 08/08/24  2329   WBC 10*3/mm3 6.75 5.17 3.21* 4.43   HEMOGLOBIN g/dL 9.7* 9.0* 8.2* 9.2*   PLATELETS 10*3/mm3 89* 71* 63* 74*     CMP   Results from last 7 days   Lab Units 08/11/24  0617 08/10/24  2317 08/10/24  0605 08/09/24  1355 08/09/24  0546 08/08/24  2329   SODIUM mmol/L 136  --  137  --  137 137   POTASSIUM mmol/L 3.9 4.0 3.5  --  3.9 4.2   CHLORIDE mmol/L 103  --  107  --  109* 106   CO2 mmol/L 23.5  --  23.0  --  20.7* 21.4*   BUN mg/dL 10  --  12  --  12 9   CREATININE mg/dL 0.72  --  0.71  --  0.90 0.86   GLUCOSE mg/dL 86  --  103*  --  97 97   ALBUMIN g/dL 2.5*  --  2.4* 2.6*  --  2.8*   BILIRUBIN mg/dL 3.8*  --  3.1* 3.8*  --  3.9*   ALK PHOS U/L 158*  --  150* 131*  --  166*   AST (SGOT) U/L 107*  --  102* 103*  --  121*   ALT (SGPT) U/L 54*  --  51* 53*  --  61*   MAGNESIUM  "mg/dL  --   --   --  1.6  --   --    LIPASE U/L  --   --   --   --   --  57   AMMONIA umol/L  --   --   --   --  125*  --      Cr Clearance Estimated Creatinine Clearance: 118.9 mL/min (by C-G formula based on SCr of 0.72 mg/dL).  Coag   Results from last 7 days   Lab Units 08/11/24  0617 08/09/24  1041   INR  1.49* 1.50*     HbA1C No results found for: \"HGBA1C\"  Blood Glucose No results found for: \"POCGLU\"  Infection   Results from last 7 days   Lab Units 08/09/24  1225   BODYFLDCX  No growth at 2 days     UA    Results from last 7 days   Lab Units 08/08/24  2357   NITRITE UA  Positive*   WBC UA /HPF 0-2   BACTERIA UA /HPF Trace*   SQUAM EPITHEL UA /HPF 31-50*     Radiology(recent) US Liver    Result Date: 8/9/2024  Impression: 1. Nodular liver contour compatible with underlying cirrhosis. No focal liver lesion by ultrasound. 2. Normal hepatopetal flow within the main portal vein. 3. Nonspecific gallbladder wall thickening, likely reactive to underlying liver disease rather than primary cholecystitis. Electronically Signed: Tirso Dawn  8/9/2024 4:14 PM EDT  Workstation ID: [id number]           Assessment & Plan   Etoh hepatitis/cirrhosis complicated by HE, esophageal varices, & ascites   Elevated LFT's  Hepatic encephalopathy    Pancytopenia with bandemia   Hep C Ab positive   ETOH abuse      8/9/24 Para 1.2L no signs of SBP      PLAN:  Patient is a 43-year-old female who was at [facility] for alcohol abuse patient was sent in for abdominal pain, abdominal swelling and edematous legs.  Patient was evaluated in the ER found to have elevated LFTs, moderate ascites on CT and cirrhosis.    Maddrey discriminant function 22.7.  MELD NA 18.  Patient continues to urinate well.  Continue Lasix and Aldactone.  Sodium potassium and creatinine remain normal.  LFTs are about the same.  Continue CIWA protocol.  Continued alcohol cessation.  Continue good nutrition with low-sodium diet.  Little else to add from GI " standpoint.  Do recommend that she return to rehab upon discharge.      Farrah Dougherty, APRN  24  12:11 EDT              Electronically signed by Ino Barlow MD at 24 1525       Danilo Guerra MD at 24 1010              [facility] MEDICINE SERVICE  DAILY PROGRESS NOTE    NAME: Maria Elena Byrnes  : 1980  MRN: [id number]      LOS: 1 day     PROVIDER OF SERVICE: Danilo Guerra MD    Chief Complaint: Alcoholic cirrhosis of liver with ascites    Subjective:     Interval History:  History taken from: patient chart  Patient Complaints: Abdominal pain, sore throat  Patient Denies: Nausea, vomiting    Review of Systems:   Review of Systems   Constitutional:  Positive for appetite change. Negative for activity change, chills and diaphoresis.   HENT: Negative.     Eyes: Negative.    Respiratory: Negative.     Cardiovascular: Negative.    Gastrointestinal:  Positive for abdominal distention and abdominal pain. Negative for anal bleeding, blood in stool, nausea, rectal pain and vomiting.   Endocrine: Negative.    Genitourinary: Negative.    Musculoskeletal: Negative.    Skin: Negative.    Allergic/Immunologic: Negative.    Neurological: Negative.    Hematological: Negative.    Psychiatric/Behavioral: Negative.     8/10  Patient with history of alcohol abuse and chronic hepatitis C  Status post paracentesis yesterday  Started on Lasix  Patient will be discharged on Lasix and Aldactone  Hemoglobin stable at 9  Patient sleepy and weak still with right lower quadrant abdominal pain  Patient seen per cardiology for rhythm control as she has gone to A-fib  Patient has been follow-up also per GI  Continue close monitoring today    Seen per GI  Still with stomach cramps  More awake less encephalopathic  Continue Xifaxan and lactulose  Patient with alcohol induced liver cirrhosis and alcoholic hepatitis with hepatic encephalopathy and esophageal varices and ascites  Patient with pancytopenia due to  alcohol  Hepatitis C antibody positive  Continue ceftriaxone and folic acid and lactulose and midodrine and Xifaxan and thiamine and standing started on your ursodiol  Still with generalized abdominal discomfort  Follow-up per GI recommendation  Seen per cardiology for some chest discomfort suppressed  This has resolved now and no cardiac workup needed at this time vital Signs  Temp:  [97.4 °F (36.3 °C)-97.9 °F (36.6 °C)] 97.9 °F (36.6 °C)  Heart Rate:  [] 78  Resp:  [11-12] 12  BP: ()/(67-76) 118/69   Body mass index is 33.99 kg/m².    Physical Exam  Physical Exam  Physical Exam  Vitals and nursing note reviewed.   Constitutional:       Appearance: Normal appearance. She is obese. She is ill-appearing.   HENT:      Mouth/Throat:      Mouth: Mucous membranes are moist.   Cardiovascular:      Rate and Rhythm: Normal rate and regular rhythm.   Pulmonary:      Effort: Pulmonary effort is normal.      Breath sounds: Normal breath sounds.   Abdominal:      General: Abdomen is protuberant. Bowel sounds are normal. There is distension.      Palpations: Abdomen is soft.      Tenderness: There is generalized abdominal tenderness.  Tenderness with movement  Musculoskeletal:         General: Normal range of motion.   Skin:     General: Skin is warm and dry.   Neurological:      General: No focal deficit present.      Mental Status: She is alert and oriented to person, place, and time. Mental status is at baseline.   Psychiatric:         Mood and Affect: Mood normal.         Behavior: Behavior normal.   Scheduled Meds   albumin human, 37.5 g, Intravenous, Once   Or  albumin human, 50 g, Intravenous, Once   Or  albumin human, 62.5 g, Intravenous, Once   Or  albumin human, 75 g, Intravenous, Once   Or  albumin human, 87.5 g, Intravenous, Once   Or  albumin human, 100 g, Intravenous, Once   Or  albumin human, 112.5 g, Intravenous, Once  cefTRIAXone, 2,000 mg, Intravenous, Daily  folic acid, 1 mg, Oral,  Daily  furosemide, 40 mg, Intravenous, BID  lactulose, 30 g, Oral, TID  lidocaine, 10 mL, Subcutaneous, Once  LORazepam, 1 mg, Oral, Q12H   Followed by  [START ON 8/12/2024] LORazepam, 1 mg, Oral, Daily  midodrine, 10 mg, Oral, Q8H  rifAXIMin, 550 mg, Oral, Q12H  sodium chloride, 10 mL, Intravenous, Q12H  spironolactone, 100 mg, Oral, Daily  thiamine (B-1) IV, 200 mg, Intravenous, Q8H   Followed by  [START ON 8/14/2024] thiamine, 100 mg, Oral, Daily  ursodiol, 300 mg, Oral, BID  zinc sulfate, 220 mg, Oral, Daily       PRN Meds     senna-docusate sodium **AND** polyethylene glycol **AND** bisacodyl **AND** bisacodyl    Calcium Replacement - Follow Nurse / BPA Driven Protocol    HYDROmorphone    LORazepam **OR** LORazepam **OR** LORazepam **OR** LORazepam **OR** LORazepam **OR** LORazepam    Magnesium Standard Dose Replacement - Follow Nurse / BPA Driven Protocol    melatonin    ondansetron    phenol    Phosphorus Replacement - Follow Nurse / BPA Driven Protocol    Potassium Replacement - Follow Nurse / BPA Driven Protocol    sodium chloride    sodium chloride    sodium chloride    traMADol   Infusions         Diagnostic Data    Results from last 7 days   Lab Units 08/11/24  0617   WBC 10*3/mm3 6.75   HEMOGLOBIN g/dL 9.7*   HEMATOCRIT % 30.3*   PLATELETS 10*3/mm3 89*   GLUCOSE mg/dL 86   CREATININE mg/dL 0.72   BUN mg/dL 10   SODIUM mmol/L 136   POTASSIUM mmol/L 3.9   AST (SGOT) U/L 107*   ALT (SGPT) U/L 54*   ALK PHOS U/L 158*   BILIRUBIN mg/dL 3.8*   ANION GAP mmol/L 9.5       US Liver    Result Date: 8/9/2024  Impression: 1. Nodular liver contour compatible with underlying cirrhosis. No focal liver lesion by ultrasound. 2. Normal hepatopetal flow within the main portal vein. 3. Nonspecific gallbladder wall thickening, likely reactive to underlying liver disease rather than primary cholecystitis. Electronically Signed: Tirso Dawn  8/9/2024 4:14 PM EDT  Workstation ID: [id number]       I reviewed the patient's new  clinical results.    Assessment/Plan:     Active and Resolved Problems  Active Hospital Problems    Diagnosis  POA    **Alcoholic cirrhosis of liver with ascites [K70.31]  Yes    Arrhythmia [I49.9]  Yes    Anasarca [R60.1]  Yes    Thrombocytopenia [D69.6]  Yes    Macrocytic anemia [D53.9]  Yes    Hypotension [I95.9]  Yes    Esophageal varices [I85.00]  Yes    Major depressive disorder, recurrent episode, moderate degree [F33.1]  Yes    Chronic hepatitis C virus infection [B18.2]  Yes    Alcohol abuse [F10.10]  Yes    Tobacco abuse [Z72.0]  Yes      Resolved Hospital Problems   No resolved problems to display.       Abdominal pain  Known cirrhosis of the liver due to alcohol abuse  - Ammonia 125  - Alk phos, albumin, AST, ALT, total bili pending for 8/9/2024  - PT 15.9/INR 1.5  - WBC 3.21  - H&H 8.2/26.4 respectively  - Platelets 63  - Hep C antibody reactive  - CT of abdomen and pelvis showed ascites throughout the abdomen with anasarca of the soft tissues related to hepatic failure.  Cirrhotic changes of the liver and diffuse gallbladder wall thickening likely related to underlying liver disease.  - Dilaudid 1 mg every 3 hours as needed pain ordered  - Ultrasound of liver pending  - GI consulted    Alcohol abuse  - Patient currently resident at [facility] alcohol rehab facility  - Patient states she still continues to drink  - [facility] protocol initiated  - Case management consulted    I seen and evaluated this patient this morning.  Patient states that she has been having increased urination due to Lasix given while in the emergency department.  Patient's complaining of throat pain for 1 month since colonoscopy completed, patient also with complaints of abdominal pain.  Chloraseptic Spray, as well as Dilaudid every 3 as needed ordered.  Plan is for patient to go for therapeutic paracentesis today.  Will reevaluate patient, as well as labs in the morning.    VTE Prophylaxis:  Mechanical VTE prophylaxis orders are  present.         Code status is   Code Status and Medical Interventions: CPR (Attempt to Resuscitate); Full Support   Ordered at: 08/09/24 0239     Code Status (Patient has no pulse and is not breathing):    CPR (Attempt to Resuscitate)     Medical Interventions (Patient has pulse or is breathing):    Full Support       Plan for disposition: [facility] rehab facility 8/11/2024    Time: 30 minutes    Signature: Electronically signed by Danilo Guerra MD, 08/11/24, 10:10 EDT.  [facility] Hospitalist Team      Addendum:    I saw and examined the patient in addition to the ELLIOTT.  I agree with assessment and plan with the following addendum:    General Appearance:  Alert, cooperative, no distress, appears stated age  Head:  Normocephalic, without obvious abnormality, atraumatic  Eyes:  PERRL, conjunctiva/corneas clear, EOM's intact, fundi benign, both eyes  Ears:  Normal TM's and external ear canals, both ears  Nose: Nares normal, septum midline, mucosa normal, no drainage or sinus tenderness  Throat: Lips, mucosa, and tongue normal; teeth and gums normal  Neck: Supple, symmetrical, trachea midline, no adenopathy, thyroid: not enlarged, symmetric, no tenderness/mass/nodules, no carotid bruit or JVD  Lungs:   Clear to auscultation bilaterally, respirations unlabored  Heart: Regular rate and rhythm, S1, S2 normal, no murmur, rub or gallop  Abdomen:  Soft, non-tender, bowel sounds active all four quadrants,  no masses, no organomegaly  Extremities: Extremities normal, atraumatic, no cyanosis or edema  Pulses: 2+ and symmetric  Skin: Skin color, texture, turgor normal, no rashes or lesions  Neurologic: Normal      Patient underwent paracentesis today with about 1 L of fluid removed.  This was likely the cause of her abdominal pain.  However she would likely have recurrence of this fluid.  I will start her on IV Lasix and transition her to oral Lasix on discharge.  She would also benefit from low-dose spironolactone on  discharge.  Continue pain control.      Shay Thornton MD  [facility] Hospitalist Team  08/11/24  14:14 EDT      Electronically signed by Danilo Guerra MD at 08/11/24 1012       Jayson Corbin MD at 08/11/24 0712          Referring Provider: Danilo Guerra MD    Reason for follow-up: ?  Atrial fibrillation     Patient Care Team:  Provider, No Known as PCP - General      SUBJECTIVE  Resting comfortably in bed.  Vital signs are stable.     ROS  Review of all systems negative except as indicated.    Since I have last seen, the patient has been without any chest discomfort, shortness of breath, palpitations, dizziness or syncope.  Denies having any headache, abdominal pain, nausea, vomiting, diarrhea, constipation, loss of weight or loss of appetite.  Denies having any excessive bruising, hematuria or blood in the stool.        Personal History:    Past Medical History:   Diagnosis Date    Alcohol abuse 11/08/2022    Anemia of chronic disease 07/29/2024    Chronic GERD 08/09/2024    Chronic hepatitis C virus infection 11/08/2022    HAVP Negative 11/03/2022     HEPBSAG Negative 11/03/2022     HEPBCAB Negative 11/03/2022     HEPC10 Positive (A) 11/03/2022             Cirrhosis     Colitis 07/17/2024    Elevated LFTs 11/06/2019    Esophageal varices     Lesion of vocal fold 07/29/2024    Major depressive disorder, recurrent episode, moderate degree 02/17/2024    Neuropathy 08/09/2024    Pelvic congestion syndrome 07/28/2023    Portal vein thrombosis 07/11/2024    Sepsis without acute organ dysfunction 11/03/2022       History reviewed. No pertinent surgical history.    Family History   Family history unknown: Yes       Social History     Tobacco Use    Smoking status: Every Day     Types: Cigarettes    Smokeless tobacco: Current   Vaping Use    Vaping status: Every Day    Substances: Nicotine, THC, CBD, Flavoring    Devices: Disposable   Substance Use Topics    Alcohol use: Yes     Comment: Daily - 1-2 pints a day  currently    Drug use: Yes     Types: Marijuana        Home meds:  Prior to Admission medications    Medication Sig Start Date End Date Taking? Authorizing Provider   folic acid (FOLVITE) 1 MG tablet Take 1 tablet by mouth Daily.  Patient not taking: Reported on 8/9/2024    Varsha Costello MD   furosemide (LASIX) 40 MG tablet Take 1 tablet by mouth Daily.  Patient not taking: Reported on 8/9/2024    Varsha Costello MD   gabapentin (NEURONTIN) 300 MG capsule Take 1 capsule by mouth 3 (Three) Times a Day.  Patient not taking: Reported on 8/9/2024    Varsha Costello MD   HYDROcodone-acetaminophen (NORCO) 5-325 MG per tablet Take 1 tablet by mouth Every 6 (Six) Hours As Needed.  Patient not taking: Reported on 8/9/2024    Varsha Costello MD   hydrOXYzine (ATARAX) 25 MG tablet Take 1 tablet by mouth 3 (Three) Times a Day As Needed for Itching.  Patient not taking: Reported on 8/9/2024    Varsha Costello MD   lactulose (CHRONULAC) 10 GM/15ML solution solution (encephalopathy)  7/22/24   Varsha Costello MD   mirtazapine (REMERON) 30 MG tablet 1 tablet.  Patient not taking: Reported on 8/9/2024 7/30/24   Varsha Costello MD   nicotine polacrilex (NICORETTE) 2 MG gum Take 1 each by mouth Every 2 (Two) Hours As Needed.  Patient not taking: Reported on 8/9/2024 7/30/24 10/28/24  Varsha Costello MD   oxyCODONE (OXY-IR) 5 MG capsule Take 1 capsule by mouth Every 4 (Four) Hours As Needed for Moderate Pain.  Patient not taking: Reported on 8/9/2024    Varsha Costello MD   pantoprazole (PROTONIX) 40 MG EC tablet Take 1 tablet by mouth 2 (Two) Times a Day.  Patient not taking: Reported on 8/9/2024    Varsha Costello MD   propranolol (INDERAL) 10 MG tablet Take 1 tablet by mouth 3 (Three) Times a Day.  Patient not taking: Reported on 8/9/2024    Varsha Costello MD   spironolactone (ALDACTONE) 100 MG tablet Take 1 tablet by mouth Daily.  Patient not taking: Reported on  8/9/2024    Provider, MD Varsha       Allergies:  Patient has no known allergies.    Scheduled Meds:albumin human, 37.5 g, Intravenous, Once   Or  albumin human, 50 g, Intravenous, Once   Or  albumin human, 62.5 g, Intravenous, Once   Or  albumin human, 75 g, Intravenous, Once   Or  albumin human, 87.5 g, Intravenous, Once   Or  albumin human, 100 g, Intravenous, Once   Or  albumin human, 112.5 g, Intravenous, Once  cefTRIAXone, 2,000 mg, Intravenous, Daily  folic acid, 1 mg, Oral, Daily  furosemide, 40 mg, Intravenous, BID  lactulose, 30 g, Oral, TID  lidocaine, 10 mL, Subcutaneous, Once  [START ON 8/12/2024] LORazepam, 1 mg, Oral, Daily  midodrine, 10 mg, Oral, Q8H  rifAXIMin, 550 mg, Oral, Q12H  sodium chloride, 10 mL, Intravenous, Q12H  spironolactone, 100 mg, Oral, Daily  thiamine (B-1) IV, 200 mg, Intravenous, Q8H   Followed by  [START ON 8/14/2024] thiamine, 100 mg, Oral, Daily  ursodiol, 300 mg, Oral, BID  zinc sulfate, 220 mg, Oral, Daily      Continuous Infusions:   PRN Meds:.  senna-docusate sodium **AND** polyethylene glycol **AND** bisacodyl **AND** bisacodyl    Calcium Replacement - Follow Nurse / BPA Driven Protocol    HYDROmorphone    LORazepam **OR** LORazepam **OR** LORazepam **OR** LORazepam **OR** LORazepam **OR** LORazepam    Magnesium Standard Dose Replacement - Follow Nurse / BPA Driven Protocol    melatonin    ondansetron    phenol    Phosphorus Replacement - Follow Nurse / BPA Driven Protocol    Potassium Replacement - Follow Nurse / BPA Driven Protocol    sodium chloride    sodium chloride    sodium chloride    traMADol      OBJECTIVE    Vital Signs  Vitals:    08/11/24 0400 08/11/24 0500 08/11/24 0600 08/11/24 0735   BP:    118/69   BP Location:    Left arm   Patient Position:    Lying   Pulse: 83 87 94 78   Resp:    12   Temp:    97.9 °F (36.6 °C)   TempSrc:    Oral   SpO2: 92%  96% 95%   Weight:       Height:           Flowsheet Rows      Flowsheet Row First Filed Value   Admission  "Height 170.2 cm (67\") Documented at 08/08/2024 2109   Admission Weight 93.4 kg (205 lb 14.6 oz) Documented at 08/08/2024 2109              Intake/Output Summary (Last 24 hours) at 8/11/2024 1316  Last data filed at 8/11/2024 0600  Gross per 24 hour   Intake 840 ml   Output 2000 ml   Net -1160 ml          Telemetry: Sinus rhythm    Physical Exam:  The patient is alert, oriented and in no distress.  Obese  Vital signs as noted above.  Head and neck revealed no carotid bruits or jugular venous distention.  No thyromegaly or lymphadenopathy is present  Lungs clear.  No wheezing.  Breath sounds are normal bilaterally.  Heart normal first and second heart sounds.  No murmur. No precordial rub is present.  No gallop is present.  Abdomen soft and nontender.  No organomegaly is present.  Extremities with good peripheral pulses without any pedal edema.  Skin warm and dry.  Musculoskeletal system is grossly normal.  CNS grossly normal.       Results Review:  I have personally reviewed the results from the time of this admission to 8/11/2024 13:16 EDT and agree with these findings:  []  Laboratory  []  Microbiology  []  Radiology  []  EKG/Telemetry   []  Cardiology/Vascular   []  Pathology  []  Old records  []  Other:    Most notable findings include:    Lab Results (last 24 hours)       Procedure Component Value Units Date/Time    CBC & Differential [[id number]]  (Abnormal) Collected: 08/11/24 0617    Specimen: Blood Updated: 08/11/24 0748    Narrative:      The following orders were created for panel order CBC & Differential.  Procedure                               Abnormality         Status                     ---------                               -----------         ------                     CBC Auto Differential[[id number]]        Abnormal            Final result               Scan Slide[[id number]]                                       Final result                 Please view results for these tests on the individual " orders.    Scan Slide [[id number]] Collected: 08/11/24 0617    Specimen: Blood Updated: 08/11/24 0748     Scan Slide --     Comment: See Manual Differential Results       Manual Differential [[id number]]  (Abnormal) Collected: 08/11/24 0617    Specimen: Blood Updated: 08/11/24 0748     Neutrophil % 77.0 %      Lymphocyte % 20.0 %      Monocyte % 3.0 %      Neutrophils Absolute 5.20 10*3/mm3      Lymphocytes Absolute 1.35 10*3/mm3      Monocytes Absolute 0.20 10*3/mm3      Anisocytosis Slight/1+     Dacrocytes Slight/1+     Macrocytes Slight/1+     Poikilocytes Slight/1+     WBC Morphology Normal     Platelet Estimate Decreased    CBC Auto Differential [[id number]]  (Abnormal) Collected: 08/11/24 0617    Specimen: Blood Updated: 08/11/24 0748     WBC 6.75 10*3/mm3      RBC 2.78 10*6/mm3      Hemoglobin 9.7 g/dL      Hematocrit 30.3 %      .0 fL      MCH 34.9 pg      MCHC 32.0 g/dL      RDW 14.6 %      RDW-SD 59.2 fl      MPV 11.1 fL      Platelets 89 10*3/mm3     Protime-INR [[id number]]  (Abnormal) Collected: 08/11/24 0617    Specimen: Blood Updated: 08/11/24 0737     Protime 15.8 Seconds      INR 1.49    Comprehensive Metabolic Panel [[id number]]  (Abnormal) Collected: 08/11/24 0617    Specimen: Blood Updated: 08/11/24 0733     Glucose 86 mg/dL      BUN 10 mg/dL      Creatinine 0.72 mg/dL      Sodium 136 mmol/L      Potassium 3.9 mmol/L      Chloride 103 mmol/L      CO2 23.5 mmol/L      Calcium 8.8 mg/dL      Total Protein 6.7 g/dL      Albumin 2.5 g/dL      ALT (SGPT) 54 U/L      AST (SGOT) 107 U/L      Alkaline Phosphatase 158 U/L      Total Bilirubin 3.8 mg/dL      Globulin 4.2 gm/dL      A/G Ratio 0.6 g/dL      BUN/Creatinine Ratio 13.9     Anion Gap 9.5 mmol/L      eGFR 105.2 mL/min/1.73     Narrative:      GFR Normal >60  Chronic Kidney Disease <60  Kidney Failure <15      Body Fluid Culture - Body Fluid, Peritoneum [[id number]] Collected: 08/09/24 1225    Specimen: Body Fluid from Peritoneum Updated:  08/11/24 0645     Body Fluid Culture No growth at 2 days     Gram Stain Rare (1+) WBCs seen      No organisms seen    Potassium [[id number]]  (Normal) Collected: 08/10/24 2317    Specimen: Blood Updated: 08/10/24 2352     Potassium 4.0 mmol/L             Imaging Results (Last 24 Hours)       ** No results found for the last 24 hours. **            LAB RESULTS (LAST 7 DAYS)    CBC  Results from last 7 days   Lab Units 08/11/24  0617 08/10/24  0605 08/09/24  0546 08/08/24 2329   WBC 10*3/mm3 6.75 5.17 3.21* 4.43   RBC 10*6/mm3 2.78* 2.56* 2.35* 2.54*   HEMOGLOBIN g/dL 9.7* 9.0* 8.2* 9.2*   HEMATOCRIT % 30.3* 27.9* 26.4* 28.3*   MCV fL 109.0* 109.0* 112.3* 111.4*   PLATELETS 10*3/mm3 89* 71* 63* 74*       BMP  Results from last 7 days   Lab Units 08/11/24  0617 08/10/24  2317 08/10/24  0605 08/09/24  1355 08/09/24  0546 08/08/24 2329   SODIUM mmol/L 136  --  137  --  137 137   POTASSIUM mmol/L 3.9 4.0 3.5  --  3.9 4.2   CHLORIDE mmol/L 103  --  107  --  109* 106   CO2 mmol/L 23.5  --  23.0  --  20.7* 21.4*   BUN mg/dL 10  --  12  --  12 9   CREATININE mg/dL 0.72  --  0.71  --  0.90 0.86   GLUCOSE mg/dL 86  --  103*  --  97 97   MAGNESIUM mg/dL  --   --   --  1.6  --   --        CMP   Results from last 7 days   Lab Units 08/11/24  0617 08/10/24  2317 08/10/24  0605 08/09/24  1355 08/09/24  0546 08/08/24 2329   SODIUM mmol/L 136  --  137  --  137 137   POTASSIUM mmol/L 3.9 4.0 3.5  --  3.9 4.2   CHLORIDE mmol/L 103  --  107  --  109* 106   CO2 mmol/L 23.5  --  23.0  --  20.7* 21.4*   BUN mg/dL 10  --  12  --  12 9   CREATININE mg/dL 0.72  --  0.71  --  0.90 0.86   GLUCOSE mg/dL 86  --  103*  --  97 97   ALBUMIN g/dL 2.5*  --  2.4* 2.6*  --  2.8*   BILIRUBIN mg/dL 3.8*  --  3.1* 3.8*  --  3.9*   ALK PHOS U/L 158*  --  150* 131*  --  166*   AST (SGOT) U/L 107*  --  102* 103*  --  121*   ALT (SGPT) U/L 54*  --  51* 53*  --  61*   LIPASE U/L  --   --   --   --   --  57   AMMONIA umol/L  --   --   --   --  125*  --         BNP        TROPONIN        CoAg  Results from last 7 days   Lab Units 08/11/24  0617 08/09/24  1041   INR  1.49* 1.50*       Creatinine Clearance  Estimated Creatinine Clearance: 118.9 mL/min (by C-G formula based on SCr of 0.72 mg/dL).    ABG        Radiology  US Liver    Result Date: 8/9/2024  Impression: 1. Nodular liver contour compatible with underlying cirrhosis. No focal liver lesion by ultrasound. 2. Normal hepatopetal flow within the main portal vein. 3. Nonspecific gallbladder wall thickening, likely reactive to underlying liver disease rather than primary cholecystitis. Electronically Signed: Tirso Dawn  8/9/2024 4:14 PM EDT  Workstation ID: [id number]       EKG  I personally viewed and interpreted the patient's EKG/Telemetry data:  ECG 12 Lead Rhythm Change   Final Result   HEART RATE=63  bpm   RR Zsxsrtie=5336  ms   SC Xwghqqxt=900  ms   P Horizontal Axis=28  deg   P Front Axis=31  deg   QRSD Fubdtbdo=360  ms   QT Spntckch=602  ms   HJaR=309  ms   QRS Axis=41  deg   T Wave Axis=43  deg   - OTHERWISE NORMAL ECG -   Sinus bradycardia   Ventricular premature complex   No previous ECG available for comparison   Electronically Signed By: Jayson Corbin ([email or web address]) 2024-08-10 18:10:38   Date and Time of Study:2024-08-09 15:07:55      Telemetry Scan   Final Result      Telemetry Scan   Final Result      Telemetry Scan   Final Result      Telemetry Scan   Final Result      Telemetry Scan   Final Result      Telemetry Scan   Final Result      Telemetry Scan   Final Result      Telemetry Scan   Final Result      Telemetry Scan   Final Result      Telemetry Scan   Final Result            Echocardiogram:    Results for orders placed during the hospital encounter of 08/08/24    Adult Transthoracic Echo Complete W/ Cont if Necessary Per Protocol    Interpretation Summary    Left ventricular systolic function is normal. Calculated left ventricular 3D EF = 61% Left ventricular ejection fraction appears to be 61 -  65%.    Left ventricular diastolic function is consistent with (grade II w/high LAP) pseudonormalization.    Mild to moderate aortic valve stenosis is present. Mean/peak gradient of 17/31 mmHg.  Aortic valve area 1.5 cm²    Estimated right ventricular systolic pressure from tricuspid regurgitation is normal (<35 mmHg).        Stress Test:         Cardiac Catheterization:  No results found for this or any previous visit.         Other:         ASSESSMENT & PLAN:    Principal Problem:    Alcoholic cirrhosis of liver with ascites  Active Problems:    Arrhythmia    Alcohol abuse    Chronic hepatitis C virus infection    Major depressive disorder, recurrent episode, moderate degree    Tobacco abuse    Esophageal varices    Anasarca    Thrombocytopenia    Macrocytic anemia    Hypotension    Alcoholic cirrhosis of liver with ascites and Esophageal varices / Alcohol abuse  Anasarca   History of large volume paracentesis in the past  GI has been consulted for diagnostic and therapeutic paracentesis  Liver ultrasound  Ceftriaxone for SBP  Receiving albumin  IV diuretics  On lactulose and rifaximin for hyperammonemia      Hypotension  Likely secondary to liver disease and possible sepsis  Currently on midodrine with normal blood pressure  Albumin infusion as needed  Will closely monitor blood pressure     Arrhythmia  Questionable paroxysmal A-fib with slow ventricular rate  Currently in sinus rhythm on telemetry  Echocardiogram with preserved LV function, grade 2 diastolic dysfunction  TSH is normal  Unable to tolerate beta-blocker due to bradycardia  Unable to tolerate anticoagulation due to esophageal varices, anemia, thrombocytopenia and high risk of bleeding.  No events on telemetry     Thrombocytopenia / Macrocytic anemia  Likely secondary to chronic liver disease  Platelets 89 and hemoglobin 9.7 today  INR is 1.5  Monitor CBC  Monitor for signs of bleeding     Chronic hepatitis C virus infection  Unsure if patient has  "received treatment in the past     Tobacco abuse  Smoking cessation counseling provided to the patient      Jayson Corbin MD  08/11/24  13:16 EDT                  Electronically signed by Jayson Corbin MD at 08/11/24 1317       Farrah Dougherty APRN at 08/10/24 1318       Attestation signed by Ino Barlow MD at 08/10/24 3059    I have reviewed this documentation and agree.  I performed the physical and decision-making components of this encounter.  Patient with no complaints.  Physical exam  Abdomen is soft, nontender, nondistended  Otherwise benign physical exam  Labs-hemoglobin 9, platelets 71, bilirubin 3.1, alkaline phosphatase 150, , ALT 51, INR 1.5  Assessment and plan  Alcoholic hepatitis/cirrhosis-complicated by hepatic encephalopathy esophageal varices and ascites-continue Lasix and spironolactone.  Alcohol cessation would cover folic acid, B12, multivitamin  Encephalopathy-lactulose and Xifaxan  Alcohol abuse-thiamine and zinc  Elevated LFTs-secondary to above  Hepatitis C antibody positive-viral load pending                 LOS: 0 days   Patient Care Team:  Provider, No Known as PCP - General      Subjective   \"Okay\"    Interval History:   LABS: Sodium 137, potassium 3.5, creatinine 0.71.  TB 3.1 (3.8), alk phos 150 (131),  (103), ALT 51 (53).  WBCs 5.17, hemoglobin 9 (8.2) with an MCV of 109, platelets 71.  aFP 5.83.  MELD NA 17  Paracentesis 8/9/2024 no SBP.  1.2 L removed.  Liver ultrasound-Cirrhosis.  No focal liver lesions by ultrasound.  Has had several bowel movements today.    ROS:   No chest pain, shortness of breath, or cough.         Medication Review:     Current Facility-Administered Medications:     albumin human 25 % IV SOLN 37.5 g, 37.5 g, Intravenous, Once **OR** albumin human 25 % IV SOLN 50 g, 50 g, Intravenous, Once **OR** albumin human 25 % IV SOLN 62.5 g, 62.5 g, Intravenous, Once **OR** albumin human 25 % IV SOLN 75 g, 75 g, Intravenous, Once **OR** albumin human " 25 % IV SOLN 87.5 g, 87.5 g, Intravenous, Once **OR** albumin human 25 % IV SOLN 100 g, 100 g, Intravenous, Once **OR** albumin human 25 % IV SOLN 112.5 g, 112.5 g, Intravenous, Once, Farrah Dougherty APRN    sennosides-docusate (PERICOLACE) 8.6-50 MG per tablet 2 tablet, 2 tablet, Oral, BID PRN **AND** polyethylene glycol (MIRALAX) packet 17 g, 17 g, Oral, Daily PRN **AND** bisacodyl (DULCOLAX) EC tablet 5 mg, 5 mg, Oral, Daily PRN **AND** bisacodyl (DULCOLAX) suppository 10 mg, 10 mg, Rectal, Daily PRN, Jenni Fields APRN    Calcium Replacement - Follow Nurse / BPA Driven Protocol, , Does not apply, PRN, Kimmie Alejo APRN    cefTRIAXone (ROCEPHIN) 2,000 mg in sodium chloride 0.9 % 100 mL MBP, 2,000 mg, Intravenous, Daily, Farrah Dougherty APRN, Last Rate: 200 mL/hr at 08/10/24 0050, 2,000 mg at 08/10/24 0050    folic acid (FOLVITE) tablet 1 mg, 1 mg, Oral, Daily, Kimmie Alejo APRN, 1 mg at 08/10/24 0841    furosemide (LASIX) injection 40 mg, 40 mg, Intravenous, BID, Shay Thornton MD, 40 mg at 08/10/24 0841    HYDROmorphone (DILAUDID) injection 1 mg, 1 mg, Intravenous, Q3H PRN, Ophelia Hercules APRN, 1 mg at 08/10/24 0853    lactulose (CHRONULAC) 10 GM/15ML solution 30 g, 30 g, Oral, TID, Jenni Fields APRN, 30 g at 08/10/24 0844    lidocaine (XYLOCAINE) 1 % injection 10 mL, 10 mL, Subcutaneous, Once, Shay Thornton MD    LORazepam (ATIVAN) tablet 1 mg, 1 mg, Oral, Q1H PRN, 1 mg at 24 0901 **OR** LORazepam (ATIVAN) injection 1 mg, 1 mg, Intravenous, Q1H PRN **OR** LORazepam (ATIVAN) tablet 2 mg, 2 mg, Oral, Q1H PRN **OR** LORazepam (ATIVAN) injection 2 mg, 2 mg, Intravenous, Q1H PRN **OR** LORazepam (ATIVAN) injection 2 mg, 2 mg, Intravenous, Q15 Min PRN **OR** LORazepam (ATIVAN) injection 2 mg, 2 mg, Intramuscular, Q15 Min PRN, Kimmie Alejo, APRN    [] LORazepam (ATIVAN) tablet 2 mg, 2 mg, Oral, Q6H, 2 mg at 24 0549 **FOLLOWED BY** LORazepam (ATIVAN) tablet 1 mg, 1 mg,  Oral, Q6H, 1 mg at 08/10/24 1305 **FOLLOWED BY** LORazepam (ATIVAN) tablet 1 mg, 1 mg, Oral, Q12H **FOLLOWED BY** [START ON 8/12/2024] LORazepam (ATIVAN) tablet 1 mg, 1 mg, Oral, Daily, Kimmie Alejo APRN    Magnesium Standard Dose Replacement - Follow Nurse / BPA Driven Protocol, , Does not apply, PRMelo DONG Deanna R, APRN    melatonin tablet 5 mg, 5 mg, Oral, Nightly PRN, Jenni Fields APRN, 5 mg at 08/09/24 2020    midodrine (PROAMATINE) tablet 10 mg, 10 mg, Oral, Q8H, Jenni Fields APRN, 10 mg at 08/10/24 1305    ondansetron (ZOFRAN) injection 4 mg, 4 mg, Intravenous, Q6H PRN, Jenni Fields APRN    phenol (CHLORASEPTIC) 1.4 % liquid 1 spray, 1 spray, Mouth/Throat, Q2H PRN, Ophelia Hercules APRN    Phosphorus Replacement - Follow Nurse / BPA Driven Protocol, , Does not apply, PRMelo DONG Deanna R, APRN    Potassium Replacement - Follow Nurse / BPA Driven Protocol, , Does not apply, Melo RILEY Deanna R, APRN    riFAXIMin (XIFAXAN) tablet 550 mg, 550 mg, Oral, Q12H, Jenni Fields APRN, 550 mg at 08/10/24 0841    sodium chloride 0.9 % flush 10 mL, 10 mL, Intravenous, PRN, Kimmie Alejo APRN    sodium chloride 0.9 % flush 10 mL, 10 mL, Intravenous, Q12H, Jenni Fields APRN, 10 mL at 08/10/24 0841    sodium chloride 0.9 % flush 10 mL, 10 mL, Intravenous, PRN, Jenni Fields APRN    sodium chloride 0.9 % infusion 40 mL, 40 mL, Intravenous, PRN, Jenni Fields APRN    thiamine (B-1) injection 200 mg, 200 mg, Intravenous, Q8H, 200 mg at 08/10/24 0540 **FOLLOWED BY** [START ON 8/14/2024] thiamine (VITAMIN B-1) tablet 100 mg, 100 mg, Oral, Daily, Kimmie Alejo APRN    traMADol (ULTRAM) tablet 50 mg, 50 mg, Oral, Q6H PRN, Shay Thornton MD, 50 mg at 08/10/24 0107    zinc sulfate (ZINCATE) capsule 220 mg, 220 mg, Oral, Daily, Farrah Dougherty APRN, 220 mg at 08/10/24 0841      Objective in hospital bed.  NAD.  No family present.  Still very drowsy and slow to respond.    Vital  "Signs  Temp:  [97.6 °F (36.4 °C)-98.1 °F (36.7 °C)] 97.6 °F (36.4 °C)  Heart Rate:  [57-88] 84  Resp:  [9-15] 10  BP: ()/(53-81) 110/71  Physical Exam:    General Appearance:    Awake and alert, in no acute distress   Head:    Normocephalic, without obvious abnormality   Eyes:          Conjunctivae normal, anicteric sclerae   Ears:    Hearing intact   Throat:   No oral lesions, no thrush, oral mucosa moist   Neck:   No adenopathy, supple, no JVD   Lungs:      respirations regular, even and unlabored        Abdomen:      soft, non-tender, no rebound or guarding, non-distended, no hepatosplenomegaly   Rectal:     Deferred   Extremities:   No edema, no cyanosis, no redness   Skin:   No bleeding, bruising or rash, no jaundice   Neurologic:   Cranial nerves 2 - 12 grossly intact, no asterixis, sensation   intact        Results Review:    CBC    Results from last 7 days   Lab Units 08/10/24  0605 08/09/24  0546 08/08/24  2329   WBC 10*3/mm3 5.17 3.21* 4.43   HEMOGLOBIN g/dL 9.0* 8.2* 9.2*   PLATELETS 10*3/mm3 71* 63* 74*     CMP   Results from last 7 days   Lab Units 08/10/24  0605 08/09/24  1355 08/09/24  0546 08/08/24  2329   SODIUM mmol/L 137  --  137 137   POTASSIUM mmol/L 3.5  --  3.9 4.2   CHLORIDE mmol/L 107  --  109* 106   CO2 mmol/L 23.0  --  20.7* 21.4*   BUN mg/dL 12  --  12 9   CREATININE mg/dL 0.71  --  0.90 0.86   GLUCOSE mg/dL 103*  --  97 97   ALBUMIN g/dL 2.4* 2.6*  --  2.8*   BILIRUBIN mg/dL 3.1* 3.8*  --  3.9*   ALK PHOS U/L 150* 131*  --  166*   AST (SGOT) U/L 102* 103*  --  121*   ALT (SGPT) U/L 51* 53*  --  61*   MAGNESIUM mg/dL  --  1.6  --   --    LIPASE U/L  --   --   --  57   AMMONIA umol/L  --   --  125*  --      Cr Clearance Estimated Creatinine Clearance: 120.5 mL/min (by C-G formula based on SCr of 0.71 mg/dL).  Coag   Results from last 7 days   Lab Units 08/09/24  1041   INR  1.50*     HbA1C No results found for: \"HGBA1C\"  Blood Glucose No results found for: \"POCGLU\"  Infection "   Results from last 7 days   Lab Units 08/09/24  1225   BODYFLDCX  No growth at less than 24 hours     UA    Results from last 7 days   Lab Units 08/08/24  2357   NITRITE UA  Positive*   WBC UA /HPF 0-2   BACTERIA UA /HPF Trace*   SQUAM EPITHEL UA /HPF 31-50*     Radiology(recent) US Liver    Result Date: 8/9/2024  Impression: 1. Nodular liver contour compatible with underlying cirrhosis. No focal liver lesion by ultrasound. 2. Normal hepatopetal flow within the main portal vein. 3. Nonspecific gallbladder wall thickening, likely reactive to underlying liver disease rather than primary cholecystitis. Electronically Signed: Tirso Dawn  8/9/2024 4:14 PM EDT  Workstation ID: [id number]    CT Abdomen Pelvis With Contrast    Result Date: 8/9/2024  Impression: 1.Small to moderate amount of ascites present throughout the abdomen and pelvis with anasarca of the soft tissues likely related to volume overload or hepatic failure. 2.Surface nodularity of the liver consistent with cirrhosis. Portal vein is patent. No focal lesions. 3.Diffuse gallbladder wall thickening likely related to underlying liver disease. Acute cholecystitis cannot be excluded though less likely. No evidence of stones or biliary ductal dilatation. 4.Ancillary findings as described above. Electronically Signed: Martine Wright MD  8/9/2024 1:28 AM EDT  Workstation ID: [id number]           Assessment & Plan   Etoh hepatitis/cirrhosis complicated by HE, esophageal varices, & ascites   Elevated LFT's  Hepatic encephalopathy    Pancytopenia with bandemia   Hep C Ab positive   ETOH abuse      8/9/24 Para 1.2L no signs of SBP      PLAN:  Patient is a 43-year-old female who was at [facility] for alcohol abuse patient was sent in for abdominal pain, abdominal swelling and edematous legs.  Patient was evaluated in the ER found to have elevated LFTs, moderate ascites on CT and cirrhosis.    MELD-Na 17   Continue Lasix.  Will add Aldactone.  Sodium potassium are  normal and creatinine is 0.71.  CIWA protocol.  Continue alcohol cessation.  Continue ceftriaxone, folic acid, lactulose, midodrine, Xifaxan, thiamine and zinc.  AFP is normal and right upper quadrant ultrasound does not show any liver lesions.  Start ursodiol as per recommendations yesterday.  Patient states she just has a dull generalized abdominal pain.  Nausea and vomiting earlier in the day.  Continue to encourage good nutrition.      Farrah Roxi Dougherty, APRN  08/10/24  13:18 EDT              Electronically signed by Ino Barlow MD at 08/10/24 2179       Danilo Guerra MD at 08/10/24 8595              [facility] MEDICINE SERVICE  DAILY PROGRESS NOTE    NAME: Maria Elena Byrnes  : 1980  MRN: [id number]      LOS: 0 days     PROVIDER OF SERVICE: Danilo Guerra MD    Chief Complaint: Alcoholic cirrhosis of liver with ascites    Subjective:     Interval History:  History taken from: patient chart  Patient Complaints: Abdominal pain, sore throat  Patient Denies: Nausea, vomiting    Review of Systems:   Review of Systems   Constitutional:  Positive for appetite change. Negative for activity change, chills and diaphoresis.   HENT: Negative.     Eyes: Negative.    Respiratory: Negative.     Cardiovascular: Negative.    Gastrointestinal:  Positive for abdominal distention and abdominal pain. Negative for anal bleeding, blood in stool, nausea, rectal pain and vomiting.   Endocrine: Negative.    Genitourinary: Negative.    Musculoskeletal: Negative.    Skin: Negative.    Allergic/Immunologic: Negative.    Neurological: Negative.    Hematological: Negative.    Psychiatric/Behavioral: Negative.     8/10  Patient with history of alcohol abuse and chronic hepatitis C  Status post paracentesis yesterday  Started on Lasix  Patient will be discharged on Lasix and Aldactone  Hemoglobin stable at 9  Patient sleepy and weak still with right lower quadrant abdominal pain  Patient seen per cardiology for rhythm control as she  has gone to A-fib  Patient has been follow-up also per GI  Continue close monitoring today      Objective:     Vital Signs  Temp:  [97.6 °F (36.4 °C)-98.1 °F (36.7 °C)] 97.6 °F (36.4 °C)  Heart Rate:  [57-88] 84  Resp:  [9-15] 10  BP: ()/(53-81) 110/71   Body mass index is 33.99 kg/m².    Physical Exam  Physical Exam  Physical Exam  Vitals and nursing note reviewed.   Constitutional:       Appearance: Normal appearance. She is obese. She is ill-appearing.   HENT:      Mouth/Throat:      Mouth: Mucous membranes are moist.   Cardiovascular:      Rate and Rhythm: Normal rate and regular rhythm.   Pulmonary:      Effort: Pulmonary effort is normal.      Breath sounds: Normal breath sounds.   Abdominal:      General: Abdomen is protuberant. Bowel sounds are normal. There is distension.      Palpations: Abdomen is soft.      Tenderness: There is generalized abdominal tenderness.  Tenderness with movement  Musculoskeletal:         General: Normal range of motion.   Skin:     General: Skin is warm and dry.   Neurological:      General: No focal deficit present.      Mental Status: She is alert and oriented to person, place, and time. Mental status is at baseline.   Psychiatric:         Mood and Affect: Mood normal.         Behavior: Behavior normal.   Scheduled Meds   albumin human, 37.5 g, Intravenous, Once   Or  albumin human, 50 g, Intravenous, Once   Or  albumin human, 62.5 g, Intravenous, Once   Or  albumin human, 75 g, Intravenous, Once   Or  albumin human, 87.5 g, Intravenous, Once   Or  albumin human, 100 g, Intravenous, Once   Or  albumin human, 112.5 g, Intravenous, Once  cefTRIAXone, 2,000 mg, Intravenous, Daily  folic acid, 1 mg, Oral, Daily  furosemide, 40 mg, Intravenous, BID  lactulose, 30 g, Oral, TID  lidocaine, 10 mL, Subcutaneous, Once  LORazepam, 1 mg, Oral, Q6H   Followed by  LORazepam, 1 mg, Oral, Q12H   Followed by  [START ON 8/12/2024] LORazepam, 1 mg, Oral, Daily  midodrine, 10 mg, Oral,  Q8H  potassium chloride, 40 mEq, Oral, Q4H  rifAXIMin, 550 mg, Oral, Q12H  sodium chloride, 10 mL, Intravenous, Q12H  thiamine (B-1) IV, 200 mg, Intravenous, Q8H   Followed by  [START ON 8/14/2024] thiamine, 100 mg, Oral, Daily  zinc sulfate, 220 mg, Oral, Daily       PRN Meds     senna-docusate sodium **AND** polyethylene glycol **AND** bisacodyl **AND** bisacodyl    Calcium Replacement - Follow Nurse / BPA Driven Protocol    HYDROmorphone    LORazepam **OR** LORazepam **OR** LORazepam **OR** LORazepam **OR** LORazepam **OR** LORazepam    Magnesium Standard Dose Replacement - Follow Nurse / BPA Driven Protocol    melatonin    ondansetron    phenol    Phosphorus Replacement - Follow Nurse / BPA Driven Protocol    Potassium Replacement - Follow Nurse / BPA Driven Protocol    sodium chloride    sodium chloride    sodium chloride    traMADol   Infusions         Diagnostic Data    Results from last 7 days   Lab Units 08/10/24  0605   WBC 10*3/mm3 5.17   HEMOGLOBIN g/dL 9.0*   HEMATOCRIT % 27.9*   PLATELETS 10*3/mm3 71*   GLUCOSE mg/dL 103*   CREATININE mg/dL 0.71   BUN mg/dL 12   SODIUM mmol/L 137   POTASSIUM mmol/L 3.5   AST (SGOT) U/L 102*   ALT (SGPT) U/L 51*   ALK PHOS U/L 150*   BILIRUBIN mg/dL 3.1*   ANION GAP mmol/L 7.0       US Liver    Result Date: 8/9/2024  Impression: 1. Nodular liver contour compatible with underlying cirrhosis. No focal liver lesion by ultrasound. 2. Normal hepatopetal flow within the main portal vein. 3. Nonspecific gallbladder wall thickening, likely reactive to underlying liver disease rather than primary cholecystitis. Electronically Signed: Tirso Zavalaelor  8/9/2024 4:14 PM EDT  Workstation ID: [id number]    CT Abdomen Pelvis With Contrast    Result Date: 8/9/2024  Impression: 1.Small to moderate amount of ascites present throughout the abdomen and pelvis with anasarca of the soft tissues likely related to volume overload or hepatic failure. 2.Surface nodularity of the liver consistent  with cirrhosis. Portal vein is patent. No focal lesions. 3.Diffuse gallbladder wall thickening likely related to underlying liver disease. Acute cholecystitis cannot be excluded though less likely. No evidence of stones or biliary ductal dilatation. 4.Ancillary findings as described above. Electronically Signed: Martine Wright MD  8/9/2024 1:28 AM EDT  Workstation ID: [id number]       I reviewed the patient's new clinical results.    Assessment/Plan:     Active and Resolved Problems  Active Hospital Problems    Diagnosis  POA    **Alcoholic cirrhosis of liver with ascites [K70.31]  Yes    Arrhythmia [I49.9]  Yes    Anasarca [R60.1]  Yes    Thrombocytopenia [D69.6]  Yes    Macrocytic anemia [D53.9]  Yes    Hypotension [I95.9]  Yes    Esophageal varices [I85.00]  Yes    Major depressive disorder, recurrent episode, moderate degree [F33.1]  Yes    Chronic hepatitis C virus infection [B18.2]  Yes    Alcohol abuse [F10.10]  Yes    Tobacco abuse [Z72.0]  Yes      Resolved Hospital Problems   No resolved problems to display.       Abdominal pain  Known cirrhosis of the liver due to alcohol abuse  - Ammonia 125  - Alk phos, albumin, AST, ALT, total bili pending for 8/9/2024  - PT 15.9/INR 1.5  - WBC 3.21  - H&H 8.2/26.4 respectively  - Platelets 63  - Hep C antibody reactive  - CT of abdomen and pelvis showed ascites throughout the abdomen with anasarca of the soft tissues related to hepatic failure.  Cirrhotic changes of the liver and diffuse gallbladder wall thickening likely related to underlying liver disease.  - Dilaudid 1 mg every 3 hours as needed pain ordered  - Ultrasound of liver pending  - GI consulted    Alcohol abuse  - Patient currently resident at [facility] alcohol rehab facility  - Patient states she still continues to drink  - [facility] protocol initiated  - Case management consulted    I seen and evaluated this patient this morning.  Patient states that she has been having increased urination due to Lasix given while  in the emergency department.  Patient's complaining of throat pain for 1 month since colonoscopy completed, patient also with complaints of abdominal pain.  Chloraseptic Spray, as well as Dilaudid every 3 as needed ordered.  Plan is for patient to go for therapeutic paracentesis today.  Will reevaluate patient, as well as labs in the morning.    VTE Prophylaxis:  Mechanical VTE prophylaxis orders are present.         Code status is   Code Status and Medical Interventions: CPR (Attempt to Resuscitate); Full Support   Ordered at: 08/09/24 0239     Code Status (Patient has no pulse and is not breathing):    CPR (Attempt to Resuscitate)     Medical Interventions (Patient has pulse or is breathing):    Full Support       Plan for disposition: Critical access hospital rehab facility 8/11/2024    Time: 30 minutes    Signature: Electronically signed by Danilo Guerra MD, 08/10/24, 10:48 EDT.  [facility] Hospitalist Team      Addendum:    I saw and examined the patient in addition to the ELLIOTT.  I agree with assessment and plan with the following addendum:    General Appearance:  Alert, cooperative, no distress, appears stated age  Head:  Normocephalic, without obvious abnormality, atraumatic  Eyes:  PERRL, conjunctiva/corneas clear, EOM's intact, fundi benign, both eyes  Ears:  Normal TM's and external ear canals, both ears  Nose: Nares normal, septum midline, mucosa normal, no drainage or sinus tenderness  Throat: Lips, mucosa, and tongue normal; teeth and gums normal  Neck: Supple, symmetrical, trachea midline, no adenopathy, thyroid: not enlarged, symmetric, no tenderness/mass/nodules, no carotid bruit or JVD  Lungs:   Clear to auscultation bilaterally, respirations unlabored  Heart: Regular rate and rhythm, S1, S2 normal, no murmur, rub or gallop  Abdomen:  Soft, non-tender, bowel sounds active all four quadrants,  no masses, no organomegaly  Extremities: Extremities normal, atraumatic, no cyanosis or edema  Pulses: 2+ and  symmetric  Skin: Skin color, texture, turgor normal, no rashes or lesions  Neurologic: Normal      Patient underwent paracentesis today with about 1 L of fluid removed.  This was likely the cause of her abdominal pain.  However she would likely have recurrence of this fluid.  I will start her on IV Lasix and transition her to oral Lasix on discharge.  She would also benefit from low-dose spironolactone on discharge.  Continue pain control.      Shay Thornton MD  [facility] Hospitalist Team  08/10/24  14:14 EDT      Electronically signed by Danilo Guerra MD at 08/10/24 1050       Consult Notes (last 48 hours)  Notes from 08/10/24 0955 through 08/12/24 0955   No notes of this type exist for this encounter.

## 2024-08-12 NOTE — PROGRESS NOTES
Referring Provider: Danilo Guerra MD    Reason for follow-up: ?  Atrial fibrillation     Patient Care Team:  Provider, No Known as PCP - General      SUBJECTIVE  Slept well overnight.  Denies any chest pain or shortness of breath.  No A-fib.     ROS  Review of all systems negative except as indicated.    Since I have last seen, the patient has been without any chest discomfort, shortness of breath, palpitations, dizziness or syncope.  Denies having any headache, abdominal pain, nausea, vomiting, diarrhea, constipation, loss of weight or loss of appetite.  Denies having any excessive bruising, hematuria or blood in the stool.        Personal History:    Past Medical History:   Diagnosis Date    Alcohol abuse 11/08/2022    Anemia of chronic disease 07/29/2024    Chronic GERD 08/09/2024    Chronic hepatitis C virus infection 11/08/2022    HAVP Negative 11/03/2022     HEPBSAG Negative 11/03/2022     HEPBCAB Negative 11/03/2022     HEPC10 Positive (A) 11/03/2022             Cirrhosis     Colitis 07/17/2024    Elevated LFTs 11/06/2019    Esophageal varices     Lesion of vocal fold 07/29/2024    Major depressive disorder, recurrent episode, moderate degree 02/17/2024    Neuropathy 08/09/2024    Pelvic congestion syndrome 07/28/2023    Portal vein thrombosis 07/11/2024    Sepsis without acute organ dysfunction 11/03/2022       History reviewed. No pertinent surgical history.    Family History   Family history unknown: Yes       Social History     Tobacco Use    Smoking status: Every Day     Types: Cigarettes    Smokeless tobacco: Current   Vaping Use    Vaping status: Every Day    Substances: Nicotine, THC, CBD, Flavoring    Devices: Disposable   Substance Use Topics    Alcohol use: Yes     Comment: Daily - 1-2 pints a day currently    Drug use: Yes     Types: Marijuana        Home meds:  Prior to Admission medications    Medication Sig Start Date End Date Taking? Authorizing Provider   folic acid (FOLVITE) 1 MG tablet  Take 1 tablet by mouth Daily.  Patient not taking: Reported on 8/9/2024    Varsha Costello MD   furosemide (LASIX) 40 MG tablet Take 1 tablet by mouth Daily.  Patient not taking: Reported on 8/9/2024    Varsha Costello MD   gabapentin (NEURONTIN) 300 MG capsule Take 1 capsule by mouth 3 (Three) Times a Day.  Patient not taking: Reported on 8/9/2024    Varsha Costello MD   HYDROcodone-acetaminophen (NORCO) 5-325 MG per tablet Take 1 tablet by mouth Every 6 (Six) Hours As Needed.  Patient not taking: Reported on 8/9/2024    Varsha Costello MD   hydrOXYzine (ATARAX) 25 MG tablet Take 1 tablet by mouth 3 (Three) Times a Day As Needed for Itching.  Patient not taking: Reported on 8/9/2024    Varsha Costello MD   lactulose (CHRONULAC) 10 GM/15ML solution solution (encephalopathy)  7/22/24   Varsha Costello MD   mirtazapine (REMERON) 30 MG tablet 1 tablet.  Patient not taking: Reported on 8/9/2024 7/30/24   Varsha Costello MD   nicotine polacrilex (NICORETTE) 2 MG gum Take 1 each by mouth Every 2 (Two) Hours As Needed.  Patient not taking: Reported on 8/9/2024 7/30/24 10/28/24  Varsha Costello MD   oxyCODONE (OXY-IR) 5 MG capsule Take 1 capsule by mouth Every 4 (Four) Hours As Needed for Moderate Pain.  Patient not taking: Reported on 8/9/2024    Varsha Costello MD   pantoprazole (PROTONIX) 40 MG EC tablet Take 1 tablet by mouth 2 (Two) Times a Day.  Patient not taking: Reported on 8/9/2024    Varsha Costello MD   propranolol (INDERAL) 10 MG tablet Take 1 tablet by mouth 3 (Three) Times a Day.  Patient not taking: Reported on 8/9/2024    Varsha Costello MD   spironolactone (ALDACTONE) 100 MG tablet Take 1 tablet by mouth Daily.  Patient not taking: Reported on 8/9/2024    Varsha Costello MD       Allergies:  Patient has no known allergies.    Scheduled Meds:albumin human, 37.5 g, Intravenous, Once   Or  albumin human, 50 g, Intravenous, Once    "Or  albumin human, 62.5 g, Intravenous, Once   Or  albumin human, 75 g, Intravenous, Once   Or  albumin human, 87.5 g, Intravenous, Once   Or  albumin human, 100 g, Intravenous, Once   Or  albumin human, 112.5 g, Intravenous, Once  cefTRIAXone, 2,000 mg, Intravenous, Daily  folic acid, 1 mg, Oral, Daily  furosemide, 40 mg, Intravenous, BID  lactulose, 30 g, Oral, TID  lidocaine, 10 mL, Subcutaneous, Once  LORazepam, 1 mg, Oral, Daily  midodrine, 10 mg, Oral, Q8H  rifAXIMin, 550 mg, Oral, Q12H  sodium chloride, 10 mL, Intravenous, Q12H  spironolactone, 100 mg, Oral, Daily  thiamine (B-1) IV, 200 mg, Intravenous, Q8H   Followed by  [START ON 8/14/2024] thiamine, 100 mg, Oral, Daily  ursodiol, 300 mg, Oral, BID  zinc sulfate, 220 mg, Oral, Daily      Continuous Infusions:   PRN Meds:.  senna-docusate sodium **AND** polyethylene glycol **AND** bisacodyl **AND** bisacodyl    Calcium Replacement - Follow Nurse / BPA Driven Protocol    HYDROmorphone    LORazepam **OR** LORazepam **OR** LORazepam **OR** LORazepam **OR** LORazepam **OR** LORazepam    Magnesium Standard Dose Replacement - Follow Nurse / BPA Driven Protocol    melatonin    ondansetron    phenol    Phosphorus Replacement - Follow Nurse / BPA Driven Protocol    Potassium Replacement - Follow Nurse / BPA Driven Protocol    sodium chloride    sodium chloride    sodium chloride    traMADol      OBJECTIVE    Vital Signs  Vitals:    08/11/24 1500 08/11/24 2000 08/11/24 2350 08/12/24 0315   BP:  126/75 117/86 117/67   BP Location:  Left arm Left arm Left arm   Patient Position:  Lying Lying Lying   Pulse:  85 96 87   Resp: 14 13 16 12   Temp: 98.2 °F (36.8 °C) 98.3 °F (36.8 °C) 97.8 °F (36.6 °C) 98.2 °F (36.8 °C)   TempSrc: Oral Oral Oral Oral   SpO2:  96% 95% 91%   Weight:       Height:           Flowsheet Rows      Flowsheet Row First Filed Value   Admission Height 170.2 cm (67\") Documented at 08/08/2024 2109   Admission Weight 93.4 kg (205 lb 14.6 oz) Documented at " 08/08/2024 2109              Intake/Output Summary (Last 24 hours) at 8/12/2024 0706  Last data filed at 8/11/2024 2000  Gross per 24 hour   Intake 960 ml   Output --   Net 960 ml          Telemetry: Sinus rhythm    Physical Exam:  The patient is alert, oriented and in no distress.  Obese  Vital signs as noted above.  Head and neck revealed no carotid bruits or jugular venous distention.  No thyromegaly or lymphadenopathy is present  Lungs clear.  No wheezing.  Breath sounds are normal bilaterally.  Heart normal first and second heart sounds.  No murmur. No precordial rub is present.  No gallop is present.  Abdomen soft and nontender.  No organomegaly is present.  Extremities with good peripheral pulses without any pedal edema.  Skin warm and dry.  Musculoskeletal system is grossly normal.  CNS grossly normal.       Results Review:  I have personally reviewed the results from the time of this admission to 8/12/2024 07:06 EDT and agree with these findings:  []  Laboratory  []  Microbiology  []  Radiology  []  EKG/Telemetry   []  Cardiology/Vascular   []  Pathology  []  Old records  []  Other:    Most notable findings include:    Lab Results (last 24 hours)       Procedure Component Value Units Date/Time    Comprehensive Metabolic Panel [[id number]]  (Abnormal) Collected: 08/12/24 0325    Specimen: Blood from Arm, Left Updated: 08/12/24 0458     Glucose 104 mg/dL      BUN 8 mg/dL      Creatinine 0.76 mg/dL      Sodium 134 mmol/L      Potassium 3.5 mmol/L      Chloride 99 mmol/L      CO2 27.1 mmol/L      Calcium 8.8 mg/dL      Total Protein 6.6 g/dL      Albumin 2.6 g/dL      ALT (SGPT) 54 U/L      AST (SGOT) 104 U/L      Alkaline Phosphatase 149 U/L      Total Bilirubin 3.7 mg/dL      Globulin 4.0 gm/dL      A/G Ratio 0.7 g/dL      BUN/Creatinine Ratio 10.5     Anion Gap 7.9 mmol/L      eGFR 98.6 mL/min/1.73     Narrative:      GFR Normal >60  Chronic Kidney Disease <60  Kidney Failure <15      CBC & Differential  [[id number]]  (Abnormal) Collected: 08/11/24 0617    Specimen: Blood Updated: 08/11/24 0748    Narrative:      The following orders were created for panel order CBC & Differential.  Procedure                               Abnormality         Status                     ---------                               -----------         ------                     CBC Auto Differential[[id number]]        Abnormal            Final result               Scan Slide[[id number]]                                       Final result                 Please view results for these tests on the individual orders.    Scan Slide [[id number]] Collected: 08/11/24 0617    Specimen: Blood Updated: 08/11/24 0748     Scan Slide --     Comment: See Manual Differential Results       Manual Differential [[id number]]  (Abnormal) Collected: 08/11/24 0617    Specimen: Blood Updated: 08/11/24 0748     Neutrophil % 77.0 %      Lymphocyte % 20.0 %      Monocyte % 3.0 %      Neutrophils Absolute 5.20 10*3/mm3      Lymphocytes Absolute 1.35 10*3/mm3      Monocytes Absolute 0.20 10*3/mm3      Anisocytosis Slight/1+     Dacrocytes Slight/1+     Macrocytes Slight/1+     Poikilocytes Slight/1+     WBC Morphology Normal     Platelet Estimate Decreased    CBC Auto Differential [[id number]]  (Abnormal) Collected: 08/11/24 0617    Specimen: Blood Updated: 08/11/24 0748     WBC 6.75 10*3/mm3      RBC 2.78 10*6/mm3      Hemoglobin 9.7 g/dL      Hematocrit 30.3 %      .0 fL      MCH 34.9 pg      MCHC 32.0 g/dL      RDW 14.6 %      RDW-SD 59.2 fl      MPV 11.1 fL      Platelets 89 10*3/mm3     Protime-INR [[id number]]  (Abnormal) Collected: 08/11/24 0617    Specimen: Blood Updated: 08/11/24 0737     Protime 15.8 Seconds      INR 1.49    Comprehensive Metabolic Panel [[id number]]  (Abnormal) Collected: 08/11/24 0617    Specimen: Blood Updated: 08/11/24 0733     Glucose 86 mg/dL      BUN 10 mg/dL      Creatinine 0.72 mg/dL      Sodium 136 mmol/L      Potassium 3.9 mmol/L       Chloride 103 mmol/L      CO2 23.5 mmol/L      Calcium 8.8 mg/dL      Total Protein 6.7 g/dL      Albumin 2.5 g/dL      ALT (SGPT) 54 U/L      AST (SGOT) 107 U/L      Alkaline Phosphatase 158 U/L      Total Bilirubin 3.8 mg/dL      Globulin 4.2 gm/dL      A/G Ratio 0.6 g/dL      BUN/Creatinine Ratio 13.9     Anion Gap 9.5 mmol/L      eGFR 105.2 mL/min/1.73     Narrative:      GFR Normal >60  Chronic Kidney Disease <60  Kidney Failure <15              Imaging Results (Last 24 Hours)       ** No results found for the last 24 hours. **            LAB RESULTS (LAST 7 DAYS)    CBC  Results from last 7 days   Lab Units 08/11/24  0617 08/10/24  0605 08/09/24  0546 08/08/24  2329   WBC 10*3/mm3 6.75 5.17 3.21* 4.43   RBC 10*6/mm3 2.78* 2.56* 2.35* 2.54*   HEMOGLOBIN g/dL 9.7* 9.0* 8.2* 9.2*   HEMATOCRIT % 30.3* 27.9* 26.4* 28.3*   MCV fL 109.0* 109.0* 112.3* 111.4*   PLATELETS 10*3/mm3 89* 71* 63* 74*       BMP  Results from last 7 days   Lab Units 08/12/24  0325 08/11/24  0617 08/10/24  2317 08/10/24  0605 08/09/24  1355 08/09/24  0546 08/08/24  2329   SODIUM mmol/L 134* 136  --  137  --  137 137   POTASSIUM mmol/L 3.5 3.9 4.0 3.5  --  3.9 4.2   CHLORIDE mmol/L 99 103  --  107  --  109* 106   CO2 mmol/L 27.1 23.5  --  23.0  --  20.7* 21.4*   BUN mg/dL 8 10  --  12  --  12 9   CREATININE mg/dL 0.76 0.72  --  0.71  --  0.90 0.86   GLUCOSE mg/dL 104* 86  --  103*  --  97 97   MAGNESIUM mg/dL  --   --   --   --  1.6  --   --        CMP   Results from last 7 days   Lab Units 08/12/24  0325 08/11/24  0617 08/10/24  2317 08/10/24  0605 08/09/24  1355 08/09/24  0546 08/08/24  2329   SODIUM mmol/L 134* 136  --  137  --  137 137   POTASSIUM mmol/L 3.5 3.9 4.0 3.5  --  3.9 4.2   CHLORIDE mmol/L 99 103  --  107  --  109* 106   CO2 mmol/L 27.1 23.5  --  23.0  --  20.7* 21.4*   BUN mg/dL 8 10  --  12  --  12 9   CREATININE mg/dL 0.76 0.72  --  0.71  --  0.90 0.86   GLUCOSE mg/dL 104* 86  --  103*  --  97 97   ALBUMIN g/dL 2.6* 2.5*   --  2.4* 2.6*  --  2.8*   BILIRUBIN mg/dL 3.7* 3.8*  --  3.1* 3.8*  --  3.9*   ALK PHOS U/L 149* 158*  --  150* 131*  --  166*   AST (SGOT) U/L 104* 107*  --  102* 103*  --  121*   ALT (SGPT) U/L 54* 54*  --  51* 53*  --  61*   LIPASE U/L  --   --   --   --   --   --  57   AMMONIA umol/L  --   --   --   --   --  125*  --        BNP        TROPONIN        CoAg  Results from last 7 days   Lab Units 08/11/24  0617 08/09/24  1041   INR  1.49* 1.50*       Creatinine Clearance  Estimated Creatinine Clearance: 112.6 mL/min (by C-G formula based on SCr of 0.76 mg/dL).    ABG        Radiology  No radiology results for the last day      EKG  I personally viewed and interpreted the patient's EKG/Telemetry data:  ECG 12 Lead Rhythm Change   Final Result   HEART RATE=63  bpm   RR Hwsqmdov=0453  ms   AL Ayhhczso=556  ms   P Horizontal Axis=28  deg   P Front Axis=31  deg   QRSD Cjafrxub=151  ms   QT Hruzvkcl=892  ms   AKfO=028  ms   QRS Axis=41  deg   T Wave Axis=43  deg   - OTHERWISE NORMAL ECG -   Sinus bradycardia   Ventricular premature complex   No previous ECG available for comparison   Electronically Signed By: Jayson Corbin ([email or web address]) 2024-08-10 18:10:38   Date and Time of Study:2024-08-09 15:07:55      Telemetry Scan   Final Result      Telemetry Scan   Final Result      Telemetry Scan   Final Result      Telemetry Scan   Final Result      Telemetry Scan   Final Result      Telemetry Scan   Final Result      Telemetry Scan   Final Result      Telemetry Scan   Final Result      Telemetry Scan   Final Result      Telemetry Scan   Final Result      Telemetry Scan   Final Result      Telemetry Scan   Final Result      Telemetry Scan   Final Result      Telemetry Scan   Final Result            Echocardiogram:    Results for orders placed during the hospital encounter of 08/08/24    Adult Transthoracic Echo Complete W/ Cont if Necessary Per Protocol    Interpretation Summary    Left ventricular systolic function is normal. Calculated  left ventricular 3D EF = 61% Left ventricular ejection fraction appears to be 61 - 65%.    Left ventricular diastolic function is consistent with (grade II w/high LAP) pseudonormalization.    Mild to moderate aortic valve stenosis is present. Mean/peak gradient of 17/31 mmHg.  Aortic valve area 1.5 cm²    Estimated right ventricular systolic pressure from tricuspid regurgitation is normal (<35 mmHg).        Stress Test:         Cardiac Catheterization:  No results found for this or any previous visit.         Other:         ASSESSMENT & PLAN:    Principal Problem:    Alcoholic cirrhosis of liver with ascites  Active Problems:    Arrhythmia    Alcohol abuse    Chronic hepatitis C virus infection    Major depressive disorder, recurrent episode, moderate degree    Tobacco abuse    Esophageal varices    Anasarca    Thrombocytopenia    Macrocytic anemia    Hypotension    Alcoholic cirrhosis of liver with ascites and Esophageal varices / Alcohol abuse  Anasarca   History of large volume paracentesis in the past  GI has been consulted for diagnostic and therapeutic paracentesis  Liver ultrasound  Ceftriaxone for SBP  Receiving albumin  Can switch from IV to p.o. Lasix  On lactulose and rifaximin for hyperammonemia      Hypotension  Likely secondary to liver disease and possible sepsis  Currently on midodrine with normal blood pressure  Albumin replacement  Will closely monitor blood pressure     Arrhythmia  Questionable paroxysmal A-fib with slow ventricular rate  Currently in sinus rhythm on telemetry  Echocardiogram with preserved LV function, grade 2 diastolic dysfunction  TSH is normal  Unable to tolerate beta-blocker due to bradycardia  Tachycardic today.  Repeat ECG  Unable to tolerate anticoagulation due to esophageal varices, anemia, thrombocytopenia and high risk of bleeding.     Thrombocytopenia / Macrocytic anemia  Likely secondary to chronic liver disease  Platelets 89 and hemoglobin 9.7 today  INR is  1.49  Monitor CBC  Monitor for signs of bleeding     Chronic hepatitis C virus infection  Unsure if patient has received treatment in the past     Tobacco abuse  Smoking cessation counseling provided to the patient      Jayson Corbin MD  08/12/24  07:06 EDT

## 2024-08-12 NOTE — PLAN OF CARE
Problem: Adult Inpatient Plan of Care  Goal: Plan of Care Review  Outcome: Ongoing, Progressing  Flowsheets (Taken 8/12/2024 1728)  Plan of Care Reviewed With: patient  Goal: Patient-Specific Goal (Individualized)  Outcome: Ongoing, Progressing  Goal: Absence of Hospital-Acquired Illness or Injury  Outcome: Ongoing, Progressing  Intervention: Identify and Manage Fall Risk  Recent Flowsheet Documentation  Taken 8/12/2024 1600 by Mary Angel RN  Safety Promotion/Fall Prevention: safety round/check completed  Taken 8/12/2024 1400 by Mary Angel RN  Safety Promotion/Fall Prevention: safety round/check completed  Taken 8/12/2024 1200 by Mary Angel RN  Safety Promotion/Fall Prevention: safety round/check completed  Taken 8/12/2024 1000 by Mary Angel RN  Safety Promotion/Fall Prevention: safety round/check completed  Taken 8/12/2024 0729 by Mary Angel RN  Safety Promotion/Fall Prevention: safety round/check completed  Intervention: Prevent Skin Injury  Recent Flowsheet Documentation  Taken 8/12/2024 1200 by Mary Angel RN  Body Position: position changed independently  Taken 8/12/2024 0729 by Mary Angel RN  Body Position: position changed independently  Skin Protection: adhesive use limited  Intervention: Prevent and Manage VTE (Venous Thromboembolism) Risk  Recent Flowsheet Documentation  Taken 8/12/2024 0729 by Mary Angel RN  Activity Management: up ad roseanne  VTE Prevention/Management: (up ad roseanne)   patient refused intervention   sequential compression devices off  Goal: Optimal Comfort and Wellbeing  Outcome: Ongoing, Progressing  Intervention: Monitor Pain and Promote Comfort  Recent Flowsheet Documentation  Taken 8/12/2024 1600 by Mary Angel RN  Pain Management Interventions: see MAR  Taken 8/12/2024 1200 by Mary Angel RN  Pain Management Interventions:   diversional activity provided   quiet environment facilitated  Intervention: Provide  Person-Centered Care  Recent Flowsheet Documentation  Taken 8/12/2024 0729 by Mary Angel, RN  Trust Relationship/Rapport:   care explained   thoughts/feelings acknowledged  Goal: Readiness for Transition of Care  Outcome: Ongoing, Progressing   Goal Outcome Evaluation:  Plan of Care Reviewed With: patient   Pt A&Ox4. Anxious and continues with abdominal pain tx with prns. IV antibiotics given. Plan to discharge tomorrow.

## 2024-08-12 NOTE — PROGRESS NOTES
[facility] MEDICINE SERVICE  DAILY PROGRESS NOTE    NAME: Maria Elena Byrnes  : 1980  MRN: [id number]      LOS: 2 days     PROVIDER OF SERVICE: Danilo Guerra MD    Chief Complaint: Alcoholic cirrhosis of liver with ascites    Subjective:     Interval History:  History taken from: patient chart  Patient Complaints: Abdominal pain, sore throat  Patient Denies: Nausea, vomiting    Review of Systems:   Review of Systems   Constitutional:  Positive for appetite change. Negative for activity change, chills and diaphoresis.   HENT: Negative.     Eyes: Negative.    Respiratory: Negative.     Cardiovascular: Negative.    Gastrointestinal:  Positive for abdominal distention and abdominal pain. Negative for anal bleeding, blood in stool, nausea, rectal pain and vomiting.   Endocrine: Negative.    Genitourinary: Negative.    Musculoskeletal: Negative.    Skin: Negative.    Allergic/Immunologic: Negative.    Neurological: Negative.    Hematological: Negative.    Psychiatric/Behavioral: Negative.     8/10  Patient with history of alcohol abuse and chronic hepatitis C  Status post paracentesis yesterday  Started on Lasix  Patient will be discharged on Lasix and Aldactone  Hemoglobin stable at 9  Patient sleepy and weak still with right lower quadrant abdominal pain  Patient seen per cardiology for rhythm control as she has gone to A-fib  Patient has been follow-up also per GI  Continue close monitoring today    Seen per GI  Still with stomach cramps  More awake less encephalopathic  Continue Xifaxan and lactulose  Patient with alcohol induced liver cirrhosis and alcoholic hepatitis with hepatic encephalopathy and esophageal varices and ascites  Patient with pancytopenia due to alcohol  Hepatitis C antibody positive  Continue ceftriaxone and folic acid and lactulose and midodrine and Xifaxan and thiamine and standing started on your ursodiol  Still with generalized abdominal discomfort  Follow-up per GI  recommendation  Seen per cardiology for some chest discomfort     8/12  Patient more awake  Potassium 3.5  Mental status has improved  [facility] for [facility]  She can be discharged back to the facility when bed is available  Management for discharge planning  Temp:  [97.8 °F (36.6 °C)-98.3 °F (36.8 °C)] 98.2 °F (36.8 °C)  Heart Rate:  [85-96] 89  Resp:  [12-17] 17  BP: (117-126)/(62-86) 122/62   Body mass index is 33.99 kg/m².    Physical Exam  Physical Exam  Physical Exam  Vitals and nursing note reviewed.   Constitutional:       Appearance: Normal appearance. She is obese. She is ill-appearing.   HENT:      Mouth/Throat:      Mouth: Mucous membranes are moist.   Cardiovascular:      Rate and Rhythm: Normal rate and regular rhythm.   Pulmonary:      Effort: Pulmonary effort is normal.      Breath sounds: Normal breath sounds.   Abdominal:      General: Abdomen is protuberant. Bowel sounds are normal. There is distension.      Palpations: Abdomen is soft.      Tenderness: There is generalized abdominal tenderness.  Tenderness with movement  Musculoskeletal:         General: Normal range of motion.   Skin:     General: Skin is warm and dry.   Neurological:      General: No focal deficit present.      Mental Status: She is alert and oriented to person, place, and time. Mental status is at baseline.   Psychiatric:         Mood and Affect: Mood normal.         Behavior: Behavior normal.   Scheduled Meds   albumin human, 37.5 g, Intravenous, Once   Or  albumin human, 50 g, Intravenous, Once   Or  albumin human, 62.5 g, Intravenous, Once   Or  albumin human, 75 g, Intravenous, Once   Or  albumin human, 87.5 g, Intravenous, Once   Or  albumin human, 100 g, Intravenous, Once   Or  albumin human, 112.5 g, Intravenous, Once  cefTRIAXone, 2,000 mg, Intravenous, Daily  folic acid, 1 mg, Oral, Daily  furosemide, 40 mg, Intravenous, BID  lactulose, 30 g, Oral, TID  lidocaine, 10 mL,  Subcutaneous, Once  midodrine, 10 mg, Oral, Q8H  potassium chloride, 20 mEq, Oral, Once  rifAXIMin, 550 mg, Oral, Q12H  sodium chloride, 10 mL, Intravenous, Q12H  spironolactone, 100 mg, Oral, Daily  thiamine (B-1) IV, 200 mg, Intravenous, Q8H   Followed by  [START ON 8/14/2024] thiamine, 100 mg, Oral, Daily  ursodiol, 300 mg, Oral, BID  zinc sulfate, 220 mg, Oral, Daily       PRN Meds     senna-docusate sodium **AND** polyethylene glycol **AND** bisacodyl **AND** bisacodyl    Calcium Replacement - Follow Nurse / BPA Driven Protocol    HYDROmorphone    LORazepam **OR** LORazepam **OR** LORazepam **OR** LORazepam **OR** LORazepam **OR** LORazepam    Magnesium Standard Dose Replacement - Follow Nurse / BPA Driven Protocol    melatonin    ondansetron    phenol    Phosphorus Replacement - Follow Nurse / BPA Driven Protocol    Potassium Replacement - Follow Nurse / BPA Driven Protocol    sodium chloride    sodium chloride    sodium chloride    traMADol   Infusions         Diagnostic Data    Results from last 7 days   Lab Units 08/12/24  0325 08/11/24  0617   WBC 10*3/mm3  --  6.75   HEMOGLOBIN g/dL  --  9.7*   HEMATOCRIT %  --  30.3*   PLATELETS 10*3/mm3  --  89*   GLUCOSE mg/dL 104* 86   CREATININE mg/dL 0.76 0.72   BUN mg/dL 8 10   SODIUM mmol/L 134* 136   POTASSIUM mmol/L 3.5 3.9   AST (SGOT) U/L 104* 107*   ALT (SGPT) U/L 54* 54*   ALK PHOS U/L 149* 158*   BILIRUBIN mg/dL 3.7* 3.8*   ANION GAP mmol/L 7.9 9.5       No radiology results for the last day      I reviewed the patient's new clinical results.    Assessment/Plan:     Active and Resolved Problems  Active Hospital Problems    Diagnosis  POA    **Alcoholic cirrhosis of liver with ascites [K70.31]  Yes    Arrhythmia [I49.9]  Yes    Anasarca [R60.1]  Yes    Thrombocytopenia [D69.6]  Yes    Macrocytic anemia [D53.9]  Yes    Hypotension [I95.9]  Yes    Esophageal varices [I85.00]  Yes    Major depressive disorder, recurrent episode, moderate degree [F33.1]  Yes     Chronic hepatitis C virus infection [B18.2]  Yes    Alcohol abuse [F10.10]  Yes    Tobacco abuse [Z72.0]  Yes      Resolved Hospital Problems   No resolved problems to display.       Abdominal pain  Known cirrhosis of the liver due to alcohol abuse  - Ammonia 125  - Alk phos, albumin, AST, ALT, total bili pending for 8/9/2024  - PT 15.9/INR 1.5  - WBC 3.21  - H&H 8.2/26.4 respectively  - Platelets 63  - Hep C antibody reactive  - CT of abdomen and pelvis showed ascites throughout the abdomen with anasarca of the soft tissues related to hepatic failure.  Cirrhotic changes of the liver and diffuse gallbladder wall thickening likely related to underlying liver disease.  - Dilaudid 1 mg every 3 hours as needed pain ordered  - Ultrasound of liver pending  - GI consulted    Alcohol abuse  - Patient currently resident at [facility]  - Patient states she still continues to drink  - [facility] protocol initiated  - Case management consulted    I seen and evaluated this patient this morning.  Patient states that she has been having increased urination due to Lasix given while in the emergency department.  Patient's complaining of throat pain for 1 month since colonoscopy completed, patient also with complaints of abdominal pain.  Chloraseptic Spray, as well as Dilaudid every 3 as needed ordered.  Plan is for patient to go for therapeutic paracentesis today.  Will reevaluate patient, as well as labs in the morning.    VTE Prophylaxis:  Mechanical VTE prophylaxis orders are present.         Code status is   Code Status and Medical Interventions: CPR (Attempt to Resuscitate); Full Support   Ordered at: 08/09/24 0239     Code Status (Patient has no pulse and is not breathing):    CPR (Attempt to Resuscitate)     Medical Interventions (Patient has pulse or is breathing):    Full Support       Plan for disposition: [facility] 8/11/2024    Time: 30 minutes    Signature: Electronically signed by Danilo BARRIOS  MD Charlie, 08/12/24, 09:14 EDT.  [facility] Hospitalist Team      Addendum:    I saw and examined the patient in addition to the ELLIOTT.  I agree with assessment and plan with the following addendum:    General Appearance:  Alert, cooperative, no distress, appears stated age  Head:  Normocephalic, without obvious abnormality, atraumatic  Eyes:  PERRL, conjunctiva/corneas clear, EOM's intact, fundi benign, both eyes  Ears:  Normal TM's and external ear canals, both ears  Nose: Nares normal, septum midline, mucosa normal, no drainage or sinus tenderness  Throat: Lips, mucosa, and tongue normal; teeth and gums normal  Neck: Supple, symmetrical, trachea midline, no adenopathy, thyroid: not enlarged, symmetric, no tenderness/mass/nodules, no carotid bruit or JVD  Lungs:   Clear to auscultation bilaterally, respirations unlabored  Heart: Regular rate and rhythm, S1, S2 normal, no murmur, rub or gallop  Abdomen:  Soft, non-tender, bowel sounds active all four quadrants,  no masses, no organomegaly  Extremities: Extremities normal, atraumatic, no cyanosis or edema  Pulses: 2+ and symmetric  Skin: Skin color, texture, turgor normal, no rashes or lesions  Neurologic: Normal      Patient underwent paracentesis today with about 1 L of fluid removed.  This was likely the cause of her abdominal pain.  However she would likely have recurrence of this fluid.  I will start her on IV Lasix and transition her to oral Lasix on discharge.  She would also benefit from low-dose spironolactone on discharge.  Continue pain control.

## 2024-08-13 VITALS
TEMPERATURE: 97.9 F | RESPIRATION RATE: 10 BRPM | HEIGHT: 67 IN | DIASTOLIC BLOOD PRESSURE: 78 MMHG | WEIGHT: 217 LBS | BODY MASS INDEX: 34.06 KG/M2 | OXYGEN SATURATION: 95 % | HEART RATE: 78 BPM | SYSTOLIC BLOOD PRESSURE: 118 MMHG

## 2024-08-13 PROCEDURE — 99232 SBSQ HOSP IP/OBS MODERATE 35: CPT | Performed by: INTERNAL MEDICINE

## 2024-08-13 PROCEDURE — 25010000002 THIAMINE PER 100 MG

## 2024-08-13 PROCEDURE — 25010000002 FUROSEMIDE PER 20 MG: Performed by: INTERNAL MEDICINE

## 2024-08-13 PROCEDURE — 25010000002 CEFTRIAXONE PER 250 MG: Performed by: NURSE PRACTITIONER

## 2024-08-13 RX ORDER — LACTULOSE 10 G/15ML
30 SOLUTION ORAL 2 TIMES DAILY
Qty: 300 ML | Refills: 0 | Status: SHIPPED | OUTPATIENT
Start: 2024-08-13

## 2024-08-13 RX ORDER — SPIRONOLACTONE 100 MG/1
100 TABLET, FILM COATED ORAL DAILY
Qty: 60 TABLET | Refills: 0 | Status: SHIPPED | OUTPATIENT
Start: 2024-08-14 | End: 2024-08-18 | Stop reason: HOSPADM

## 2024-08-13 RX ORDER — MIDODRINE HYDROCHLORIDE 10 MG/1
10 TABLET ORAL EVERY 8 HOURS
Qty: 60 TABLET | Refills: 0 | Status: SHIPPED | OUTPATIENT
Start: 2024-08-13

## 2024-08-13 RX ORDER — MIDODRINE HYDROCHLORIDE 5 MG/1
10 TABLET ORAL EVERY 8 HOURS
Status: DISCONTINUED | OUTPATIENT
Start: 2024-08-13 | End: 2024-08-13 | Stop reason: HOSPADM

## 2024-08-13 RX ORDER — FOLIC ACID 1 MG/1
1 TABLET ORAL DAILY
Qty: 30 TABLET | Refills: 0 | Status: SHIPPED | OUTPATIENT
Start: 2024-08-14

## 2024-08-13 RX ORDER — URSODIOL 300 MG/1
300 CAPSULE ORAL 2 TIMES DAILY
Qty: 60 CAPSULE | Refills: 0 | Status: SHIPPED | OUTPATIENT
Start: 2024-08-13

## 2024-08-13 RX ORDER — GAUZE BANDAGE 2" X 2"
100 BANDAGE TOPICAL DAILY
Qty: 30 TABLET | Refills: 0 | Status: SHIPPED | OUTPATIENT
Start: 2024-08-14

## 2024-08-13 RX ADMIN — LORAZEPAM 2 MG: 1 TABLET ORAL at 08:26

## 2024-08-13 RX ADMIN — OXYCODONE HYDROCHLORIDE 5 MG: 5 TABLET ORAL at 04:45

## 2024-08-13 RX ADMIN — MIDODRINE HYDROCHLORIDE 10 MG: 5 TABLET ORAL at 04:45

## 2024-08-13 RX ADMIN — URSODIOL 300 MG: 300 CAPSULE ORAL at 08:26

## 2024-08-13 RX ADMIN — LACTULOSE 30 G: 10 SOLUTION ORAL at 08:25

## 2024-08-13 RX ADMIN — FUROSEMIDE 40 MG: 10 INJECTION, SOLUTION INTRAMUSCULAR; INTRAVENOUS at 08:26

## 2024-08-13 RX ADMIN — RIFAXIMIN 550 MG: 550 TABLET ORAL at 08:26

## 2024-08-13 RX ADMIN — THIAMINE HYDROCHLORIDE 200 MG: 100 INJECTION, SOLUTION INTRAMUSCULAR; INTRAVENOUS at 04:44

## 2024-08-13 RX ADMIN — CEFTRIAXONE 2000 MG: 2 INJECTION, POWDER, FOR SOLUTION INTRAMUSCULAR; INTRAVENOUS at 08:25

## 2024-08-13 RX ADMIN — LORAZEPAM 2 MG: 1 TABLET ORAL at 04:45

## 2024-08-13 RX ADMIN — ZINC SULFATE 220 MG (50 MG) CAPSULE 220 MG: CAPSULE at 08:26

## 2024-08-13 RX ADMIN — SPIRONOLACTONE 100 MG: 25 TABLET ORAL at 08:27

## 2024-08-13 RX ADMIN — Medication 10 ML: at 08:27

## 2024-08-13 RX ADMIN — OXYCODONE HYDROCHLORIDE 5 MG: 5 TABLET ORAL at 10:45

## 2024-08-13 RX ADMIN — FOLIC ACID 1 MG: 1 TABLET ORAL at 08:26

## 2024-08-13 NOTE — PROGRESS NOTES
Referring Provider: Danilo Guerra MD    Reason for follow-up: ?  Atrial fibrillation     Patient Care Team:  Provider, No Known as PCP - General      SUBJECTIVE  Resting comfortably in bed.  Denies any chest pain or shortness of breath.  Remains in sinus rhythm     ROS  Review of all systems negative except as indicated.    Since I have last seen, the patient has been without any chest discomfort, shortness of breath, palpitations, dizziness or syncope.  Denies having any headache, abdominal pain, nausea, vomiting, diarrhea, constipation, loss of weight or loss of appetite.  Denies having any excessive bruising, hematuria or blood in the stool.        Personal History:    Past Medical History:   Diagnosis Date    Alcohol abuse 11/08/2022    Anemia of chronic disease 07/29/2024    Chronic GERD 08/09/2024    Chronic hepatitis C virus infection 11/08/2022    HAVP Negative 11/03/2022     HEPBSAG Negative 11/03/2022     HEPBCAB Negative 11/03/2022     HEPC10 Positive (A) 11/03/2022             Cirrhosis     Colitis 07/17/2024    Elevated LFTs 11/06/2019    Esophageal varices     Lesion of vocal fold 07/29/2024    Major depressive disorder, recurrent episode, moderate degree 02/17/2024    Neuropathy 08/09/2024    Pelvic congestion syndrome 07/28/2023    Portal vein thrombosis 07/11/2024    Sepsis without acute organ dysfunction 11/03/2022       History reviewed. No pertinent surgical history.    Family History   Family history unknown: Yes       Social History     Tobacco Use    Smoking status: Every Day     Types: Cigarettes    Smokeless tobacco: Current   Vaping Use    Vaping status: Every Day    Substances: Nicotine, THC, CBD, Flavoring    Devices: Disposable   Substance Use Topics    Alcohol use: Yes     Comment: Daily - 1-2 pints a day currently    Drug use: Yes     Types: Marijuana        Home meds:  Prior to Admission medications    Medication Sig Start Date End Date Taking? Authorizing Provider   folic acid  (FOLVITE) 1 MG tablet Take 1 tablet by mouth Daily.  Patient not taking: Reported on 8/9/2024    Varsha Costello MD   furosemide (LASIX) 40 MG tablet Take 1 tablet by mouth Daily.  Patient not taking: Reported on 8/9/2024    Varsha Costello MD   gabapentin (NEURONTIN) 300 MG capsule Take 1 capsule by mouth 3 (Three) Times a Day.  Patient not taking: Reported on 8/9/2024    Varsha Costello MD   HYDROcodone-acetaminophen (NORCO) 5-325 MG per tablet Take 1 tablet by mouth Every 6 (Six) Hours As Needed.  Patient not taking: Reported on 8/9/2024    Varsha Costello MD   hydrOXYzine (ATARAX) 25 MG tablet Take 1 tablet by mouth 3 (Three) Times a Day As Needed for Itching.  Patient not taking: Reported on 8/9/2024    Varsha Costello MD   lactulose (CHRONULAC) 10 GM/15ML solution solution (encephalopathy)  7/22/24   Varsha Costello MD   mirtazapine (REMERON) 30 MG tablet 1 tablet.  Patient not taking: Reported on 8/9/2024 7/30/24   Varsha Costello MD   nicotine polacrilex (NICORETTE) 2 MG gum Take 1 each by mouth Every 2 (Two) Hours As Needed.  Patient not taking: Reported on 8/9/2024 7/30/24 10/28/24  Varsha Costello MD   oxyCODONE (OXY-IR) 5 MG capsule Take 1 capsule by mouth Every 4 (Four) Hours As Needed for Moderate Pain.  Patient not taking: Reported on 8/9/2024    Varsha Costello MD   pantoprazole (PROTONIX) 40 MG EC tablet Take 1 tablet by mouth 2 (Two) Times a Day.  Patient not taking: Reported on 8/9/2024    Varsha Costello MD   propranolol (INDERAL) 10 MG tablet Take 1 tablet by mouth 3 (Three) Times a Day.  Patient not taking: Reported on 8/9/2024    Varsha Costello MD   spironolactone (ALDACTONE) 100 MG tablet Take 1 tablet by mouth Daily.  Patient not taking: Reported on 8/9/2024    Varsha Costello MD       Allergies:  Patient has no known allergies.    Scheduled Meds:cefTRIAXone, 2,000 mg, Intravenous, Daily  folic acid, 1 mg, Oral,  "Daily  furosemide, 40 mg, Intravenous, BID  lactulose, 30 g, Oral, TID  lidocaine, 10 mL, Subcutaneous, Once  midodrine, 10 mg, Oral, Q8H  rifAXIMin, 550 mg, Oral, Q12H  sodium chloride, 10 mL, Intravenous, Q12H  spironolactone, 100 mg, Oral, Daily  thiamine (B-1) IV, 200 mg, Intravenous, Q8H   Followed by  [START ON 8/14/2024] thiamine, 100 mg, Oral, Daily  ursodiol, 300 mg, Oral, BID  zinc sulfate, 220 mg, Oral, Daily      Continuous Infusions:   PRN Meds:.  senna-docusate sodium **AND** polyethylene glycol **AND** bisacodyl **AND** bisacodyl    Calcium Replacement - Follow Nurse / BPA Driven Protocol    HYDROmorphone    LORazepam **OR** LORazepam **OR** LORazepam **OR** LORazepam **OR** LORazepam **OR** LORazepam    Magnesium Standard Dose Replacement - Follow Nurse / BPA Driven Protocol    melatonin    ondansetron    oxyCODONE    phenol    Phosphorus Replacement - Follow Nurse / BPA Driven Protocol    Potassium Replacement - Follow Nurse / BPA Driven Protocol    sodium chloride    sodium chloride    sodium chloride    traMADol      OBJECTIVE    Vital Signs  Vitals:    08/12/24 1955 08/12/24 2357 08/13/24 0354 08/13/24 0700   BP: 126/82  112/78 100/63   BP Location:       Patient Position:       Pulse: 87  93 78   Resp: 12 12 16 10   Temp: 98.2 °F (36.8 °C) 98.1 °F (36.7 °C) 98.2 °F (36.8 °C) 97.7 °F (36.5 °C)   TempSrc: Oral Oral Oral Oral   SpO2: 100%  95% 95%   Weight:       Height:           Flowsheet Rows      Flowsheet Row First Filed Value   Admission Height 170.2 cm (67\") Documented at 08/08/2024 2109   Admission Weight 93.4 kg (205 lb 14.6 oz) Documented at 08/08/2024 2109              Intake/Output Summary (Last 24 hours) at 8/13/2024 0818  Last data filed at 8/12/2024 1524  Gross per 24 hour   Intake 480 ml   Output --   Net 480 ml          Telemetry: Sinus rhythm    Physical Exam:  The patient is alert, oriented and in no distress.  Obese  Vital signs as noted above.  Head and neck revealed no carotid " bruits or jugular venous distention.  No thyromegaly or lymphadenopathy is present  Lungs clear.  No wheezing.  Breath sounds are normal bilaterally.  Heart normal first and second heart sounds.  No murmur. No precordial rub is present.  No gallop is present.  Abdomen soft and nontender.  No organomegaly is present.  Extremities with good peripheral pulses without any pedal edema.  Skin warm and dry.  Musculoskeletal system is grossly normal.  CNS grossly normal.       Results Review:  I have personally reviewed the results from the time of this admission to 8/13/2024 08:18 EDT and agree with these findings:  []  Laboratory  []  Microbiology  []  Radiology  []  EKG/Telemetry   []  Cardiology/Vascular   []  Pathology  []  Old records  []  Other:    Most notable findings include:    Lab Results (last 24 hours)       Procedure Component Value Units Date/Time    Body Fluid Culture - Body Fluid, Peritoneum [[id number]] Collected: 08/09/24 1225    Specimen: Body Fluid from Peritoneum Updated: 08/13/24 0701     Body Fluid Culture No growth at 4 days     Gram Stain Rare (1+) WBCs seen      No organisms seen    Hepatitis C RNA, Quantitative, PCR (graph) [[id number]] Collected: 08/09/24 2114    Specimen: Blood from Arm, Right Updated: 08/12/24 1410     Hepatitis C Quantitation [id number] IU/mL      HCV log10 6.352 log10 IU/mL      Test Information Comment     Comment: The quantitative range of this assay is 15 IU/mL to 100 million IU/mL.       Narrative:      [facility] at[facility]  [facility]  [address], [place]  [id number]  : Brody Ariza MD, Phone:  [phone]  [facility]:  [facility] - [facility]  [address], [place]  [id number]  : Lizzie Manrique [facility], Phone:  [phone]    Non-gynecologic Cytology [[id number]] Collected: 08/09/24 1225    Specimen: Peritoneal Fluid from Peritoneum Updated: 08/12/24 1107     Case Report --     Medical Cytology Report                           Case:  "[id number]                                  Authorizing Provider:  Dougherty FarrahMARC Mays      Collected:           08/09/2024 12:25 PM          Ordering Location:     [facility]       Received:            08/09/2024 01:09 PM                                 EMERGENCY DEPARTMENT                                                         Pathologist:           Zechariah Mendes MD                                                            Specimen:    Peritoneum                                                                                  Final Diagnosis --     Peritoneal fluid with smears and cytospin:  Chronic inflammation, macrophages and reactive mesothelial cells  No malignancy identified    LUIZ       Gross Description --     1. Peritoneum.  Received in carbowax and designated \"peritoneal fluid\" are 15 mL of hazy,green fluid. Particulate matter is nor present. This specimen is processed as per protocol.                  Imaging Results (Last 24 Hours)       Procedure Component Value Units Date/Time    Therapeutic Paracentesis With Radiology [[id number]] Collected: 08/12/24 0822     Updated: 08/12/24 1119    Narrative:      PARACENTESIS WITH RADIOLOGY    Date of Exam: 8/9/2024 11:29 AM EDT    Indication: Ascites.    Comparison: None available.    Technique/Findings:  The patient's medications were reviewed prior to the procedure. The procedure, risks and alternatives were discussed and informed written consent was obtained. Limited ultrasound of the abdomen was performed and a site was chosen. The skin was marked,   prepped and draped. Maximal sterile barrier technique was utilized throughout the procedure. Xylocaine was injected into the site. A 6 Hungarian Pigtail Catheter was inserted by the Trochar technique with 1200 mL of fluid removed. The 6 Hungarian catheter was   removed and a sterile dressing applied to the site.      Impression:      Impression:  Technically successful US Guided Paracentesis. Please see " Ambulatory nursing notes for intra-procedure vitals and information.      Report dictated by: Jb Domingo DNP      I have personally reviewed this case and agree with the findings above:    Electronically Signed: Anthony Mei MD    8/12/2024 11:17 AM EDT    Workstation ID: [id number]            LAB RESULTS (LAST 7 DAYS)    CBC  Results from last 7 days   Lab Units 08/11/24  0617 08/10/24  0605 08/09/24  0546 08/08/24  2329   WBC 10*3/mm3 6.75 5.17 3.21* 4.43   RBC 10*6/mm3 2.78* 2.56* 2.35* 2.54*   HEMOGLOBIN g/dL 9.7* 9.0* 8.2* 9.2*   HEMATOCRIT % 30.3* 27.9* 26.4* 28.3*   MCV fL 109.0* 109.0* 112.3* 111.4*   PLATELETS 10*3/mm3 89* 71* 63* 74*       BMP  Results from last 7 days   Lab Units 08/12/24  0325 08/11/24  0617 08/10/24  2317 08/10/24  0605 08/09/24  1355 08/09/24 0546 08/08/24  2329   SODIUM mmol/L 134* 136  --  137  --  137 137   POTASSIUM mmol/L 3.5 3.9 4.0 3.5  --  3.9 4.2   CHLORIDE mmol/L 99 103  --  107  --  109* 106   CO2 mmol/L 27.1 23.5  --  23.0  --  20.7* 21.4*   BUN mg/dL 8 10  --  12  --  12 9   CREATININE mg/dL 0.76 0.72  --  0.71  --  0.90 0.86   GLUCOSE mg/dL 104* 86  --  103*  --  97 97   MAGNESIUM mg/dL  --   --   --   --  1.6  --   --        CMP   Results from last 7 days   Lab Units 08/12/24  0325 08/11/24  0617 08/10/24  2317 08/10/24  0605 08/09/24  1355 08/09/24  0546 08/08/24  2329   SODIUM mmol/L 134* 136  --  137  --  137 137   POTASSIUM mmol/L 3.5 3.9 4.0 3.5  --  3.9 4.2   CHLORIDE mmol/L 99 103  --  107  --  109* 106   CO2 mmol/L 27.1 23.5  --  23.0  --  20.7* 21.4*   BUN mg/dL 8 10  --  12  --  12 9   CREATININE mg/dL 0.76 0.72  --  0.71  --  0.90 0.86   GLUCOSE mg/dL 104* 86  --  103*  --  97 97   ALBUMIN g/dL 2.6* 2.5*  --  2.4* 2.6*  --  2.8*   BILIRUBIN mg/dL 3.7* 3.8*  --  3.1* 3.8*  --  3.9*   ALK PHOS U/L 149* 158*  --  150* 131*  --  166*   AST (SGOT) U/L 104* 107*  --  102* 103*  --  121*   ALT (SGPT) U/L 54* 54*  --  51* 53*  --  61*   LIPASE U/L  --   --   --   --    --   --  57   AMMONIA umol/L  --   --   --   --   --  125*  --        BNP        TROPONIN        CoAg  Results from last 7 days   Lab Units 08/11/24  0617 08/09/24  1041   INR  1.49* 1.50*       Creatinine Clearance  Estimated Creatinine Clearance: 112.6 mL/min (by C-G formula based on SCr of 0.76 mg/dL).    ABG        Radiology  No radiology results for the last day      EKG  I personally viewed and interpreted the patient's EKG/Telemetry data:  ECG 12 Lead Rhythm Change   Preliminary Result   HEART RATE=72  bpm   RR Dyebpavj=283  ms   CT Qfnxihhb=547  ms   P Horizontal Axis=107  deg   P Front Axis=-61  deg   QRSD Interval=97  ms   QT Piasfjwt=527  ms   GRjG=956  ms   QRS Axis=-2  deg   T Wave Axis=0  deg   - OTHERWISE NORMAL ECG -   Sinus or ectopic atrial rhythm   Date and Time of Study:2024-08-12 15:49:17      ECG 12 Lead Rhythm Change   Final Result   HEART RATE=63  bpm   RR Zhdgpcdb=9747  ms   CT Upptubod=936  ms   P Horizontal Axis=28  deg   P Front Axis=31  deg   QRSD Fyakthkk=134  ms   QT Nllnqsbr=339  ms   GRrN=086  ms   QRS Axis=41  deg   T Wave Axis=43  deg   - OTHERWISE NORMAL ECG -   Sinus bradycardia   Ventricular premature complex   No previous ECG available for comparison   Electronically Signed By: Jayson Corbin ([email or web address]) 2024-08-10 18:10:38   Date and Time of Study:2024-08-09 15:07:55      Telemetry Scan   Final Result      Telemetry Scan   Final Result      Telemetry Scan   Final Result      Telemetry Scan   Final Result      Telemetry Scan   Final Result      Telemetry Scan   Final Result      Telemetry Scan   Final Result      Telemetry Scan   Final Result      Telemetry Scan   Final Result      Telemetry Scan   Final Result      Telemetry Scan   Final Result      Telemetry Scan   Final Result      Telemetry Scan   Final Result      Telemetry Scan   Final Result      Telemetry Scan   Final Result      Telemetry Scan   Final Result      Telemetry Scan   Final Result      Telemetry Scan   Final Result             Echocardiogram:    Results for orders placed during the hospital encounter of 08/08/24    Adult Transthoracic Echo Complete W/ Cont if Necessary Per Protocol    Interpretation Summary    Left ventricular systolic function is normal. Calculated left ventricular 3D EF = 61% Left ventricular ejection fraction appears to be 61 - 65%.    Left ventricular diastolic function is consistent with (grade II w/high LAP) pseudonormalization.    Mild to moderate aortic valve stenosis is present. Mean/peak gradient of 17/31 mmHg.  Aortic valve area 1.5 cm²    Estimated right ventricular systolic pressure from tricuspid regurgitation is normal (<35 mmHg).        Stress Test:         Cardiac Catheterization:  No results found for this or any previous visit.         Other:         ASSESSMENT & PLAN:    Principal Problem:    Alcoholic cirrhosis of liver with ascites  Active Problems:    Arrhythmia    Alcohol abuse    Chronic hepatitis C virus infection    Major depressive disorder, recurrent episode, moderate degree    Tobacco abuse    Esophageal varices    Anasarca    Thrombocytopenia    Macrocytic anemia    Hypotension    Alcoholic cirrhosis of liver with ascites and Esophageal varices / Alcohol abuse  Anasarca   History of large volume paracentesis in the past  GI has been consulted for diagnostic and therapeutic paracentesis  Liver ultrasound  Ceftriaxone for SBP  Albumin and Lasix  On lactulose and rifaximin for hyperammonemia      Hypotension  Likely secondary to liver disease and possible sepsis  Currently on midodrine with normal blood pressure  Albumin replacement  Will closely monitor blood pressure     Arrhythmia  Questionable paroxysmal A-fib with slow ventricular rate  Currently in sinus rhythm on telemetry  Echocardiogram with preserved LV function, grade 2 diastolic dysfunction  TSH is normal  Unable to tolerate beta-blocker due to bradycardia  Tachycardic today.  Repeat ECG  Unable to tolerate  anticoagulation due to esophageal varices, anemia, thrombocytopenia and high risk of bleeding.     Thrombocytopenia / Macrocytic anemia  Likely secondary to chronic liver disease  Platelets 89 and hemoglobin 9.7 today  INR is 1.49  Monitor CBC  Monitor for signs of bleeding     Chronic hepatitis C virus infection  Unsure if patient has received treatment in the past     Tobacco abuse  Smoking cessation counseling provided to the patient      Jayson Corbin MD  08/13/24  08:18 EDT

## 2024-08-13 NOTE — DISCHARGE SUMMARY
[facility] MEDICINE SERVICE  Dc summary     NAME: Maria Elena Byrnes  : 1980  MRN: [id number]       LOS: 2 days      PROVIDER OF SERVICE: Danilo Guerra MD     Chief Complaint: Alcoholic cirrhosis of liver with ascites     Subjective:      Interval History:  History taken from: patient chart  Patient Complaints: Abdominal pain, sore throat  Patient Denies: Nausea, vomiting     Review of Systems:   Review of Systems   Constitutional:  Positive for appetite change. Negative for activity change, chills and diaphoresis.   HENT: Negative.     Eyes: Negative.    Respiratory: Negative.     Cardiovascular: Negative.    Gastrointestinal:  Positive for abdominal distention and abdominal pain. Negative for anal bleeding, blood in stool, nausea, rectal pain and vomiting.   Endocrine: Negative.    Genitourinary: Negative.    Musculoskeletal: Negative.    Skin: Negative.    Allergic/Immunologic: Negative.    Neurological: Negative.    Hematological: Negative.    Psychiatric/Behavioral: Negative.     8/10  Patient with history of alcohol abuse and chronic hepatitis C  Status post paracentesis yesterday  Started on Lasix  Patient will be discharged on Lasix and Aldactone  Hemoglobin stable at 9  Patient sleepy and weak still with right lower quadrant abdominal pain  Patient seen per cardiology for rhythm control as she has gone to A-fib  Patient has been follow-up also per GI  Continue close monitoring today    Seen per GI  Still with stomach cramps  More awake less encephalopathic  Continue Xifaxan and lactulose  Patient with alcohol induced liver cirrhosis and alcoholic hepatitis with hepatic encephalopathy and esophageal varices and ascites  Patient with pancytopenia due to alcohol  Hepatitis C antibody positive  Continue ceftriaxone and folic acid and lactulose and midodrine and Xifaxan and thiamine and standing started on your ursodiol  Still with generalized abdominal discomfort  Follow-up per GI  recommendation  Seen per cardiology for some chest discomfort      8/12  Patient more awake  Potassium 3.5  Mental status has improved  [facility] for [facility]  She can be discharged back to the facility when bed is available  Management for discharge planning    8/13  Awake alert oriented  Going back to the rehab facility that [facility]  Resume her home meds on discharge  Resume patient on low-protein low-sodium diet with Ensure before bedtime  Continue Xifaxan and lactulose  Continue Aldactone  Patient with anemia of chronic disease with thrombocytopenia due to liver cirrhosis and alcohol liver disease  Hepatitis C  Hepatic encephalopathy has improved clinically  For discharge 35 minutes  Temp:  [97.8 °F (36.6 °C)-98.3 °F (36.8 °C)] 98.2 °F (36.8 °C)  Heart Rate:  [85-96] 89  Resp:  [12-17] 17  BP: (117-126)/(62-86) 122/62   Body mass index is 33.99 kg/m².     Physical Exam  Physical Exam  Physical Exam  Vitals and nursing note reviewed.   Constitutional:       Appearance: Normal appearance. She is obese. She is ill-appearing.   HENT:      Mouth/Throat:      Mouth: Mucous membranes are moist.   Cardiovascular:      Rate and Rhythm: Normal rate and regular rhythm.   Pulmonary:      Effort: Pulmonary effort is normal.      Breath sounds: Normal breath sounds.   Abdominal:      General: Abdomen is protuberant. Bowel sounds are normal. There is distension.      Palpations: Abdomen is soft.      Tenderness: There is generalized abdominal tenderness.  Tenderness with movement  Musculoskeletal:         General: Normal range of motion.   Skin:     General: Skin is warm and dry.   Neurological:      General: No focal deficit present.      Mental Status: She is alert and oriented to person, place, and time. Mental status is at baseline.   Psychiatric:         Mood and Affect: Mood normal.         Behavior: Behavior normal.   Scheduled Meds     Scheduled Medication    albumin human, 37.5 g, Intravenous, Once   Or  albumin human, 50 g, Intravenous, Once   Or  albumin human, 62.5 g, Intravenous, Once   Or  albumin human, 75 g, Intravenous, Once   Or  albumin human, 87.5 g, Intravenous, Once   Or  albumin human, 100 g, Intravenous, Once   Or  albumin human, 112.5 g, Intravenous, Once  cefTRIAXone, 2,000 mg, Intravenous, Daily  folic acid, 1 mg, Oral, Daily  furosemide, 40 mg, Intravenous, BID  lactulose, 30 g, Oral, TID  lidocaine, 10 mL, Subcutaneous, Once  midodrine, 10 mg, Oral, Q8H  potassium chloride, 20 mEq, Oral, Once  rifAXIMin, 550 mg, Oral, Q12H  sodium chloride, 10 mL, Intravenous, Q12H  spironolactone, 100 mg, Oral, Daily  thiamine (B-1) IV, 200 mg, Intravenous, Q8H   Followed by  [START ON 8/14/2024] thiamine, 100 mg, Oral, Daily  ursodiol, 300 mg, Oral, BID  zinc sulfate, 220 mg, Oral, Daily         PRN Meds     PRN Medication     senna-docusate sodium **AND** polyethylene glycol **AND** bisacodyl **AND** bisacodyl    Calcium Replacement - Follow Nurse / BPA Driven Protocol    HYDROmorphone    LORazepam **OR** LORazepam **OR** LORazepam **OR** LORazepam **OR** LORazepam **OR** LORazepam    Magnesium Standard Dose Replacement - Follow Nurse / BPA Driven Protocol    melatonin    ondansetron    phenol    Phosphorus Replacement - Follow Nurse / BPA Driven Protocol    Potassium Replacement - Follow Nurse / BPA Driven Protocol    sodium chloride    sodium chloride    sodium chloride    traMADol      Infusions  Infusion Medications                       Diagnostic Data          Results from last 7 days   Lab Units 08/12/24  0325 08/11/24  0617   WBC 10*3/mm3  --  6.75   HEMOGLOBIN g/dL  --  9.7*   HEMATOCRIT %  --  30.3*   PLATELETS 10*3/mm3  --  89*   GLUCOSE mg/dL 104* 86   CREATININE mg/dL 0.76 0.72   BUN mg/dL 8 10   SODIUM mmol/L 134* 136   POTASSIUM mmol/L 3.5 3.9   AST (SGOT) U/L 104* 107*   ALT (SGPT) U/L 54* 54*   ALK PHOS U/L 149* 158*   BILIRUBIN mg/dL 3.7* 3.8*    ANION GAP mmol/L 7.9 9.5         No radiology results for the last day        I reviewed the patient's new clinical results.     Assessment/Plan:      Active and Resolved Problems        Active Hospital Problems     Diagnosis   POA    **Alcoholic cirrhosis of liver with ascites [K70.31]   Yes    Arrhythmia [I49.9]   Yes    Anasarca [R60.1]   Yes    Thrombocytopenia [D69.6]   Yes    Macrocytic anemia [D53.9]   Yes    Hypotension [I95.9]   Yes    Esophageal varices [I85.00]   Yes    Major depressive disorder, recurrent episode, moderate degree [F33.1]   Yes    Chronic hepatitis C virus infection [B18.2]   Yes    Alcohol abuse [F10.10]   Yes    Tobacco abuse [Z72.0]   Yes       Resolved Hospital Problems   No resolved problems to display.         Abdominal pain  Known cirrhosis of the liver due to alcohol abuse  - Ammonia 125  - Alk phos, albumin, AST, ALT, total bili pending for 8/9/2024  - PT 15.9/INR 1.5  - WBC 3.21  - H&H 8.2/26.4 respectively  - Platelets 63  - Hep C antibody reactive  - CT of abdomen and pelvis showed ascites throughout the abdomen with anasarca of the soft tissues related to hepatic failure.  Cirrhotic changes of the liver and diffuse gallbladder wall thickening likely related to underlying liver disease.  - Dilaudid 1 mg every 3 hours as needed pain ordered  - Ultrasound of liver pending  - GI consulted     Alcohol abuse  - Patient currently resident at [facility] alcohol rehab facility  - Patient states she still continues to drink  - [facility] protocol initiated  - Case management consulted     I seen and evaluated this patient this morning.  Patient states that she has been having increased urination due to Lasix given while in the emergency department.  Patient's complaining of throat pain for 1 month since colonoscopy completed, patient also with complaints of abdominal pain.  Chloraseptic Spray, as well as Dilaudid every 3 as needed ordered.  Plan is for patient to go for therapeutic  paracentesis today.  Will reevaluate patient, as well as labs in the morning.     VTE Prophylaxis:  Mechanical VTE prophylaxis orders are present.           Code status is       Code Status and Medical Interventions: CPR (Attempt to Resuscitate); Full Support   Ordered at: 08/09/24 0239     Code Status (Patient has no pulse and is not breathing):     CPR (Attempt to Resuscitate)     Medical Interventions (Patient has pulse or is breathing):     Full Support         Plan for disposition: [facility] rehab facility 8/11/2024     Time: 30 minutes     Signature: Electronically signed by Danilo Guerra MD, 08/12/24, 09:14 EDT.  [facility] Hospitalist Team        Addendum:     I saw and examined the patient in addition to the ELLIOTT.  I agree with assessment and plan with the following addendum:     General Appearance:  Alert, cooperative, no distress, appears stated age  Head:  Normocephalic, without obvious abnormality, atraumatic  Eyes:   PERRL, conjunctiva/corneas clear, EOM's intact, fundi benign, both eyes  Ears:   Normal TM's and external ear canals, both ears  Nose:Nares normal, septum midline, mucosa normal, no drainage or sinus tenderness  Throat:Lips, mucosa, and tongue normal; teeth and gums normal  Neck:Supple, symmetrical, trachea midline, no adenopathy, thyroid: not enlarged, symmetric, no tenderness/mass/nodules, no carotid bruit or JVD  Lungs:            Clear to auscultation bilaterally, respirations unlabored  Heart: Regular rate and rhythm, S1, S2 normal, no murmur, rub or gallop  Abdomen:  Soft, non-tender, bowel sounds active all four quadrants,  no masses, no organomegaly  Extremities:Extremities normal, atraumatic, no cyanosis or edema  Pulses:2+ and symmetric  Skin:Skin color, texture, turgor normal, no rashes or lesions  Neurologic: Normal        Patient underwent paracentesis today with about 1 L of fluid removed.  This was likely the cause of her abdominal pain.  However she would likely have  recurrence of this fluid.  I will start her on IV Lasix and transition her to oral Lasix on discharge.  She would also benefit from low-dose spironolactone on discharge.  Continue pain control.

## 2024-08-13 NOTE — PLAN OF CARE
Goal Outcome Evaluation: Plan is for patient to return to [facility] today.

## 2024-08-13 NOTE — SIGNIFICANT NOTE
08/13/24 0944   OTHER   Discipline physical therapist   Rehab Time/Intention   Session Not Performed other (see comments)  (hospital d/c pending; RN reports pt has been independent in moving around her room. Will hold eval. If pt does not leave, will evaluate.)   Recommendation   PT - Next Appointment 08/14/24

## 2024-08-13 NOTE — PLAN OF CARE
Problem: Adult Inpatient Plan of Care  Goal: Plan of Care Review  Outcome: Ongoing, Progressing  Goal: Patient-Specific Goal (Individualized)  Outcome: Ongoing, Progressing  Goal: Absence of Hospital-Acquired Illness or Injury  Outcome: Ongoing, Progressing  Intervention: Identify and Manage Fall Risk  Recent Flowsheet Documentation  Taken 8/13/2024 0400 by Briana Gallegos RN  Safety Promotion/Fall Prevention: safety round/check completed  Taken 8/13/2024 0200 by Briana Gallegos RN  Safety Promotion/Fall Prevention: safety round/check completed  Taken 8/13/2024 0000 by Briana Gallegos RN  Safety Promotion/Fall Prevention: safety round/check completed  Taken 8/12/2024 2000 by Briana Gallegos RN  Safety Promotion/Fall Prevention: safety round/check completed  Intervention: Prevent and Manage VTE (Venous Thromboembolism) Risk  Recent Flowsheet Documentation  Taken 8/13/2024 0000 by Briana Gallegos RN  VTE Prevention/Management: patient refused intervention  Taken 8/12/2024 2000 by Briana Gallegos RN  VTE Prevention/Management: patient refused intervention  Intervention: Prevent Infection  Recent Flowsheet Documentation  Taken 8/12/2024 2000 by Briana Gallegos RN  Infection Prevention: hand hygiene promoted  Goal: Optimal Comfort and Wellbeing  Outcome: Ongoing, Progressing  Intervention: Monitor Pain and Promote Comfort  Recent Flowsheet Documentation  Taken 8/12/2024 2000 by Briana Gallegos RN  Pain Management Interventions: see MAR  Intervention: Provide Person-Centered Care  Recent Flowsheet Documentation  Taken 8/12/2024 2000 by Briana Gallegos RN  Trust Relationship/Rapport:   care explained   questions answered   questions encouraged  Goal: Readiness for Transition of Care  Outcome: Ongoing, Progressing     Problem: Pain Acute  Goal: Acceptable Pain Control and Functional Ability  Outcome: Ongoing, Progressing  Intervention: Prevent or Manage Pain  Recent Flowsheet  Documentation  Taken 8/13/2024 0000 by Briana Gallegos RN  Medication Review/Management: medications reviewed  Taken 8/12/2024 2000 by Briana Gallegos RN  Medication Review/Management: medications reviewed  Intervention: Develop Pain Management Plan  Recent Flowsheet Documentation  Taken 8/12/2024 2000 by Briana Gallegos RN  Pain Management Interventions: see MAR  Intervention: Optimize Psychosocial Wellbeing  Recent Flowsheet Documentation  Taken 8/12/2024 2000 by Briana Gallegos RN  Diversional Activities:   television   [employer]     Problem: UTI (Urinary Tract Infection)  Goal: Improved Infection Symptoms  Outcome: Ongoing, Progressing     Problem: Alcohol Withdrawal  Goal: Alcohol Withdrawal Symptom Control  Outcome: Ongoing, Progressing     Problem: Acute Neurologic Deterioration (Alcohol Withdrawal)  Goal: Optimal Neurologic Function  Outcome: Ongoing, Progressing     Problem: Substance Misuse (Alcohol Withdrawal)  Goal: Readiness for Change Identified  Outcome: Ongoing, Progressing  Intervention: Promote Psychosocial Wellbeing  Recent Flowsheet Documentation  Taken 8/12/2024 2000 by Briana Gallegos RN  Family/Support System Care: support provided     Problem: Fall Injury Risk  Goal: Absence of Fall and Fall-Related Injury  Outcome: Ongoing, Progressing  Intervention: Identify and Manage Contributors  Recent Flowsheet Documentation  Taken 8/13/2024 0000 by Briana Gallegos RN  Medication Review/Management: medications reviewed  Taken 8/12/2024 2000 by Briana Gallegos RN  Medication Review/Management: medications reviewed  Intervention: Promote Injury-Free Environment  Recent Flowsheet Documentation  Taken 8/13/2024 0400 by Briana Gallegos RN  Safety Promotion/Fall Prevention: safety round/check completed  Taken 8/13/2024 0200 by Briana Gallegos RN  Safety Promotion/Fall Prevention: safety round/check completed  Taken 8/13/2024 0000 by Briana Gallegos RN  Safety  Promotion/Fall Prevention: safety round/check completed  Taken 8/12/2024 2000 by Briana Gallegos RN  Safety Promotion/Fall Prevention: safety round/check completed   Goal Outcome Evaluation:

## 2024-08-14 ENCOUNTER — HOSPITAL ENCOUNTER (OUTPATIENT)
Facility: HOSPITAL | Age: 44
Setting detail: OBSERVATION
Discharge: HOME | End: 2024-08-18
Attending: EMERGENCY MEDICINE | Admitting: STUDENT IN AN ORGANIZED HEALTH CARE EDUCATION/TRAINING PROGRAM
Payer: MEDICAID

## 2024-08-14 ENCOUNTER — APPOINTMENT (OUTPATIENT)
Dept: CT IMAGING | Facility: HOSPITAL | Age: 44
End: 2024-08-14
Payer: MEDICAID

## 2024-08-14 ENCOUNTER — APPOINTMENT (OUTPATIENT)
Dept: GENERAL RADIOLOGY | Facility: HOSPITAL | Age: 44
End: 2024-08-14
Payer: MEDICAID

## 2024-08-14 DIAGNOSIS — R00.1 SYMPTOMATIC BRADYCARDIA: Primary | ICD-10-CM

## 2024-08-14 DIAGNOSIS — R07.9 CHEST PAIN, UNSPECIFIED TYPE: ICD-10-CM

## 2024-08-14 DIAGNOSIS — R06.02 SHORTNESS OF BREATH: ICD-10-CM

## 2024-08-14 PROBLEM — E72.20 HYPERAMMONEMIA: Status: ACTIVE | Noted: 2024-08-14

## 2024-08-14 LAB
ALBUMIN SERPL-MCNC: 2.6 G/DL (ref 3.5–5.2)
ALBUMIN/GLOB SERPL: 0.6 G/DL
ALP SERPL-CCNC: 146 U/L (ref 39–117)
ALT SERPL W P-5'-P-CCNC: 51 U/L (ref 1–33)
AMMONIA BLD-SCNC: 82 UMOL/L (ref 11–51)
ANION GAP SERPL CALCULATED.3IONS-SCNC: 8.6 MMOL/L (ref 5–15)
ANISOCYTOSIS BLD QL: ABNORMAL
AST SERPL-CCNC: 89 U/L (ref 1–32)
BACTERIA FLD CULT: NORMAL
BILIRUB SERPL-MCNC: 3.7 MG/DL (ref 0–1.2)
BUN SERPL-MCNC: 11 MG/DL (ref 6–20)
BUN/CREAT SERPL: 15.9 (ref 7–25)
CALCIUM SPEC-SCNC: 9.1 MG/DL (ref 8.6–10.5)
CHLORIDE SERPL-SCNC: 102 MMOL/L (ref 98–107)
CO2 SERPL-SCNC: 25.4 MMOL/L (ref 22–29)
CREAT SERPL-MCNC: 0.69 MG/DL (ref 0.57–1)
D DIMER PPP FEU-MCNC: 1.42 MG/L (FEU) (ref 0–0.5)
DEPRECATED RDW RBC AUTO: 54.8 FL (ref 37–54)
EGFRCR SERPLBLD CKD-EPI 2021: 109.2 ML/MIN/1.73
EOSINOPHIL # BLD MANUAL: 0.09 10*3/MM3 (ref 0–0.4)
EOSINOPHIL NFR BLD MANUAL: 2 % (ref 0.3–6.2)
ERYTHROCYTE [DISTWIDTH] IN BLOOD BY AUTOMATED COUNT: 13.8 % (ref 12.3–15.4)
FLUAV RNA RESP QL NAA+PROBE: NOT DETECTED
FLUBV RNA RESP QL NAA+PROBE: NOT DETECTED
GEN 5 2HR TROPONIN T REFLEX: <6 NG/L
GLOBULIN UR ELPH-MCNC: 4.3 GM/DL
GLUCOSE SERPL-MCNC: 126 MG/DL (ref 65–99)
GRAM STN SPEC: NORMAL
GRAM STN SPEC: NORMAL
HCT VFR BLD AUTO: 31.2 % (ref 34–46.6)
HCV GENTYP SERPL NAA+PROBE: NORMAL
HGB BLD-MCNC: 9.9 G/DL (ref 12–15.9)
HOLD SPECIMEN: NORMAL
LYMPHOCYTES # BLD MANUAL: 1.69 10*3/MM3 (ref 0.7–3.1)
LYMPHOCYTES NFR BLD MANUAL: 15 % (ref 5–12)
MACROCYTES BLD QL SMEAR: ABNORMAL
MCH RBC QN AUTO: 34.1 PG (ref 26.6–33)
MCHC RBC AUTO-ENTMCNC: 31.7 G/DL (ref 31.5–35.7)
MCV RBC AUTO: 107.6 FL (ref 79–97)
MONOCYTES # BLD: 0.68 10*3/MM3 (ref 0.1–0.9)
NEUTROPHILS # BLD AUTO: 2.1 10*3/MM3 (ref 1.7–7)
NEUTROPHILS NFR BLD MANUAL: 44 % (ref 42.7–76)
NEUTS BAND NFR BLD MANUAL: 2 % (ref 0–5)
NT-PROBNP SERPL-MCNC: 122 PG/ML (ref 0–450)
PLATELET # BLD AUTO: 86 10*3/MM3 (ref 140–450)
PMV BLD AUTO: 10.8 FL (ref 6–12)
POTASSIUM SERPL-SCNC: 4 MMOL/L (ref 3.5–5.2)
PROT SERPL-MCNC: 6.9 G/DL (ref 6–8.5)
QT INTERVAL: 418 MS
QTC INTERVAL: 469 MS
RBC # BLD AUTO: 2.9 10*6/MM3 (ref 3.77–5.28)
RSV RNA RESP QL NAA+PROBE: NOT DETECTED
SARS-COV-2 RNA RESP QL NAA+PROBE: NOT DETECTED
SCAN SLIDE: NORMAL
SMALL PLATELETS BLD QL SMEAR: ABNORMAL
SODIUM SERPL-SCNC: 136 MMOL/L (ref 136–145)
TROPONIN T DELTA: NORMAL
TROPONIN T SERPL HS-MCNC: <6 NG/L
VARIANT LYMPHS NFR BLD MANUAL: 2 % (ref 0–5)
VARIANT LYMPHS NFR BLD MANUAL: 35 % (ref 19.6–45.3)
WBC MORPH BLD: NORMAL
WBC NRBC COR # BLD AUTO: 4.56 10*3/MM3 (ref 3.4–10.8)

## 2024-08-14 PROCEDURE — 82140 ASSAY OF AMMONIA: CPT | Performed by: EMERGENCY MEDICINE

## 2024-08-14 PROCEDURE — 84484 ASSAY OF TROPONIN QUANT: CPT | Performed by: EMERGENCY MEDICINE

## 2024-08-14 PROCEDURE — 85379 FIBRIN DEGRADATION QUANT: CPT | Performed by: EMERGENCY MEDICINE

## 2024-08-14 PROCEDURE — 36415 COLL VENOUS BLD VENIPUNCTURE: CPT

## 2024-08-14 PROCEDURE — 25510000001 IOPAMIDOL PER 1 ML: Performed by: EMERGENCY MEDICINE

## 2024-08-14 PROCEDURE — 87637 SARSCOV2&INF A&B&RSV AMP PRB: CPT | Performed by: STUDENT IN AN ORGANIZED HEALTH CARE EDUCATION/TRAINING PROGRAM

## 2024-08-14 PROCEDURE — 83880 ASSAY OF NATRIURETIC PEPTIDE: CPT | Performed by: EMERGENCY MEDICINE

## 2024-08-14 PROCEDURE — 80053 COMPREHEN METABOLIC PANEL: CPT | Performed by: EMERGENCY MEDICINE

## 2024-08-14 PROCEDURE — 99285 EMERGENCY DEPT VISIT HI MDM: CPT

## 2024-08-14 PROCEDURE — 96374 THER/PROPH/DIAG INJ IV PUSH: CPT

## 2024-08-14 PROCEDURE — G0378 HOSPITAL OBSERVATION PER HR: HCPCS

## 2024-08-14 PROCEDURE — 85007 BL SMEAR W/DIFF WBC COUNT: CPT | Performed by: EMERGENCY MEDICINE

## 2024-08-14 PROCEDURE — 71275 CT ANGIOGRAPHY CHEST: CPT

## 2024-08-14 PROCEDURE — 99291 CRITICAL CARE FIRST HOUR: CPT

## 2024-08-14 PROCEDURE — 85025 COMPLETE CBC W/AUTO DIFF WBC: CPT | Performed by: EMERGENCY MEDICINE

## 2024-08-14 PROCEDURE — 93005 ELECTROCARDIOGRAM TRACING: CPT | Performed by: EMERGENCY MEDICINE

## 2024-08-14 PROCEDURE — 71045 X-RAY EXAM CHEST 1 VIEW: CPT

## 2024-08-14 PROCEDURE — 25010000002 ATROPINE SULFATE: Performed by: EMERGENCY MEDICINE

## 2024-08-14 PROCEDURE — 93005 ELECTROCARDIOGRAM TRACING: CPT

## 2024-08-14 RX ORDER — ALBUTEROL SULFATE 90 UG/1
2 AEROSOL, METERED RESPIRATORY (INHALATION) EVERY 4 HOURS PRN
Status: DISCONTINUED | OUTPATIENT
Start: 2024-08-14 | End: 2024-08-14

## 2024-08-14 RX ORDER — LACTULOSE 10 G/15ML
20 SOLUTION ORAL ONCE
Status: COMPLETED | OUTPATIENT
Start: 2024-08-14 | End: 2024-08-14

## 2024-08-14 RX ORDER — PANTOPRAZOLE SODIUM 40 MG/1
40 TABLET, DELAYED RELEASE ORAL
Status: DISCONTINUED | OUTPATIENT
Start: 2024-08-15 | End: 2024-08-18 | Stop reason: HOSPADM

## 2024-08-14 RX ORDER — SODIUM CHLORIDE 0.9 % (FLUSH) 0.9 %
10 SYRINGE (ML) INJECTION AS NEEDED
Status: DISCONTINUED | OUTPATIENT
Start: 2024-08-14 | End: 2024-08-18 | Stop reason: HOSPADM

## 2024-08-14 RX ADMIN — ATROPINE SULFATE 0.5 MG: 0.1 INJECTION INTRAVENOUS at 20:49

## 2024-08-14 RX ADMIN — IOPAMIDOL 100 ML: 755 INJECTION, SOLUTION INTRAVENOUS at 21:30

## 2024-08-14 RX ADMIN — Medication 10 ML: at 20:50

## 2024-08-14 RX ADMIN — LACTULOSE 20 G: 10 SOLUTION ORAL at 20:32

## 2024-08-14 NOTE — Clinical Note
Level of Care: Telemetry [5]   Admitting Physician: EVARISTO LOPEZ [[id number]]   Attending Physician: EVARISTO LOPEZ [[id number]]

## 2024-08-14 NOTE — CASE MANAGEMENT/SOCIAL WORK
Case Management Discharge Note      Final Note: Routine home ([facility])    Provided Post Acute Provider List?: N/A  Provided Post Acute Provider Quality & Resource List?: N/A    Selected Continued Care - Discharged on 8/13/2024 Admission date: 8/8/2024 - Discharge disposition: Rehab Facility or Unit (DC - External)         Transportation Services  Private: Car    Final Discharge Disposition Code: 01 - home or self-care

## 2024-08-15 LAB
ALBUMIN SERPL-MCNC: 2.5 G/DL (ref 3.5–5.2)
ALBUMIN/GLOB SERPL: 0.6 G/DL
ALP SERPL-CCNC: 137 U/L (ref 39–117)
ALT SERPL W P-5'-P-CCNC: 48 U/L (ref 1–33)
ANION GAP SERPL CALCULATED.3IONS-SCNC: 7.5 MMOL/L (ref 5–15)
ANISOCYTOSIS BLD QL: ABNORMAL
AST SERPL-CCNC: 81 U/L (ref 1–32)
BASOPHILS # BLD MANUAL: 0.04 10*3/MM3 (ref 0–0.2)
BASOPHILS NFR BLD MANUAL: 1 % (ref 0–1.5)
BILIRUB SERPL-MCNC: 3.3 MG/DL (ref 0–1.2)
BUN SERPL-MCNC: 10 MG/DL (ref 6–20)
BUN/CREAT SERPL: 14.9 (ref 7–25)
BURR CELLS BLD QL SMEAR: ABNORMAL
CALCIUM SPEC-SCNC: 8.7 MG/DL (ref 8.6–10.5)
CHLORIDE SERPL-SCNC: 106 MMOL/L (ref 98–107)
CO2 SERPL-SCNC: 24.5 MMOL/L (ref 22–29)
CREAT SERPL-MCNC: 0.67 MG/DL (ref 0.57–1)
DEPRECATED RDW RBC AUTO: 56.7 FL (ref 37–54)
EGFRCR SERPLBLD CKD-EPI 2021: 110 ML/MIN/1.73
EOSINOPHIL # BLD MANUAL: 0.15 10*3/MM3 (ref 0–0.4)
EOSINOPHIL NFR BLD MANUAL: 4 % (ref 0.3–6.2)
ERYTHROCYTE [DISTWIDTH] IN BLOOD BY AUTOMATED COUNT: 13.8 % (ref 12.3–15.4)
GLOBULIN UR ELPH-MCNC: 3.9 GM/DL
GLUCOSE SERPL-MCNC: 115 MG/DL (ref 65–99)
HCT VFR BLD AUTO: 30.7 % (ref 34–46.6)
HGB BLD-MCNC: 9.5 G/DL (ref 12–15.9)
LYMPHOCYTES # BLD MANUAL: 1.63 10*3/MM3 (ref 0.7–3.1)
LYMPHOCYTES NFR BLD MANUAL: 7 % (ref 5–12)
MACROCYTES BLD QL SMEAR: ABNORMAL
MAGNESIUM SERPL-MCNC: 1.5 MG/DL (ref 1.6–2.6)
MCH RBC QN AUTO: 34.5 PG (ref 26.6–33)
MCHC RBC AUTO-ENTMCNC: 30.9 G/DL (ref 31.5–35.7)
MCV RBC AUTO: 111.6 FL (ref 79–97)
METAMYELOCYTES NFR BLD MANUAL: 1 % (ref 0–0)
MONOCYTES # BLD: 0.27 10*3/MM3 (ref 0.1–0.9)
NEUTROPHILS # BLD AUTO: 1.67 10*3/MM3 (ref 1.7–7)
NEUTROPHILS NFR BLD MANUAL: 42 % (ref 42.7–76)
NEUTS BAND NFR BLD MANUAL: 2 % (ref 0–5)
PLATELET # BLD AUTO: 76 10*3/MM3 (ref 140–450)
PMV BLD AUTO: 11.4 FL (ref 6–12)
POIKILOCYTOSIS BLD QL SMEAR: ABNORMAL
POTASSIUM SERPL-SCNC: 3.8 MMOL/L (ref 3.5–5.2)
PROT SERPL-MCNC: 6.4 G/DL (ref 6–8.5)
QT INTERVAL: 531 MS
QTC INTERVAL: 454 MS
RBC # BLD AUTO: 2.75 10*6/MM3 (ref 3.77–5.28)
SCAN SLIDE: NORMAL
SMALL PLATELETS BLD QL SMEAR: ABNORMAL
SODIUM SERPL-SCNC: 138 MMOL/L (ref 136–145)
VARIANT LYMPHS NFR BLD MANUAL: 2 % (ref 0–5)
VARIANT LYMPHS NFR BLD MANUAL: 41 % (ref 19.6–45.3)
WBC MORPH BLD: NORMAL
WBC NRBC COR # BLD AUTO: 3.8 10*3/MM3 (ref 3.4–10.8)

## 2024-08-15 PROCEDURE — G0378 HOSPITAL OBSERVATION PER HR: HCPCS

## 2024-08-15 PROCEDURE — 85007 BL SMEAR W/DIFF WBC COUNT: CPT | Performed by: NURSE PRACTITIONER

## 2024-08-15 PROCEDURE — 80053 COMPREHEN METABOLIC PANEL: CPT | Performed by: NURSE PRACTITIONER

## 2024-08-15 PROCEDURE — 85025 COMPLETE CBC W/AUTO DIFF WBC: CPT | Performed by: NURSE PRACTITIONER

## 2024-08-15 PROCEDURE — 99215 OFFICE O/P EST HI 40 MIN: CPT | Performed by: INTERNAL MEDICINE

## 2024-08-15 PROCEDURE — 83735 ASSAY OF MAGNESIUM: CPT | Performed by: NURSE PRACTITIONER

## 2024-08-15 RX ORDER — URSODIOL 300 MG/1
300 CAPSULE ORAL 2 TIMES DAILY
Status: DISCONTINUED | OUTPATIENT
Start: 2024-08-15 | End: 2024-08-18 | Stop reason: HOSPADM

## 2024-08-15 RX ORDER — CLONIDINE HYDROCHLORIDE 0.1 MG/1
0.1 TABLET ORAL EVERY 6 HOURS PRN
COMMUNITY
End: 2024-08-18 | Stop reason: HOSPADM

## 2024-08-15 RX ORDER — TRAMADOL HYDROCHLORIDE 50 MG/1
50 TABLET ORAL EVERY 6 HOURS PRN
Status: DISCONTINUED | OUTPATIENT
Start: 2024-08-15 | End: 2024-08-18 | Stop reason: HOSPADM

## 2024-08-15 RX ORDER — TRAZODONE HYDROCHLORIDE 50 MG/1
50 TABLET ORAL NIGHTLY PRN
COMMUNITY

## 2024-08-15 RX ORDER — MIDODRINE HYDROCHLORIDE 5 MG/1
10 TABLET ORAL EVERY 8 HOURS
Status: DISCONTINUED | OUTPATIENT
Start: 2024-08-15 | End: 2024-08-18 | Stop reason: HOSPADM

## 2024-08-15 RX ORDER — HYDROXYZINE HYDROCHLORIDE 25 MG/1
25 TABLET, FILM COATED ORAL 3 TIMES DAILY PRN
Status: DISCONTINUED | OUTPATIENT
Start: 2024-08-15 | End: 2024-08-18 | Stop reason: HOSPADM

## 2024-08-15 RX ORDER — TRAZODONE HYDROCHLORIDE 50 MG/1
50 TABLET ORAL NIGHTLY PRN
Status: DISCONTINUED | OUTPATIENT
Start: 2024-08-15 | End: 2024-08-18 | Stop reason: HOSPADM

## 2024-08-15 RX ORDER — DICYCLOMINE HYDROCHLORIDE 10 MG/1
20 CAPSULE ORAL 3 TIMES DAILY
Status: DISCONTINUED | OUTPATIENT
Start: 2024-08-15 | End: 2024-08-18 | Stop reason: HOSPADM

## 2024-08-15 RX ORDER — TIZANIDINE 4 MG/1
4 TABLET ORAL EVERY 8 HOURS PRN
COMMUNITY

## 2024-08-15 RX ORDER — CALCIUM CARBONATE 500 MG/1
1 TABLET, CHEWABLE ORAL DAILY
COMMUNITY

## 2024-08-15 RX ORDER — FOLIC ACID 1 MG/1
1 TABLET ORAL DAILY
Status: DISCONTINUED | OUTPATIENT
Start: 2024-08-15 | End: 2024-08-18 | Stop reason: HOSPADM

## 2024-08-15 RX ORDER — TIZANIDINE 4 MG/1
4 TABLET ORAL EVERY 8 HOURS PRN
Status: DISCONTINUED | OUTPATIENT
Start: 2024-08-15 | End: 2024-08-18 | Stop reason: HOSPADM

## 2024-08-15 RX ORDER — LACTULOSE 10 G/15ML
30 SOLUTION ORAL 2 TIMES DAILY
Status: DISCONTINUED | OUTPATIENT
Start: 2024-08-15 | End: 2024-08-18 | Stop reason: HOSPADM

## 2024-08-15 RX ORDER — DOCUSATE SODIUM 100 MG/1
100 CAPSULE, LIQUID FILLED ORAL DAILY PRN
Status: DISCONTINUED | OUTPATIENT
Start: 2024-08-15 | End: 2024-08-18 | Stop reason: HOSPADM

## 2024-08-15 RX ORDER — ONDANSETRON 4 MG/1
4 TABLET, FILM COATED ORAL EVERY 12 HOURS PRN
COMMUNITY

## 2024-08-15 RX ORDER — LOPERAMIDE HYDROCHLORIDE 2 MG/1
4 CAPSULE ORAL EVERY 8 HOURS PRN
COMMUNITY
End: 2024-08-18 | Stop reason: HOSPADM

## 2024-08-15 RX ORDER — DICYCLOMINE HCL 20 MG
20 TABLET ORAL 3 TIMES DAILY PRN
COMMUNITY

## 2024-08-15 RX ORDER — DOCUSATE SODIUM 100 MG/1
100 CAPSULE, LIQUID FILLED ORAL DAILY PRN
COMMUNITY

## 2024-08-15 RX ORDER — HYDROXYZINE PAMOATE 50 MG/1
50 CAPSULE ORAL EVERY 6 HOURS PRN
COMMUNITY

## 2024-08-15 RX ADMIN — DICYCLOMINE HYDROCHLORIDE 20 MG: 10 CAPSULE ORAL at 16:01

## 2024-08-15 RX ADMIN — MIDODRINE HYDROCHLORIDE 10 MG: 5 TABLET ORAL at 14:23

## 2024-08-15 RX ADMIN — TRAMADOL HYDROCHLORIDE 50 MG: 50 TABLET ORAL at 19:27

## 2024-08-15 RX ADMIN — RIFAXIMIN 550 MG: 550 TABLET ORAL at 21:12

## 2024-08-15 RX ADMIN — URSODIOL 300 MG: 300 CAPSULE ORAL at 21:12

## 2024-08-15 RX ADMIN — HYDROXYZINE HYDROCHLORIDE 25 MG: 25 TABLET, FILM COATED ORAL at 21:12

## 2024-08-15 RX ADMIN — FOLIC ACID 1 MG: 1 TABLET ORAL at 14:23

## 2024-08-15 RX ADMIN — PANTOPRAZOLE SODIUM 40 MG: 40 TABLET, DELAYED RELEASE ORAL at 05:50

## 2024-08-15 RX ADMIN — DICYCLOMINE HYDROCHLORIDE 20 MG: 10 CAPSULE ORAL at 21:12

## 2024-08-15 NOTE — DISCHARGE PLACEMENT REQUEST
"Please see below clinical information for patientRosalina. Does not have discharge order at time of faxing, but want to confirm ability to return once medically stable for hospital d/c.     KWASI MetzgerW, LSW, [facility]    Phone: [phone]  Cell: [phone]  Fax: [phone]  [email or web address]    ____________________________________________________________________________________________    Rosalina Byrnes (43 y.o. Female)       Date of Birth   1980    Social Security Number       Address   [address] [place] [address]    Home Phone   [phone]    MRN   [id number]       Yarsani   None    Marital Status   Significant Other                            Admission Date   8/14/24    Admission Type   Emergency    Admitting Provider   Shay Thornton MD    Attending Provider   Shay Thornton MD    Department, Room/Bed   [facility], 2107/1       Discharge Date       Discharge Disposition       Discharge Destination                                 Attending Provider: Shay Thornton MD    Allergies: No Known Allergies    Isolation: None   Infection: None   Code Status: CPR    Ht: 165.1 cm (65\")   Wt: 81.6 kg (179 lb 14.3 oz)    Admission Cmt: None   Principal Problem: Bradycardia [R00.1]                   Active Insurance as of 8/14/2024       Primary Coverage       Payor Plan Insurance Group Employer/Plan Group    [facility] [place] [facility]        Payor Plan Address Payor Plan Phone Number Payor Plan Fax Number Effective Dates    [address]   8/8/2024 - None Entered    [facility] [id number]         Subscriber Name Subscriber Birth Date Member ID       ROSALINA BYRNES 1980 [id number]                     Emergency Contacts        (Rel.) Home Phone Work Phone Mobile Phone    Nick Galvan (Significant Other) -- -- [phone]                 History & Physical        Essing-Meryl Chavez, APRN " at 24 [phone]       Attestation signed by Red Pittman DO at 08/15/24 0143    I have reviewed this documentation and agree.      Electronically signed by Red Pittman DO, 08/15/24, 1:43 AM EDT.                      [facility]  History & Physical    Patient Name: Maria Elena Byrnes  : 1980  MRN: [id number]  Primary Care Physician:  Provider, No Known  Date of admission: 2024  Date and Time of Service: 2024 at 2300    Subjective      Chief Complaint: shortness of air     History of Present Illness: Maria Elena Byrnes is a 43 y.o. female with a CMH of alcoholic cirrhosis frequent ascites, chronic hepatitis C, chronically elevated LFTs, anemia, thrombocytopenia, GERD with a history of esophageal varices, major depressive disorder who presented to [facility] on 2024 with complaints of shortness of breath.  Denies any chest pain, cough congestion or fever.  She is originally from the [facility] but is in a local alcohol rehab facility called [facility].  She was just discharged from this hospital yesterday after  4 days for an admission for abdominal pain and edema in the abdomen and bilateral lower extremities.  She was followed by gastroenterology here as well as cardiology diagnosed questionable paroxysmal atrial fibrillation with slow ventricular rate.  Not been put on anticoagulation due to esophageal varices cannot be put on beta-blockers due to bradycardia.  Echocardiogram during that admission showed preserved left ventricular function grade 2 diastolic dysfunction.  She is stable on room air.  However while in the emergency department it was noted her heart rate was in the high 30s to low 40s which necessitated giving her 0.5 mg atropine.  Labs today show troponin less than 6 proBNP 122, LFTs are elevated but appears to be at baseline with alk phos 146 AST 89 ALT 51 total bilirubin 3.7 albumin chronically low at 2.6 and stable,  ammonia was 82 and she was given  lactulose in the emergency department.  Lethargic but awake alert and answering appropriately.  Complains of abdominal cramping but denies any nausea vomiting or diarrhea.  Hemoglobin 9.9 stable WBC not elevated, D-dimer elevated at 1.42 CTA chest per radiology shows no pulmonary embolism no acute cardio thoracic process suspected cirrhotic liver with small upper abdominal ascites and splenomegaly and emphysema coronary artery calcifications. She will be admitted for continuous cardiac monitoring and cardiology has been consulted.      Review of Systems   Constitutional: Negative.    HENT: Negative.     Eyes: Negative.    Respiratory:  Positive for shortness of breath.    Cardiovascular: Negative.    Gastrointestinal:  Positive for abdominal pain.        Cramping   Endocrine: Negative.    Genitourinary: Negative.    Musculoskeletal: Negative.    Skin: Negative.    Allergic/Immunologic: Negative.    Neurological: Negative.    Hematological: Negative.    Psychiatric/Behavioral:  Positive for behavioral problems.    All other systems reviewed and are negative.      Personal History     Past Medical History:   Diagnosis Date    Alcohol abuse 11/08/2022    Anemia of chronic disease 07/29/2024    Chronic GERD 08/09/2024    Chronic hepatitis C virus infection 11/08/2022    HAVP Negative 11/03/2022     HEPBSAG Negative 11/03/2022     HEPBCAB Negative 11/03/2022     HEPC10 Positive (A) 11/03/2022             Cirrhosis     Colitis 07/17/2024    Elevated LFTs 11/06/2019    Esophageal varices     Lesion of vocal fold 07/29/2024    Major depressive disorder, recurrent episode, moderate degree 02/17/2024    Neuropathy 08/09/2024    Pelvic congestion syndrome 07/28/2023    Portal vein thrombosis 07/11/2024    Sepsis without acute organ dysfunction 11/03/2022       History reviewed. No pertinent surgical history.    Family History: Family history is unknown by patient. Otherwise pertinent FHx was reviewed and not pertinent to  current issue.    Social History:  reports that she has been smoking cigarettes. She uses smokeless tobacco. She reports current alcohol use. She reports current drug use. Drug: Marijuana.    Home Medications:  Prior to Admission Medications       Prescriptions Last Dose Informant Patient Reported? Taking?    folic acid (FOLVITE) 1 MG tablet   No No    Take 1 tablet by mouth Daily.    lactulose (CHRONULAC) 10 GM/15ML solution   No No    Take 45 mL by mouth 2 (Two) Times a Day.    midodrine (PROAMATINE) 10 MG tablet   No No    Take 1 tablet by mouth Every 8 (Eight) Hours.    riFAXIMin (XIFAXAN) 550 MG tablet   No No    Take 1 tablet by mouth Every 12 (Twelve) Hours for 30 days. Indications: Impaired Brain Function due to Liver Disease    spironolactone (ALDACTONE) 100 MG tablet   No No    Take 1 tablet by mouth Daily.    Thiamine Mononitrate 100 MG tablet   No No    Take 1 tablet by mouth Daily.    ursodiol (ACTIGALL) 300 MG capsule   No No    Take 1 capsule by mouth 2 (Two) Times a Day.              Allergies:  No Known Allergies    Objective      Vitals:   Temp:  [97.4 °F (36.3 °C)-98.2 °F (36.8 °C)] 98.1 °F (36.7 °C)  Heart Rate:  [40-59] 55  Resp:  [12-20] 12  BP: ()/(61-77) 120/77  Body mass index is 28.76 kg/m².  Physical Exam  Vitals reviewed.   Constitutional:       Appearance: She is ill-appearing.   HENT:      Head: Normocephalic and atraumatic.      Right Ear: External ear normal.      Left Ear: External ear normal.      Nose: Nose normal.      Mouth/Throat:      Mouth: Mucous membranes are moist.   Eyes:      Extraocular Movements: Extraocular movements intact.   Cardiovascular:      Rate and Rhythm: Regular rhythm. Bradycardia present.      Pulses: Normal pulses.      Heart sounds: Normal heart sounds.   Pulmonary:      Effort: Pulmonary effort is normal.      Breath sounds: Normal breath sounds.   Abdominal:      General: There is distension.      Palpations: Abdomen is soft.   Genitourinary:      Comments: deferred  Musculoskeletal:         General: Normal range of motion.      Cervical back: Normal range of motion and neck supple.   Skin:     General: Skin is warm and dry.      Coloration: Skin is jaundiced.   Neurological:      General: No focal deficit present.      Mental Status: She is alert and oriented to person, place, and time.   Psychiatric:         Mood and Affect: Mood normal.         Behavior: Behavior normal.         Thought Content: Thought content normal.         Judgment: Judgment normal.         Diagnostic Data:            Lab Results (last 24 hours)       Procedure Component Value Units Date/Time    High Sensitivity Troponin T 2Hr [[id number]] Collected: 08/14/24 2233    Specimen: Blood Updated: 08/14/24 2301     HS Troponin T <6 ng/L      Troponin T Delta --     Comment: Unable to calculate.       Narrative:      High Sensitive Troponin T Reference Range:  <14.0 ng/L- Negative Female for AMI  <22.0 ng/L- Negative Male for AMI  >=14 - Abnormal Female indicating possible myocardial injury.  >=22 - Abnormal Male indicating possible myocardial injury.   Clinicians would have to utilize clinical acumen, EKG, Troponin, and serial changes to determine if it is an Acute Myocardial Infarction or myocardial injury due to an underlying chronic condition.         CBC & Differential [[id number]]  (Abnormal) Collected: 08/14/24 1925    Specimen: Blood from Arm, Left Updated: 08/14/24 2017    Narrative:      The following orders were created for panel order CBC & Differential.  Procedure                               Abnormality         Status                     ---------                               -----------         ------                     CBC Auto Differential[[id number]]        Abnormal            Final result               Scan Slide[[id number]]                                       Final result                 Please view results for these tests on the individual orders.    Scan Slide  [[id number]] Collected: 08/14/24 1925    Specimen: Blood from Arm, Left Updated: 08/14/24 2017     Scan Slide --     Comment: See Manual Differential Results       Manual Differential [[id number]]  (Abnormal) Collected: 08/14/24 1925    Specimen: Blood from Arm, Left Updated: 08/14/24 2017     Neutrophil % 44.0 %      Lymphocyte % 35.0 %      Monocyte % 15.0 %      Eosinophil % 2.0 %      Bands %  2.0 %      Atypical Lymphocyte % 2.0 %      Neutrophils Absolute 2.10 10*3/mm3      Lymphocytes Absolute 1.69 10*3/mm3      Monocytes Absolute 0.68 10*3/mm3      Eosinophils Absolute 0.09 10*3/mm3      Anisocytosis Slight/1+     Macrocytes Slight/1+     WBC Morphology Normal     Platelet Estimate Decreased    CBC Auto Differential [[id number]]  (Abnormal) Collected: 08/14/24 1925    Specimen: Blood from Arm, Left Updated: 08/14/24 2017     WBC 4.56 10*3/mm3      RBC 2.90 10*6/mm3      Hemoglobin 9.9 g/dL      Hematocrit 31.2 %      .6 fL      MCH 34.1 pg      MCHC 31.7 g/dL      RDW 13.8 %      RDW-SD 54.8 fl      MPV 10.8 fL      Platelets 86 10*3/mm3     Narrative:      The previously reported component NRBC is no longer being reported. Previous result was 0.0 /100 WBC (Reference Range: 0.0-0.2 /100 WBC) on 8/14/2024 at 1948 EDT.    Ammonia [[id number]]  (Abnormal) Collected: 08/14/24 1936    Specimen: Blood Updated: 08/14/24 2004     Ammonia 82 umol/L     Comprehensive Metabolic Panel [[id number]]  (Abnormal) Collected: 08/14/24 1925    Specimen: Blood from Arm, Left Updated: 08/14/24 2004     Glucose 126 mg/dL      BUN 11 mg/dL      Creatinine 0.69 mg/dL      Sodium 136 mmol/L      Potassium 4.0 mmol/L      Chloride 102 mmol/L      CO2 25.4 mmol/L      Calcium 9.1 mg/dL      Total Protein 6.9 g/dL      Albumin 2.6 g/dL      ALT (SGPT) 51 U/L      AST (SGOT) 89 U/L      Alkaline Phosphatase 146 U/L      Total Bilirubin 3.7 mg/dL      Globulin 4.3 gm/dL      A/G Ratio 0.6 g/dL      BUN/Creatinine Ratio 15.9     Anion  Gap 8.6 mmol/L      eGFR 109.2 mL/min/1.73     Narrative:      GFR Normal >60  Chronic Kidney Disease <60  Kidney Failure <15      BNP [[id number]]  (Normal) Collected: 08/14/24 1925    Specimen: Blood from Arm, Left Updated: 08/14/24 2004     proBNP 122.0 pg/mL     Narrative:      This assay is used as an aid in the diagnosis of individuals suspected of having heart failure. It can be used as an aid in the diagnosis of acute decompensated heart failure (ADHF) in patients presenting with signs and symptoms of ADHF to the emergency department (ED). In addition, NT-proBNP of <300 pg/mL indicates ADHF is not likely.    Age Range Result Interpretation  NT-proBNP Concentration (pg/mL:      <50             Positive            >450                   Gray                 300-450                    Negative             <300    50-75           Positive            >900                  Gray                300-900                  Negative            <300      >75             Positive            >1800                  Gray                300-1800                  Negative            <300    High Sensitivity Troponin T [[id number]]  (Normal) Collected: 08/14/24 1925    Specimen: Blood from Arm, Left Updated: 08/14/24 2004     HS Troponin T <6 ng/L     Narrative:      High Sensitive Troponin T Reference Range:  <14.0 ng/L- Negative Female for AMI  <22.0 ng/L- Negative Male for AMI  >=14 - Abnormal Female indicating possible myocardial injury.  >=22 - Abnormal Male indicating possible myocardial injury.   Clinicians would have to utilize clinical acumen, EKG, Troponin, and serial changes to determine if it is an Acute Myocardial Infarction or myocardial injury due to an underlying chronic condition.         D-dimer, Quantitative [[id number]]  (Abnormal) Collected: 08/14/24 1925    Specimen: Blood from Arm, Left Updated: 08/14/24 1953     D-Dimer, Quantitative 1.42 mg/L (FEU)     Narrative:      According to the assay 's  "published package insert, a normal (<0.50 mg/L (FEU)) D-dimer result in conjunction with a non-high clinical probability assessment, excludes deep vein thrombosis (DVT) and pulmonary embolism (PE) with high sensitivity.    D-dimer values increase with age and this can make VTE exclusion of an older population difficult. To address this, the American College of Physicians, based on best available evidence and recent guidelines, recommends that clinicians use age-adjusted D-dimer thresholds in patients greater than 50 years of age with: a) a low probability of PE who do not meet all Pulmonary Embolism Rule Out Criteria, or b) in those with intermediate probability of PE.   The formula for an age-adjusted D-dimer cut-off is \"age/100\".  For example, a 60 year old patient would have an age-adjusted cut-off of 0.60 mg/L (FEU) and an 80 year old 0.80 mg/L (FEU).    Extra Tubes [[id number]] Collected: 08/14/24 1925    Specimen: Blood from Arm, Left Updated: 08/14/24 1946    Narrative:      The following orders were created for panel order Extra Tubes.  Procedure                               Abnormality         Status                     ---------                               -----------         ------                     Gold Top - SST[[id number]]                                   Final result                 Please view results for these tests on the individual orders.    Gold Top - SST [[id number]] Collected: 08/14/24 1925    Specimen: Blood from Arm, Left Updated: 08/14/24 1946     Extra Tube Hold for add-ons.     Comment: Auto resulted.                Imaging Results (Last 24 Hours)       Procedure Component Value Units Date/Time    CT Angiogram Chest Pulmonary Embolism [[id number]] Collected: 08/14/24 2148     Updated: 08/14/24 2159    Narrative:      CT ANGIOGRAM CHEST PULMONARY EMBOLISM    Date of Exam: 8/14/2024 9:08 PM EDT    Indication: sob. Elevated D-dimer    Comparison: CT abdomen and pelvis with contrast " 8/9/2024    Technique: Axial CT images were obtained of the chest after the uneventful intravenous administration of iodinated contrast utilizing pulmonary embolism protocol.  Sagittal and coronal reconstructions were performed.  Automated exposure control and   iterative reconstruction methods were used.    Findings:  Visualized soft tissues of the lower neck are without acute abnormality. The heart is mildly enlarged. Aortic valve calcifications. Negative for aortic aneurysm. Coronary artery calcifications are present. No pericardial effusion. No pleural effusion.   Pulmonary arteries are well-opacified with contrast. Negative for pulmonary embolus. No mediastinal or hilar adenopathy. No axillary adenopathy.    Trachea and mainstem bronchi are patent. Emphysema. No pneumothorax. Minimal dependent basilar atelectasis. No findings of pneumonia. No suspicious pulmonary nodule. Calcified left lower lobe granuloma.    Upper abdomen demonstrates small perihepatic ascites. Micronodular cirrhotic liver morphology. Mild splenomegaly unchanged. Normal adrenal glands. Large amount of stool in the colon at the hepatic flexure partially imaged. Upper abdominal aortic branch   vasculature patent. No aggressive osseous lesion.      Impression:      Impression:  1. Negative for pulmonary embolus.  2. No acute intrathoracic process.  3. Suspected cirrhotic liver with small upper abdominal ascites and splenomegaly.  4. Emphysema, coronary artery calcifications, and additional chronic findings above.        Electronically Signed: Sage Sharma MD    8/14/2024 9:57 PM EDT    Workstation ID: [id number]    XR Chest 1 View [[id number]] Collected: 08/14/24 2008     Updated: 08/14/24 2011    Narrative:      XR CHEST 1 VW    Date of Exam: 8/14/2024 7:45 PM EDT    Indication: sob    Comparison: CT abdomen pelvis 8/9/2024    Findings:  Elevated right diaphragm. Cardiomegaly. Left lower lobe airspace opacity atelectasis versus pneumonia similar  to previous study. No definite pleural effusion. Pulmonary vessels are distinct.      Impression:      Impression:  No change from CT abdomen pelvis appearance of chest. Left lower lobe airspace opacity atelectasis versus pneumonia.      Electronically Signed: Karen Salgado MD    8/14/2024 8:09 PM EDT    Workstation ID: [id number]              Assessment & Plan        This is a 43 y.o. female with:    Active and Resolved Problems  Active Hospital Problems    Diagnosis  POA    **Bradycardia [R00.1]  Yes     Priority: High    Hyperammonemia [E72.20]  Yes     Priority: Medium    Alcoholic cirrhosis of liver with ascites [K70.31]  Yes     Priority: Medium    Thrombocytopenia [D69.6]  Yes    Macrocytic anemia [D53.9]  Yes    Chronic GERD [K21.9]  Yes    Major depressive disorder, recurrent episode, moderate degree [F33.1]  Yes    Alcohol abuse [F10.10]  Yes    Tobacco abuse [Z72.0]  Yes      Resolved Hospital Problems   No resolved problems to display.       Bradycardia, possible atrial fibs with slow ventricular rate, required atropine 0.5 mg x 1 in ED continuous cardiac monitoring cardiology consulted    Hyperammonemia, ammonia 82, lactulose given in ED on home lactulose    Alcoholic cirrhosis of liver with ascites chronic recently seen by GI yesterday, on home lactulose, rifaximin spironolactone Actigall, currently in rehab    Thrombocytopenia, stable likely secondary to alcoholic cirrhosis monitor CBC    Macrocytic anemia, stable monitor CBC likely secondary to liver disease    Chronic GERD history of esophageal varices denies hematemesis, 40 mg IV Protonix daily    Major depressive disorder, no home meds per current MAR    Alcohol abuse, currently in rehab    Tobacco abuse, emphysema per CT scan, starting with giving inhalers given bradycardia  VTE Prophylaxis:  Mechanical VTE prophylaxis orders are present.        The patient desires to be as follows:    CODE STATUS:    Code Status (Patient has no pulse and is not  breathing): CPR (Attempt to Resuscitate)  Medical Interventions (Patient has pulse or is breathing): Full Support          Admission Status:  I believe this patient meets inpatient  status.    Expected Length of Stay: pending further evaluation and clinical course     PDMP and Medication Dispenses via Sidebar reviewed and consistent with patient reported medications.    I discussed the patient's findings and my recommendations with patient.      Signature:     This document has been electronically signed by MARC Arnold on August 14, 2024 23:19 EDT   [facility]    Electronically signed by Red Pittman DO at 08/15/24 0143          Physician Progress Notes (last 24 hours)        Jayson Corbin MD at 08/15/24 0628          Referring Provider: Shay Thornton MD    Reason for follow-up: ?  Atrial fibrillation     Patient Care Team:  Provider, No Known as PCP - General      SUBJECTIVE  Patient was just discharged from the hospital and returned.  I have been consulted for bradycardia.     ROS  Review of all systems negative except as indicated.    Since I have last seen, the patient has been without any chest discomfort, shortness of breath, palpitations, dizziness or syncope.  Denies having any headache, abdominal pain, nausea, vomiting, diarrhea, constipation, loss of weight or loss of appetite.  Denies having any excessive bruising, hematuria or blood in the stool.        Personal History:    Past Medical History:   Diagnosis Date    Alcohol abuse 11/08/2022    Anemia of chronic disease 07/29/2024    Chronic GERD 08/09/2024    Chronic hepatitis C virus infection 11/08/2022    HAVP Negative 11/03/2022     HEPBSAG Negative 11/03/2022     HEPBCAB Negative 11/03/2022     HEPC10 Positive (A) 11/03/2022             Cirrhosis     Colitis 07/17/2024    Elevated LFTs 11/06/2019    Esophageal varices     Lesion of vocal fold 07/29/2024    Major depressive disorder, recurrent episode, moderate  degree 02/17/2024    Neuropathy 08/09/2024    Pelvic congestion syndrome 07/28/2023    Portal vein thrombosis 07/11/2024    Sepsis without acute organ dysfunction 11/03/2022       History reviewed. No pertinent surgical history.    Family History   Family history unknown: Yes       Social History     Tobacco Use    Smoking status: Every Day     Types: Cigarettes    Smokeless tobacco: Current   Vaping Use    Vaping status: Every Day    Substances: Nicotine, THC, CBD, Flavoring    Devices: Disposable   Substance Use Topics    Alcohol use: Yes     Comment: Daily - 1-2 pints a day currently    Drug use: Yes     Types: Marijuana        Home meds:  Prior to Admission medications    Medication Sig Start Date End Date Taking? Authorizing Provider   folic acid (FOLVITE) 1 MG tablet Take 1 tablet by mouth Daily.  Patient not taking: Reported on 8/9/2024    Varsha Costello MD   furosemide (LASIX) 40 MG tablet Take 1 tablet by mouth Daily.  Patient not taking: Reported on 8/9/2024    Varsha Costello MD   gabapentin (NEURONTIN) 300 MG capsule Take 1 capsule by mouth 3 (Three) Times a Day.  Patient not taking: Reported on 8/9/2024    Varsha Costello MD   HYDROcodone-acetaminophen (NORCO) 5-325 MG per tablet Take 1 tablet by mouth Every 6 (Six) Hours As Needed.  Patient not taking: Reported on 8/9/2024    Varsha Costello MD   hydrOXYzine (ATARAX) 25 MG tablet Take 1 tablet by mouth 3 (Three) Times a Day As Needed for Itching.  Patient not taking: Reported on 8/9/2024    Varsha Costello MD   lactulose (CHRONULAC) 10 GM/15ML solution solution (encephalopathy)  7/22/24   Varsha Costello MD   mirtazapine (REMERON) 30 MG tablet 1 tablet.  Patient not taking: Reported on 8/9/2024 7/30/24   Varsha Costello MD   nicotine polacrilex (NICORETTE) 2 MG gum Take 1 each by mouth Every 2 (Two) Hours As Needed.  Patient not taking: Reported on 8/9/2024 7/30/24 10/28/24  Varsha Costello MD  "  oxyCODONE (OXY-IR) 5 MG capsule Take 1 capsule by mouth Every 4 (Four) Hours As Needed for Moderate Pain.  Patient not taking: Reported on 8/9/2024    Varsha Costello MD   pantoprazole (PROTONIX) 40 MG EC tablet Take 1 tablet by mouth 2 (Two) Times a Day.  Patient not taking: Reported on 8/9/2024    Varsha Costello MD   propranolol (INDERAL) 10 MG tablet Take 1 tablet by mouth 3 (Three) Times a Day.  Patient not taking: Reported on 8/9/2024    Varsha Costello MD   spironolactone (ALDACTONE) 100 MG tablet Take 1 tablet by mouth Daily.  Patient not taking: Reported on 8/9/2024    Varsha Costello MD       Allergies:  Patient has no known allergies.    Scheduled Meds:pantoprazole, 40 mg, Oral, Q AM      Continuous Infusions:   PRN Meds:.  [COMPLETED] Insert Peripheral IV **AND** sodium chloride      OBJECTIVE    Vital Signs  Vitals:    08/15/24 0031 08/15/24 0101 08/15/24 0123 08/15/24 0541   BP: 108/66 101/64  95/54   BP Location:    Right arm   Patient Position:    Lying   Pulse: 59 64 (!) 46 61   Resp:    13   Temp:    97.4 °F (36.3 °C)   TempSrc:    Oral   SpO2: 99% 96% 99% 99%   Weight:    81.6 kg (179 lb 14.3 oz)   Height:    165.1 cm (65\")       Flowsheet Rows      Flowsheet Row First Filed Value   Admission Height 170.2 cm (67\") Documented at 08/08/2024 2109   Admission Weight 93.4 kg (205 lb 14.6 oz) Documented at 08/08/2024 2109              Intake/Output Summary (Last 24 hours) at 8/15/2024 0628  Last data filed at 8/15/2024 0615  Gross per 24 hour   Intake 120 ml   Output --   Net 120 ml          Telemetry: Sinus rhythm/sinus bradycardia    Physical Exam:  The patient is alert, oriented and in no distress.  Obese  Vital signs as noted above.  Head and neck revealed no carotid bruits or jugular venous distention.  No thyromegaly or lymphadenopathy is present  Lungs clear.  No wheezing.  Breath sounds are normal bilaterally.  Heart normal first and second heart sounds.  No murmur. No " precordial rub is present.  No gallop is present.  Abdomen soft and nontender.  No organomegaly is present.  Extremities with good peripheral pulses without any pedal edema.  Skin warm and dry.  Musculoskeletal system is grossly normal.  CNS grossly normal.       Results Review:  I have personally reviewed the results from the time of this admission to 8/15/2024 06:28 EDT and agree with these findings:  []  Laboratory  []  Microbiology  []  Radiology  []  EKG/Telemetry   []  Cardiology/Vascular   []  Pathology  []  Old records  []  Other:    Most notable findings include:    Lab Results (last 24 hours)       Procedure Component Value Units Date/Time    Comprehensive Metabolic Panel [[id number]]  (Abnormal) Collected: 08/15/24 0325    Specimen: Blood Updated: 08/15/24 0352     Glucose 115 mg/dL      BUN 10 mg/dL      Creatinine 0.67 mg/dL      Sodium 138 mmol/L      Potassium 3.8 mmol/L      Chloride 106 mmol/L      CO2 24.5 mmol/L      Calcium 8.7 mg/dL      Total Protein 6.4 g/dL      Albumin 2.5 g/dL      ALT (SGPT) 48 U/L      AST (SGOT) 81 U/L      Alkaline Phosphatase 137 U/L      Total Bilirubin 3.3 mg/dL      Globulin 3.9 gm/dL      A/G Ratio 0.6 g/dL      BUN/Creatinine Ratio 14.9     Anion Gap 7.5 mmol/L      eGFR 110.0 mL/min/1.73     Narrative:      GFR Normal >60  Chronic Kidney Disease <60  Kidney Failure <15      CBC & Differential [[id number]]  (Abnormal) Collected: 08/15/24 0245    Specimen: Blood Updated: 08/15/24 0322    Narrative:      The following orders were created for panel order CBC & Differential.  Procedure                               Abnormality         Status                     ---------                               -----------         ------                     CBC Auto Differential[[id number]]        Abnormal            Final result               Scan Slide[[id number]]                                       Final result                 Please view results for these tests on the  individual orders.    CBC Auto Differential [[id number]]  (Abnormal) Collected: 08/15/24 0245    Specimen: Blood Updated: 08/15/24 0322     WBC 3.80 10*3/mm3      RBC 2.75 10*6/mm3      Hemoglobin 9.5 g/dL      Hematocrit 30.7 %      .6 fL      MCH 34.5 pg      MCHC 30.9 g/dL      RDW 13.8 %      RDW-SD 56.7 fl      MPV 11.4 fL      Platelets 76 10*3/mm3     Scan Slide [[id number]] Collected: 08/15/24 0245    Specimen: Blood Updated: 08/15/24 0322     Scan Slide --     Comment: See Manual Differential Results       Manual Differential [[id number]]  (Abnormal) Collected: 08/15/24 0245    Specimen: Blood Updated: 08/15/24 0322     Neutrophil % 42.0 %      Lymphocyte % 41.0 %      Monocyte % 7.0 %      Eosinophil % 4.0 %      Basophil % 1.0 %      Bands %  2.0 %      Metamyelocyte % 1.0 %      Atypical Lymphocyte % 2.0 %      Neutrophils Absolute 1.67 10*3/mm3      Lymphocytes Absolute 1.63 10*3/mm3      Monocytes Absolute 0.27 10*3/mm3      Eosinophils Absolute 0.15 10*3/mm3      Basophils Absolute 0.04 10*3/mm3      Anisocytosis Slight/1+     Crenated RBC's Slight/1+     Macrocytes Slight/1+     Poikilocytes Slight/1+     WBC Morphology Normal     Platelet Estimate Decreased    COVID-19, FLU A/B, RSV PCR 1 HR TAT - Swab, Nasopharynx [[id number]]  (Normal) Collected: 08/14/24 2317    Specimen: Swab from Nasopharynx Updated: 08/14/24 2358     COVID19 Not Detected     Influenza A PCR Not Detected     Influenza B PCR Not Detected     RSV, PCR Not Detected    Narrative:      Fact sheet for providers: https://www.fda.gov/media/030383/download    Fact sheet for patients: https://www.fda.gov/media/566110/download    Test performed by PCR.    High Sensitivity Troponin T 2Hr [[id number]] Collected: 08/14/24 2233    Specimen: Blood Updated: 08/14/24 2301     HS Troponin T <6 ng/L      Troponin T Delta --     Comment: Unable to calculate.       Narrative:      High Sensitive Troponin T Reference Range:  <14.0 ng/L- Negative  Female for AMI  <22.0 ng/L- Negative Male for AMI  >=14 - Abnormal Female indicating possible myocardial injury.  >=22 - Abnormal Male indicating possible myocardial injury.   Clinicians would have to utilize clinical acumen, EKG, Troponin, and serial changes to determine if it is an Acute Myocardial Infarction or myocardial injury due to an underlying chronic condition.         CBC & Differential [[id number]]  (Abnormal) Collected: 08/14/24 1925    Specimen: Blood from Arm, Left Updated: 08/14/24 2017    Narrative:      The following orders were created for panel order CBC & Differential.  Procedure                               Abnormality         Status                     ---------                               -----------         ------                     CBC Auto Differential[[id number]]        Abnormal            Final result               Scan Slide[[id number]]                                       Final result                 Please view results for these tests on the individual orders.    Scan Slide [[id number]] Collected: 08/14/24 1925    Specimen: Blood from Arm, Left Updated: 08/14/24 2017     Scan Slide --     Comment: See Manual Differential Results       Manual Differential [[id number]]  (Abnormal) Collected: 08/14/24 1925    Specimen: Blood from Arm, Left Updated: 08/14/24 2017     Neutrophil % 44.0 %      Lymphocyte % 35.0 %      Monocyte % 15.0 %      Eosinophil % 2.0 %      Bands %  2.0 %      Atypical Lymphocyte % 2.0 %      Neutrophils Absolute 2.10 10*3/mm3      Lymphocytes Absolute 1.69 10*3/mm3      Monocytes Absolute 0.68 10*3/mm3      Eosinophils Absolute 0.09 10*3/mm3      Anisocytosis Slight/1+     Macrocytes Slight/1+     WBC Morphology Normal     Platelet Estimate Decreased    CBC Auto Differential [[id number]]  (Abnormal) Collected: 08/14/24 1925    Specimen: Blood from Arm, Left Updated: 08/14/24 2017     WBC 4.56 10*3/mm3      RBC 2.90 10*6/mm3      Hemoglobin 9.9 g/dL      Hematocrit  31.2 %      .6 fL      MCH 34.1 pg      MCHC 31.7 g/dL      RDW 13.8 %      RDW-SD 54.8 fl      MPV 10.8 fL      Platelets 86 10*3/mm3     Narrative:      The previously reported component NRBC is no longer being reported. Previous result was 0.0 /100 WBC (Reference Range: 0.0-0.2 /100 WBC) on 8/14/2024 at 1948 EDT.    Ammonia [[id number]]  (Abnormal) Collected: 08/14/24 1936    Specimen: Blood Updated: 08/14/24 2004     Ammonia 82 umol/L     Comprehensive Metabolic Panel [[id number]]  (Abnormal) Collected: 08/14/24 1925    Specimen: Blood from Arm, Left Updated: 08/14/24 2004     Glucose 126 mg/dL      BUN 11 mg/dL      Creatinine 0.69 mg/dL      Sodium 136 mmol/L      Potassium 4.0 mmol/L      Chloride 102 mmol/L      CO2 25.4 mmol/L      Calcium 9.1 mg/dL      Total Protein 6.9 g/dL      Albumin 2.6 g/dL      ALT (SGPT) 51 U/L      AST (SGOT) 89 U/L      Alkaline Phosphatase 146 U/L      Total Bilirubin 3.7 mg/dL      Globulin 4.3 gm/dL      A/G Ratio 0.6 g/dL      BUN/Creatinine Ratio 15.9     Anion Gap 8.6 mmol/L      eGFR 109.2 mL/min/1.73     Narrative:      GFR Normal >60  Chronic Kidney Disease <60  Kidney Failure <15      BNP [[id number]]  (Normal) Collected: 08/14/24 1925    Specimen: Blood from Arm, Left Updated: 08/14/24 2004     proBNP 122.0 pg/mL     Narrative:      This assay is used as an aid in the diagnosis of individuals suspected of having heart failure. It can be used as an aid in the diagnosis of acute decompensated heart failure (ADHF) in patients presenting with signs and symptoms of ADHF to the emergency department (ED). In addition, NT-proBNP of <300 pg/mL indicates ADHF is not likely.    Age Range Result Interpretation  NT-proBNP Concentration (pg/mL:      <50             Positive            >450                   Gray                 300-450                    Negative             <300    50-75           Positive            >900                  Gray                300-900             "      Negative            <300      >75             Positive            >1800                  Gray                300-1800                  Negative            <300    High Sensitivity Troponin T [[id number]]  (Normal) Collected: 08/14/24 1925    Specimen: Blood from Arm, Left Updated: 08/14/24 2004     HS Troponin T <6 ng/L     Narrative:      High Sensitive Troponin T Reference Range:  <14.0 ng/L- Negative Female for AMI  <22.0 ng/L- Negative Male for AMI  >=14 - Abnormal Female indicating possible myocardial injury.  >=22 - Abnormal Male indicating possible myocardial injury.   Clinicians would have to utilize clinical acumen, EKG, Troponin, and serial changes to determine if it is an Acute Myocardial Infarction or myocardial injury due to an underlying chronic condition.         D-dimer, Quantitative [[id number]]  (Abnormal) Collected: 08/14/24 1925    Specimen: Blood from Arm, Left Updated: 08/14/24 1953     D-Dimer, Quantitative 1.42 mg/L (FEU)     Narrative:      According to the assay 's published package insert, a normal (<0.50 mg/L (FEU)) D-dimer result in conjunction with a non-high clinical probability assessment, excludes deep vein thrombosis (DVT) and pulmonary embolism (PE) with high sensitivity.    D-dimer values increase with age and this can make VTE exclusion of an older population difficult. To address this, the [employer], based on best available evidence and recent guidelines, recommends that clinicians use age-adjusted D-dimer thresholds in patients greater than 50 years of age with: a) a low probability of PE who do not meet all Pulmonary Embolism Rule Out Criteria, or b) in those with intermediate probability of PE.   The formula for an age-adjusted D-dimer cut-off is \"age/100\".  For example, a 60 year old patient would have an age-adjusted cut-off of 0.60 mg/L (FEU) and an 80 year old 0.80 mg/L (FEU).    Extra Tubes [[id number]] Collected: 08/14/24 1925    " Specimen: Blood from Arm, Left Updated: 08/14/24 1946    Narrative:      The following orders were created for panel order Extra Tubes.  Procedure                               Abnormality         Status                     ---------                               -----------         ------                     Gold Top - SST[[id number]]                                   Final result                 Please view results for these tests on the individual orders.    Gold Top - SST [[id number]] Collected: 08/14/24 1925    Specimen: Blood from Arm, Left Updated: 08/14/24 1946     Extra Tube Hold for add-ons.     Comment: Auto resulted.               Imaging Results (Last 24 Hours)       Procedure Component Value Units Date/Time    CT Angiogram Chest Pulmonary Embolism [[id number]] Collected: 08/14/24 2148     Updated: 08/14/24 2159    Narrative:      CT ANGIOGRAM CHEST PULMONARY EMBOLISM    Date of Exam: 8/14/2024 9:08 PM EDT    Indication: sob. Elevated D-dimer    Comparison: CT abdomen and pelvis with contrast 8/9/2024    Technique: Axial CT images were obtained of the chest after the uneventful intravenous administration of iodinated contrast utilizing pulmonary embolism protocol.  Sagittal and coronal reconstructions were performed.  Automated exposure control and   iterative reconstruction methods were used.    Findings:  Visualized soft tissues of the lower neck are without acute abnormality. The heart is mildly enlarged. Aortic valve calcifications. Negative for aortic aneurysm. Coronary artery calcifications are present. No pericardial effusion. No pleural effusion.   Pulmonary arteries are well-opacified with contrast. Negative for pulmonary embolus. No mediastinal or hilar adenopathy. No axillary adenopathy.    Trachea and mainstem bronchi are patent. Emphysema. No pneumothorax. Minimal dependent basilar atelectasis. No findings of pneumonia. No suspicious pulmonary nodule. Calcified left lower lobe  granuloma.    Upper abdomen demonstrates small perihepatic ascites. Micronodular cirrhotic liver morphology. Mild splenomegaly unchanged. Normal adrenal glands. Large amount of stool in the colon at the hepatic flexure partially imaged. Upper abdominal aortic branch   vasculature patent. No aggressive osseous lesion.      Impression:      Impression:  1. Negative for pulmonary embolus.  2. No acute intrathoracic process.  3. Suspected cirrhotic liver with small upper abdominal ascites and splenomegaly.  4. Emphysema, coronary artery calcifications, and additional chronic findings above.        Electronically Signed: Sage Sharma MD    8/14/2024 9:57 PM EDT    Workstation ID: [id number]    XR Chest 1 View [[id number]] Collected: 08/14/24 2008     Updated: 08/14/24 2011    Narrative:      XR CHEST 1 VW    Date of Exam: 8/14/2024 7:45 PM EDT    Indication: sob    Comparison: CT abdomen pelvis 8/9/2024    Findings:  Elevated right diaphragm. Cardiomegaly. Left lower lobe airspace opacity atelectasis versus pneumonia similar to previous study. No definite pleural effusion. Pulmonary vessels are distinct.      Impression:      Impression:  No change from CT abdomen pelvis appearance of chest. Left lower lobe airspace opacity atelectasis versus pneumonia.      Electronically Signed: Karen Salgado MD    8/14/2024 8:09 PM EDT    Workstation ID: [id number]            LAB RESULTS (LAST 7 DAYS)    CBC  Results from last 7 days   Lab Units 08/15/24  0245 08/14/24  1925 08/11/24  0617 08/10/24  0605 08/09/24  0546 08/08/24  2329   WBC 10*3/mm3 3.80 4.56 6.75 5.17 3.21* 4.43   RBC 10*6/mm3 2.75* 2.90* 2.78* 2.56* 2.35* 2.54*   HEMOGLOBIN g/dL 9.5* 9.9* 9.7* 9.0* 8.2* 9.2*   HEMATOCRIT % 30.7* 31.2* 30.3* 27.9* 26.4* 28.3*   MCV fL 111.6* 107.6* 109.0* 109.0* 112.3* 111.4*   PLATELETS 10*3/mm3 76* 86* 89* 71* 63* 74*       BMP  Results from last 7 days   Lab Units 08/15/24  0325 08/14/24  1925 08/12/24  0325 08/11/24  0617 08/10/24  2315  08/10/24  0605 08/09/24  1355 08/09/24  0546 08/08/24  2329   SODIUM mmol/L 138 136 134* 136  --  137  --  137 137   POTASSIUM mmol/L 3.8 4.0 3.5 3.9 4.0 3.5  --  3.9 4.2   CHLORIDE mmol/L 106 102 99 103  --  107  --  109* 106   CO2 mmol/L 24.5 25.4 27.1 23.5  --  23.0  --  20.7* 21.4*   BUN mg/dL 10 11 8 10  --  12  --  12 9   CREATININE mg/dL 0.67 0.69 0.76 0.72  --  0.71  --  0.90 0.86   GLUCOSE mg/dL 115* 126* 104* 86  --  103*  --  97 97   MAGNESIUM mg/dL  --   --   --   --   --   --  1.6  --   --        CMP   Results from last 7 days   Lab Units 08/15/24  0325 08/14/24  1936 08/14/24  1925 08/12/24 0325 08/11/24  0617 08/10/24  2317 08/10/24  0605 08/09/24  1355 08/09/24  0546 08/08/24  2329   SODIUM mmol/L 138  --  136 134* 136  --  137  --  137 137   POTASSIUM mmol/L 3.8  --  4.0 3.5 3.9 4.0 3.5  --  3.9 4.2   CHLORIDE mmol/L 106  --  102 99 103  --  107  --  109* 106   CO2 mmol/L 24.5  --  25.4 27.1 23.5  --  23.0  --  20.7* 21.4*   BUN mg/dL 10  --  11 8 10  --  12  --  12 9   CREATININE mg/dL 0.67  --  0.69 0.76 0.72  --  0.71  --  0.90 0.86   GLUCOSE mg/dL 115*  --  126* 104* 86  --  103*  --  97 97   ALBUMIN g/dL 2.5*  --  2.6* 2.6* 2.5*  --  2.4* 2.6*  --  2.8*   BILIRUBIN mg/dL 3.3*  --  3.7* 3.7* 3.8*  --  3.1* 3.8*  --  3.9*   ALK PHOS U/L 137*  --  146* 149* 158*  --  150* 131*  --  166*   AST (SGOT) U/L 81*  --  89* 104* 107*  --  102* 103*  --  121*   ALT (SGPT) U/L 48*  --  51* 54* 54*  --  51* 53*  --  61*   LIPASE U/L  --   --   --   --   --   --   --   --   --  57   AMMONIA umol/L  --  82*  --   --   --   --   --   --  125*  --        BNP        TROPONIN  Results from last 7 days   Lab Units 08/14/24  2233   HSTROP T ng/L <6       CoAg  Results from last 7 days   Lab Units 08/11/24  0617 08/09/24  1041   INR  1.49* 1.50*       Creatinine Clearance  Estimated Creatinine Clearance: 111.8 mL/min (by C-G formula based on SCr of 0.67 mg/dL).    ABG        Radiology  CT Angiogram Chest Pulmonary  Embolism    Result Date: 8/14/2024  Impression: 1. Negative for pulmonary embolus. 2. No acute intrathoracic process. 3. Suspected cirrhotic liver with small upper abdominal ascites and splenomegaly. 4. Emphysema, coronary artery calcifications, and additional chronic findings above. Electronically Signed: Sage Sharma MD  8/14/2024 9:57 PM EDT  Workstation ID: [id number]    XR Chest 1 View    Result Date: 8/14/2024  Impression: No change from CT abdomen pelvis appearance of chest. Left lower lobe airspace opacity atelectasis versus pneumonia. Electronically Signed: Karen Salgado MD  8/14/2024 8:09 PM EDT  Workstation ID: [id number]       EKG  I personally viewed and interpreted the patient's EKG/Telemetry data:  ECG 12 Lead Chest Pain   Preliminary Result   HEART RATE=44  bpm   RR Hwghqtkf=8097  ms   TX Sstvihxu=962  ms   P Horizontal Axis=29  deg   P Front Axis=14  deg   QRSD Yzdvrrfg=734  ms   QT Lsbeqrqa=631  ms   IAfG=722  ms   QRS Axis=38  deg   T Wave Axis=37  deg   - BORDERLINE ECG -   Bradycardia with irregular rate   Date and Time of Study:2024-08-14 19:12:50      Telemetry Scan   Final Result            Echocardiogram:    Results for orders placed during the hospital encounter of 08/08/24    Adult Transthoracic Echo Complete W/ Cont if Necessary Per Protocol    Interpretation Summary    Left ventricular systolic function is normal. Calculated left ventricular 3D EF = 61% Left ventricular ejection fraction appears to be 61 - 65%.    Left ventricular diastolic function is consistent with (grade II w/high LAP) pseudonormalization.    Mild to moderate aortic valve stenosis is present. Mean/peak gradient of 17/31 mmHg.  Aortic valve area 1.5 cm²    Estimated right ventricular systolic pressure from tricuspid regurgitation is normal (<35 mmHg).        Stress Test:         Cardiac Catheterization:  No results found for this or any previous visit.         Other:         ASSESSMENT & PLAN:    Principal Problem:     Bradycardia  Active Problems:    Alcoholic cirrhosis of liver with ascites    Alcohol abuse    Chronic GERD    Major depressive disorder, recurrent episode, moderate degree    Tobacco abuse    Thrombocytopenia    Macrocytic anemia    Hyperammonemia    Arrhythmia/bradycardia  Questionable paroxysmal A-fib with slow ventricular rate  She remained in sinus rhythm throughout the previous hospital admission  Bradycardia is secondary to underlying liver disease.  Echocardiogram with preserved LV function, grade 2 diastolic dysfunction  TSH is normal  Unable to tolerate beta-blocker due to bradycardia  Good chronotropic response as assessed at bedside.  Heart rate increases to 70s and 80s with minimal movement.  Unable to tolerate anticoagulation due to esophageal varices, anemia, thrombocytopenia and high risk of bleeding.  14 days Holter monitor at the time of discharge.      Alcoholic cirrhosis of liver with ascites and Esophageal varices / Alcohol abuse  Anasarca   History of large volume paracentesis in the past  Ceftriaxone for SBP  Albumin and Lasix  On lactulose and rifaximin for hyperammonemia      Hypotension  Likely secondary to liver disease and possible sepsis  Currently on midodrine with normal blood pressure  Albumin replacement  Will closely monitor blood pressure    Thrombocytopenia / Macrocytic anemia  Likely secondary to chronic liver disease  Platelets 76 and hemoglobin 9.5 today  INR is 1.49  Monitor CBC  Monitor for signs of bleeding     Chronic hepatitis C virus infection  Unsure if patient has received treatment in the past     Tobacco abuse  Smoking cessation counseling provided to the patient      Jayson Corbin MD  08/15/24  06:28 EDT                  Electronically signed by Jayson Corbin MD at 08/15/24 0805

## 2024-08-15 NOTE — CASE MANAGEMENT/SOCIAL WORK
Social Work Assessment   [facility]     Patient Name: Maria Elena Byrnes  MRN: [id number]  Today's Date: 8/15/2024    Admit Date: 8/14/2024     Discharge Needs Assessment       Row Name 08/15/24 1002       Living Environment    People in Home alone    Unique Family Situation unhoused, from [facility]    Current Living Arrangements homeless    Duration at Residence n/a    Potentially Unsafe Housing Conditions unable to assess  unhoused    In the past 12 months has the electric, gas, oil, or water company threatened to shut off services in your home? No  unhoused last year    Primary Care Provided by self    Provides Primary Care For no one, unable/limited ability to care for self    Family Caregiver if Needed child(emilia), adult;significant other    Family Caregiver Names S/o (Nick), Daughter (Kandy)    Quality of Family Relationships unable to assess    Able to Return to Prior Arrangements yes       Resource/Environmental Concerns    Resource/Environmental Concerns financial    Financial Concerns rent or mortgage, unable to afford;unemployed    Transportation Concerns no car       Transportation Needs    In the past 12 months, has lack of transportation kept you from medical appointments or from getting medications? yes    In the past 12 months, has lack of transportation kept you from meetings, work, or from getting things needed for daily living? Yes       Food Insecurity    Within the past 12 months, you worried that your food would run out before you got the money to buy more. Often true    Within the past 12 months, the food you bought just didn't last and you didn't have money to get more. Often true       Transition Planning    Patient/Family Anticipates Transition to other (see comments)  Return to [facility]    Patient/Family Anticipated Services at Transition rehabilitation services    Transportation Anticipated health plan transportation;other (see comments)  Facility transport       Discharge Needs  Assessment    Readmission Within the Last 30 Days current reason for admission unrelated to previous admission    Equipment Currently Used at Home none    Concerns to be Addressed substance/tobacco abuse/use;care coordination/care conferences;discharge planning    Anticipated Changes Related to Illness none    Equipment Needed After Discharge none    Discharge Facility/Level of Care Needs substance abuse facility    Current Discharge Risk financial support inadequate;homeless;lack of support system/caregiver;substance use/abuse                   Discharge Plan       Row Name 08/15/24 1017       Plan    Plan Anticipate return to [facility] pending clinical review/confirmation, see comments.    Patient/Family in Agreement with Plan yes    Plan Comments Met with patient at bedside for LACE/discharge planning/SDOH/substance abuse screens. Patient affirms she is from [facility] [place] and is in the area for treatment at [facility] for ETOH abuse. Plan is to return to facility upon hospital discharge. States she was only back for 2-3 hours when they did her assessment and she was sent back out. Patient participation in screening limited d/t preferring to sleep at time of LSW visit. Patient previously states she has a PCP of Dr. Matt during previous admission, but affirms she hasn't been there in a long time nor does she know if she is a client still. Patient needs to establish PCP services upon returning to [place] as her intent is to return. Patient pharmacy is B enrollment as she is not local to the area. DME: none. Patient recent admission, 8/8-8/13, and returned shortly after return. LSW contacted [facility] ([phone]) and spoke to admissions who stated patient is unable to return. LSW inquired if this was accurate, who would be assisting with her return for belongings and transport back to [place]. Admissions rep stated they would transfer call to [facility] directly, where LSW was transferred to  medical, left a voicemail. Reached out to [facility] liaison (Radha Mcdermott) who contacted location directly. Liaison states they are requesting clinical and intent is to accept back if able to meet her needs, however, if unable to accept back, facility will take care of getting her to her belongings and back to [place], this will not be the responsibility of the hospital. Clinicals faxed.                  Continued Care and Services - Admitted Since 8/14/2024       Destination       Service Provider Request Status Selected Services Address Phone Fax Patient Preferred    [facility] Pending - Request Sent N/A [address] [place] [address] [phone] --                   Demographic Summary       Row Name 08/15/24 0942       General Information    Admission Type inpatient    Arrived From emergency department    Referral Source admission list    Reason for Consult care coordination/care conference;discharge planning;substance use concerns;housing concerns/homeless;community resources;financial concerns    Preferred Language English       Contact Information    Permission Granted to Share Info With                    Functional Status       Row Name 08/15/24 0943       Functional Status    Usual Activity Tolerance moderate    Current Activity Tolerance moderate       Physical Activity    On average, how many days per week do you engage in moderate to strenuous exercise (like a brisk walk)? 0 days    On average, how many minutes do you engage in exercise at this level? 0 min    Number of minutes of exercise per week 0       Functional Status, IADL    Medications independent    Meal Preparation independent    Housekeeping independent    Laundry independent    Shopping independent       Mental Status    General Appearance WDL WDL       Employment/    Employment Status unemployed    Current or Previous Occupation not applicable                   08/15/24 5417    Employment   Do you want help finding or keeping work or a job? I do not need or want help   Family and Community Support   If for any reason you need help with day-to-day activities such as bathing, preparing meals, shopping, managing finances, etc., do you get the help you need? I could use a little more help   How often do you feel lonely or isolated from those around you? Sometimes   Education   Do you want help with school or training? For example, starting or completing job training or getting a high school diploma, GED or equivalent No     HEATH Metzger, ALEXEY, [facility]    Phone: [phone]  Cell: [phone]  Fax: [phone]  [email or web address]

## 2024-08-15 NOTE — PROGRESS NOTES
[facility] MEDICINE SERVICE  DAILY PROGRESS NOTE    NAME: Maria Elena Byrnes  : 1980  MRN: [id number]      LOS: 0 days     PROVIDER OF SERVICE: Shay Thornton MD    Chief Complaint: Bradycardia    Subjective:     Interval History: Patient states that her breathing is somewhat better but she still feels short of breath with short distance ambulation.    Review of Systems:   Review of Systems    Objective:     Vital Signs  Temp:  [97.4 °F (36.3 °C)-98.3 °F (36.8 °C)] 98.3 °F (36.8 °C)  Heart Rate:  [40-76] 63  Resp:  [12-20] 17  BP: ()/(47-77) 83/47   Body mass index is 29.94 kg/m².    Physical Exam  Physical Exam  General Appearance:  Alert, cooperative, no distress, appears stated age  Head:  Normocephalic, without obvious abnormality, atraumatic  Eyes:  PERRL, conjunctiva/corneas clear, EOM's intact, fundi benign, both eyes  Ears:  Normal TM's and external ear canals, both ears  Nose: Nares normal, septum midline, mucosa normal, no drainage or sinus tenderness  Throat: Lips, mucosa, and tongue normal; teeth and gums normal  Neck: Supple, symmetrical, trachea midline, no adenopathy, thyroid: not enlarged, symmetric, no tenderness/mass/nodules, no carotid bruit or JVD  Lungs:   Clear to auscultation bilaterally, respirations unlabored  Heart:  Regular rate and rhythm, S1, S2 normal, no murmur, rub or gallop  Abdomen:  Soft, non-tender, bowel sounds active all four quadrants,  no masses, no organomegaly  Extremities: Extremities normal, atraumatic, no cyanosis or edema  Pulses: 2+ and symmetric  Skin: Skin color, texture, turgor normal, no rashes or lesions  Neurologic: Normal      Scheduled Meds   dicyclomine, 20 mg, Oral, TID  folic acid, 1 mg, Oral, Daily  lactulose, 30 g, Oral, BID  midodrine, 10 mg, Oral, Q8H  pantoprazole, 40 mg, Oral, Q AM  riFAXIMin, 550 mg, Oral, Q12H  ursodiol, 300 mg, Oral, BID       PRN Meds     docusate sodium    [COMPLETED] Insert Peripheral IV **AND** sodium chloride     tiZANidine    traMADol    traZODone   Infusions         Diagnostic Data    Results from last 7 days   Lab Units 08/15/24  0325 08/15/24  0245   WBC 10*3/mm3  --  3.80   HEMOGLOBIN g/dL  --  9.5*   HEMATOCRIT %  --  30.7*   PLATELETS 10*3/mm3  --  76*   GLUCOSE mg/dL 115*  --    CREATININE mg/dL 0.67  --    BUN mg/dL 10  --    SODIUM mmol/L 138  --    POTASSIUM mmol/L 3.8  --    AST (SGOT) U/L 81*  --    ALT (SGPT) U/L 48*  --    ALK PHOS U/L 137*  --    BILIRUBIN mg/dL 3.3*  --    ANION GAP mmol/L 7.5  --        CT Angiogram Chest Pulmonary Embolism    Result Date: 8/14/2024  Impression: 1. Negative for pulmonary embolus. 2. No acute intrathoracic process. 3. Suspected cirrhotic liver with small upper abdominal ascites and splenomegaly. 4. Emphysema, coronary artery calcifications, and additional chronic findings above. Electronically Signed: Sage Sharma MD  8/14/2024 9:57 PM EDT  Workstation ID: [id number]    XR Chest 1 View    Result Date: 8/14/2024  Impression: No change from CT abdomen pelvis appearance of chest. Left lower lobe airspace opacity atelectasis versus pneumonia. Electronically Signed: Karen Salgado MD  8/14/2024 8:09 PM EDT  Workstation ID: [id number]       I reviewed the patient's new clinical results.    Assessment/Plan:     Active and Resolved Problems  Active Hospital Problems    Diagnosis  POA    **Bradycardia [R00.1]  Yes    Hyperammonemia [E72.20]  Yes    Thrombocytopenia [D69.6]  Yes    Macrocytic anemia [D53.9]  Yes    Chronic GERD [K21.9]  Yes    Alcoholic cirrhosis of liver with ascites [K70.31]  Yes    Major depressive disorder, recurrent episode, moderate degree [F33.1]  Yes    Alcohol abuse [F10.10]  Yes    Tobacco abuse [Z72.0]  Yes      Resolved Hospital Problems   No resolved problems to display.       Symptomatic bradycardia  -Patient presented with bradycardia, concerning for possible slow A-fib versus sinus bradycardia  -Patient was given 1 dose of atropine in the ER, however  currently her heart rate remains controlled and does improve with minimal ambulation  -Hold any beta-blocker therapy  -Cardiology consult appreciated  -Check echocardiogram    Acute hyperammonemia  -Resume home lactulose dose    Alcoholic cirrhosis with ascites  Patient appears to be fairly euvolemic and no evidence of large volume ascites on imaging    Thrombocytopenia with macrocytic anemia  -Secondary to alcoholic cirrhosis  -Stable    GERD with esophageal varices  -Continue PPI    VTE Prophylaxis:  Mechanical VTE prophylaxis orders are present.         Code status is   Code Status and Medical Interventions: CPR (Attempt to Resuscitate); Full Support   Ordered at: 08/14/24 2236     Code Status (Patient has no pulse and is not breathing):    CPR (Attempt to Resuscitate)     Medical Interventions (Patient has pulse or is breathing):    Full Support       Plan for disposition: DC on 8/16    Time: 30 minutes    Signature: Electronically signed by Shay Thornton MD, 08/15/24, 15:15 EDT.  [facility] Hospitalist Team

## 2024-08-15 NOTE — H&P
[facility] [address] Medicine Services  History & Physical    Patient Name: Maria Elena Byrnes  : 1980  MRN: [id number]  Primary Care Physician:  Provider, No Known  Date of admission: 2024  Date and Time of Service: 2024 at 2300    Subjective      Chief Complaint: shortness of air     History of Present Illness: Maria Elena Byrnes is a 43 y.o. female with a CMH of alcoholic cirrhosis frequent ascites, chronic hepatitis C, chronically elevated LFTs, anemia, thrombocytopenia, GERD with a history of esophageal varices, major depressive disorder who presented to [facility] on 2024 with complaints of shortness of breath.  Denies any chest pain, cough congestion or fever.  She is originally from the [place] area but is in a local alcohol rehab facility called [facility].  She was just discharged from this hospital yesterday after  4 days for an admission for abdominal pain and edema in the abdomen and bilateral lower extremities.  She was followed by gastroenterology here as well as cardiology diagnosed questionable paroxysmal atrial fibrillation with slow ventricular rate.  Not been put on anticoagulation due to esophageal varices cannot be put on beta-blockers due to bradycardia.  Echocardiogram during that admission showed preserved left ventricular function grade 2 diastolic dysfunction.  She is stable on room air.  However while in the emergency department it was noted her heart rate was in the high 30s to low 40s which necessitated giving her 0.5 mg atropine.  Labs today show troponin less than 6 proBNP 122, LFTs are elevated but appears to be at baseline with alk phos 146 AST 89 ALT 51 total bilirubin 3.7 albumin chronically low at 2.6 and stable,  ammonia was 82 and she was given lactulose in the emergency department.  Lethargic but awake alert and answering appropriately.  Complains of abdominal cramping but denies any nausea vomiting or diarrhea.  Hemoglobin 9.9 stable WBC not elevated, D-dimer  elevated at 1.42 CTA chest per radiology shows no pulmonary embolism no acute cardio thoracic process suspected cirrhotic liver with small upper abdominal ascites and splenomegaly and emphysema coronary artery calcifications. She will be admitted for continuous cardiac monitoring and cardiology has been consulted.      Review of Systems   Constitutional: Negative.    HENT: Negative.     Eyes: Negative.    Respiratory:  Positive for shortness of breath.    Cardiovascular: Negative.    Gastrointestinal:  Positive for abdominal pain.        Cramping   Endocrine: Negative.    Genitourinary: Negative.    Musculoskeletal: Negative.    Skin: Negative.    Allergic/Immunologic: Negative.    Neurological: Negative.    Hematological: Negative.    Psychiatric/Behavioral:  Positive for behavioral problems.    All other systems reviewed and are negative.      Personal History     Past Medical History:   Diagnosis Date    Alcohol abuse 11/08/2022    Anemia of chronic disease 07/29/2024    Chronic GERD 08/09/2024    Chronic hepatitis C virus infection 11/08/2022    HAVP Negative 11/03/2022     HEPBSAG Negative 11/03/2022     HEPBCAB Negative 11/03/2022     HEPC10 Positive (A) 11/03/2022             Cirrhosis     Colitis 07/17/2024    Elevated LFTs 11/06/2019    Esophageal varices     Lesion of vocal fold 07/29/2024    Major depressive disorder, recurrent episode, moderate degree 02/17/2024    Neuropathy 08/09/2024    Pelvic congestion syndrome 07/28/2023    Portal vein thrombosis 07/11/2024    Sepsis without acute organ dysfunction 11/03/2022       History reviewed. No pertinent surgical history.    Family History: Family history is unknown by patient. Otherwise pertinent FHx was reviewed and not pertinent to current issue.    Social History:  reports that she has been smoking cigarettes. She uses smokeless tobacco. She reports current alcohol use. She reports current drug use. Drug: Marijuana.    Home Medications:  Prior to  Admission Medications       Prescriptions Last Dose Informant Patient Reported? Taking?    folic acid (FOLVITE) 1 MG tablet   No No    Take 1 tablet by mouth Daily.    lactulose (CHRONULAC) 10 GM/15ML solution   No No    Take 45 mL by mouth 2 (Two) Times a Day.    midodrine (PROAMATINE) 10 MG tablet   No No    Take 1 tablet by mouth Every 8 (Eight) Hours.    riFAXIMin (XIFAXAN) 550 MG tablet   No No    Take 1 tablet by mouth Every 12 (Twelve) Hours for 30 days. Indications: Impaired Brain Function due to Liver Disease    spironolactone (ALDACTONE) 100 MG tablet   No No    Take 1 tablet by mouth Daily.    Thiamine Mononitrate 100 MG tablet   No No    Take 1 tablet by mouth Daily.    ursodiol (ACTIGALL) 300 MG capsule   No No    Take 1 capsule by mouth 2 (Two) Times a Day.              Allergies:  No Known Allergies    Objective      Vitals:   Temp:  [97.4 °F (36.3 °C)-98.2 °F (36.8 °C)] 98.1 °F (36.7 °C)  Heart Rate:  [40-59] 55  Resp:  [12-20] 12  BP: ()/(61-77) 120/77  Body mass index is 28.76 kg/m².  Physical Exam  Vitals reviewed.   Constitutional:       Appearance: She is ill-appearing.   HENT:      Head: Normocephalic and atraumatic.      Right Ear: External ear normal.      Left Ear: External ear normal.      Nose: Nose normal.      Mouth/Throat:      Mouth: Mucous membranes are moist.   Eyes:      Extraocular Movements: Extraocular movements intact.   Cardiovascular:      Rate and Rhythm: Regular rhythm. Bradycardia present.      Pulses: Normal pulses.      Heart sounds: Normal heart sounds.   Pulmonary:      Effort: Pulmonary effort is normal.      Breath sounds: Normal breath sounds.   Abdominal:      General: There is distension.      Palpations: Abdomen is soft.   Genitourinary:     Comments: deferred  Musculoskeletal:         General: Normal range of motion.      Cervical back: Normal range of motion and neck supple.   Skin:     General: Skin is warm and dry.      Coloration: Skin is jaundiced.    Neurological:      General: No focal deficit present.      Mental Status: She is alert and oriented to person, place, and time.   Psychiatric:         Mood and Affect: Mood normal.         Behavior: Behavior normal.         Thought Content: Thought content normal.         Judgment: Judgment normal.         Diagnostic Data:            Lab Results (last 24 hours)       Procedure Component Value Units Date/Time    High Sensitivity Troponin T 2Hr [[id number]] Collected: 08/14/24 2233    Specimen: Blood Updated: 08/14/24 2301     HS Troponin T <6 ng/L      Troponin T Delta --     Comment: Unable to calculate.       Narrative:      High Sensitive Troponin T Reference Range:  <14.0 ng/L- Negative Female for AMI  <22.0 ng/L- Negative Male for AMI  >=14 - Abnormal Female indicating possible myocardial injury.  >=22 - Abnormal Male indicating possible myocardial injury.   Clinicians would have to utilize clinical acumen, EKG, Troponin, and serial changes to determine if it is an Acute Myocardial Infarction or myocardial injury due to an underlying chronic condition.         CBC & Differential [[id number]]  (Abnormal) Collected: 08/14/24 1925    Specimen: Blood from Arm, Left Updated: 08/14/24 2017    Narrative:      The following orders were created for panel order CBC & Differential.  Procedure                               Abnormality         Status                     ---------                               -----------         ------                     CBC Auto Differential[[id number]]        Abnormal            Final result               Scan Slide[[id number]]                                       Final result                 Please view results for these tests on the individual orders.    Scan Slide [[id number]] Collected: 08/14/24 1925    Specimen: Blood from Arm, Left Updated: 08/14/24 2017     Scan Slide --     Comment: See Manual Differential Results       Manual Differential [[id number]]  (Abnormal) Collected: 08/14/24  1925    Specimen: Blood from Arm, Left Updated: 08/14/24 2017     Neutrophil % 44.0 %      Lymphocyte % 35.0 %      Monocyte % 15.0 %      Eosinophil % 2.0 %      Bands %  2.0 %      Atypical Lymphocyte % 2.0 %      Neutrophils Absolute 2.10 10*3/mm3      Lymphocytes Absolute 1.69 10*3/mm3      Monocytes Absolute 0.68 10*3/mm3      Eosinophils Absolute 0.09 10*3/mm3      Anisocytosis Slight/1+     Macrocytes Slight/1+     WBC Morphology Normal     Platelet Estimate Decreased    CBC Auto Differential [[id number]]  (Abnormal) Collected: 08/14/24 1925    Specimen: Blood from Arm, Left Updated: 08/14/24 2017     WBC 4.56 10*3/mm3      RBC 2.90 10*6/mm3      Hemoglobin 9.9 g/dL      Hematocrit 31.2 %      .6 fL      MCH 34.1 pg      MCHC 31.7 g/dL      RDW 13.8 %      RDW-SD 54.8 fl      MPV 10.8 fL      Platelets 86 10*3/mm3     Narrative:      The previously reported component NRBC is no longer being reported. Previous result was 0.0 /100 WBC (Reference Range: 0.0-0.2 /100 WBC) on 8/14/2024 at 1948 EDT.    Ammonia [[id number]]  (Abnormal) Collected: 08/14/24 1936    Specimen: Blood Updated: 08/14/24 2004     Ammonia 82 umol/L     Comprehensive Metabolic Panel [[id number]]  (Abnormal) Collected: 08/14/24 1925    Specimen: Blood from Arm, Left Updated: 08/14/24 2004     Glucose 126 mg/dL      BUN 11 mg/dL      Creatinine 0.69 mg/dL      Sodium 136 mmol/L      Potassium 4.0 mmol/L      Chloride 102 mmol/L      CO2 25.4 mmol/L      Calcium 9.1 mg/dL      Total Protein 6.9 g/dL      Albumin 2.6 g/dL      ALT (SGPT) 51 U/L      AST (SGOT) 89 U/L      Alkaline Phosphatase 146 U/L      Total Bilirubin 3.7 mg/dL      Globulin 4.3 gm/dL      A/G Ratio 0.6 g/dL      BUN/Creatinine Ratio 15.9     Anion Gap 8.6 mmol/L      eGFR 109.2 mL/min/1.73     Narrative:      GFR Normal >60  Chronic Kidney Disease <60  Kidney Failure <15      BNP [[id number]]  (Normal) Collected: 08/14/24 1925    Specimen: Blood from Arm, Left Updated:  08/14/24 2004     proBNP 122.0 pg/mL     Narrative:      This assay is used as an aid in the diagnosis of individuals suspected of having heart failure. It can be used as an aid in the diagnosis of acute decompensated heart failure (ADHF) in patients presenting with signs and symptoms of ADHF to the emergency department (ED). In addition, NT-proBNP of <300 pg/mL indicates ADHF is not likely.    Age Range Result Interpretation  NT-proBNP Concentration (pg/mL:      <50             Positive            >450                   Gray                 300-450                    Negative             <300    50-75           Positive            >900                  Gray                300-900                  Negative            <300      >75             Positive            >1800                  Gray                300-1800                  Negative            <300    High Sensitivity Troponin T [[id number]]  (Normal) Collected: 08/14/24 1925    Specimen: Blood from Arm, Left Updated: 08/14/24 2004     HS Troponin T <6 ng/L     Narrative:      High Sensitive Troponin T Reference Range:  <14.0 ng/L- Negative Female for AMI  <22.0 ng/L- Negative Male for AMI  >=14 - Abnormal Female indicating possible myocardial injury.  >=22 - Abnormal Male indicating possible myocardial injury.   Clinicians would have to utilize clinical acumen, EKG, Troponin, and serial changes to determine if it is an Acute Myocardial Infarction or myocardial injury due to an underlying chronic condition.         D-dimer, Quantitative [[id number]]  (Abnormal) Collected: 08/14/24 1925    Specimen: Blood from Arm, Left Updated: 08/14/24 1953     D-Dimer, Quantitative 1.42 mg/L (FEU)     Narrative:      According to the assay 's published package insert, a normal (<0.50 mg/L (FEU)) D-dimer result in conjunction with a non-high clinical probability assessment, excludes deep vein thrombosis (DVT) and pulmonary embolism (PE) with high  "sensitivity.    D-dimer values increase with age and this can make VTE exclusion of an older population difficult. To address this, the [employer] College [employer], based on best available evidence and recent guidelines, recommends that clinicians use age-adjusted D-dimer thresholds in patients greater than 50 years of age with: a) a low probability of PE who do not meet all Pulmonary Embolism Rule Out Criteria, or b) in those with intermediate probability of PE.   The formula for an age-adjusted D-dimer cut-off is \"age/100\".  For example, a 60 year old patient would have an age-adjusted cut-off of 0.60 mg/L (FEU) and an 80 year old 0.80 mg/L (FEU).    Extra Tubes [[id number]] Collected: 08/14/24 1925    Specimen: Blood from Arm, Left Updated: 08/14/24 1946    Narrative:      The following orders were created for panel order Extra Tubes.  Procedure                               Abnormality         Status                     ---------                               -----------         ------                     Gold Top - SST[[id number]]                                   Final result                 Please view results for these tests on the individual orders.    Gold Top - SST [[id number]] Collected: 08/14/24 1925    Specimen: Blood from Arm, Left Updated: 08/14/24 1946     Extra Tube Hold for add-ons.     Comment: Auto resulted.                Imaging Results (Last 24 Hours)       Procedure Component Value Units Date/Time    CT Angiogram Chest Pulmonary Embolism [[id number]] Collected: 08/14/24 2148     Updated: 08/14/24 2159    Narrative:      CT ANGIOGRAM CHEST PULMONARY EMBOLISM    Date of Exam: 8/14/2024 9:08 PM EDT    Indication: sob. Elevated D-dimer    Comparison: CT abdomen and pelvis with contrast 8/9/2024    Technique: Axial CT images were obtained of the chest after the uneventful intravenous administration of iodinated contrast utilizing pulmonary embolism protocol.  Sagittal and coronal reconstructions " were performed.  Automated exposure control and   iterative reconstruction methods were used.    Findings:  Visualized soft tissues of the lower neck are without acute abnormality. The heart is mildly enlarged. Aortic valve calcifications. Negative for aortic aneurysm. Coronary artery calcifications are present. No pericardial effusion. No pleural effusion.   Pulmonary arteries are well-opacified with contrast. Negative for pulmonary embolus. No mediastinal or hilar adenopathy. No axillary adenopathy.    Trachea and mainstem bronchi are patent. Emphysema. No pneumothorax. Minimal dependent basilar atelectasis. No findings of pneumonia. No suspicious pulmonary nodule. Calcified left lower lobe granuloma.    Upper abdomen demonstrates small perihepatic ascites. Micronodular cirrhotic liver morphology. Mild splenomegaly unchanged. Normal adrenal glands. Large amount of stool in the colon at the hepatic flexure partially imaged. Upper abdominal aortic branch   vasculature patent. No aggressive osseous lesion.      Impression:      Impression:  1. Negative for pulmonary embolus.  2. No acute intrathoracic process.  3. Suspected cirrhotic liver with small upper abdominal ascites and splenomegaly.  4. Emphysema, coronary artery calcifications, and additional chronic findings above.        Electronically Signed: Sage Sharma MD    8/14/2024 9:57 PM EDT    Workstation ID: [id number]    XR Chest 1 View [[id number]] Collected: 08/14/24 2008     Updated: 08/14/24 2011    Narrative:      XR CHEST 1 VW    Date of Exam: 8/14/2024 7:45 PM EDT    Indication: sob    Comparison: CT abdomen pelvis 8/9/2024    Findings:  Elevated right diaphragm. Cardiomegaly. Left lower lobe airspace opacity atelectasis versus pneumonia similar to previous study. No definite pleural effusion. Pulmonary vessels are distinct.      Impression:      Impression:  No change from CT abdomen pelvis appearance of chest. Left lower lobe airspace opacity atelectasis  versus pneumonia.      Electronically Signed: Karen Salgado MD    8/14/2024 8:09 PM EDT    Workstation ID: [id number]              Assessment & Plan        This is a 43 y.o. female with:    Active and Resolved Problems  Active Hospital Problems    Diagnosis  POA    **Bradycardia [R00.1]  Yes     Priority: High    Hyperammonemia [E72.20]  Yes     Priority: Medium    Alcoholic cirrhosis of liver with ascites [K70.31]  Yes     Priority: Medium    Thrombocytopenia [D69.6]  Yes    Macrocytic anemia [D53.9]  Yes    Chronic GERD [K21.9]  Yes    Major depressive disorder, recurrent episode, moderate degree [F33.1]  Yes    Alcohol abuse [F10.10]  Yes    Tobacco abuse [Z72.0]  Yes      Resolved Hospital Problems   No resolved problems to display.       Bradycardia, possible atrial fibs with slow ventricular rate, required atropine 0.5 mg x 1 in ED continuous cardiac monitoring cardiology consulted    Hyperammonemia, ammonia 82, lactulose given in ED on home lactulose    Alcoholic cirrhosis of liver with ascites chronic recently seen by GI yesterday, on home lactulose, rifaximin spironolactone Actigall, currently in rehab    Thrombocytopenia, stable likely secondary to alcoholic cirrhosis monitor CBC    Macrocytic anemia, stable monitor CBC likely secondary to liver disease    Chronic GERD history of esophageal varices denies hematemesis, 40 mg IV Protonix daily    Major depressive disorder, no home meds per current MAR    Alcohol abuse, currently in rehab    Tobacco abuse, emphysema per CT scan, starting with giving inhalers given bradycardia  VTE Prophylaxis:  Mechanical VTE prophylaxis orders are present.        The patient desires to be as follows:    CODE STATUS:    Code Status (Patient has no pulse and is not breathing): CPR (Attempt to Resuscitate)  Medical Interventions (Patient has pulse or is breathing): Full Support          Admission Status:  I believe this patient meets inpatient  status.    Expected Length of Stay:  pending further evaluation and clinical course     PDMP and Medication Dispenses via Sidebar reviewed and consistent with patient reported medications.    I discussed the patient's findings and my recommendations with patient.      Signature:     This document has been electronically signed by MARC Arnold on August 14, 2024 23:19 EDT   [facility]

## 2024-08-15 NOTE — PLAN OF CARE
Goal Outcome Evaluation:      Pt arrived from ER, heart rate has been in the high 50s-60s. HR increases to the 70s with activity.

## 2024-08-15 NOTE — ED PROVIDER NOTES
Subjective   History of Present Illness  43-year-old female with history of alcohol abuse just discharged from this facility yesterday presents for shortness of breath.  Began worse over several days.  Found to be bradycardic to the 40s in triage.  Has history of alcoholic cirrhosis.  Patient here from rehab facility.    Patient is very sleepy on exam.  Review of Systems  See HPI.  Past Medical History:   Diagnosis Date    Alcohol abuse 11/08/2022    Anemia of chronic disease 07/29/2024    Chronic GERD 08/09/2024    Chronic hepatitis C virus infection 11/08/2022    HAVP Negative 11/03/2022     HEPBSAG Negative 11/03/2022     HEPBCAB Negative 11/03/2022     HEPC10 Positive (A) 11/03/2022             Cirrhosis     Colitis 07/17/2024    Elevated LFTs 11/06/2019    Esophageal varices     Lesion of vocal fold 07/29/2024    Major depressive disorder, recurrent episode, moderate degree 02/17/2024    Neuropathy 08/09/2024    Pelvic congestion syndrome 07/28/2023    Portal vein thrombosis 07/11/2024    Sepsis without acute organ dysfunction 11/03/2022       No Known Allergies    History reviewed. No pertinent surgical history.    Family History   Family history unknown: Yes       Social History     Socioeconomic History    Marital status: Significant Other   Tobacco Use    Smoking status: Every Day     Types: Cigarettes    Smokeless tobacco: Current   Vaping Use    Vaping status: Every Day    Substances: Nicotine, THC, CBD, Flavoring    Devices: Disposable   Substance and Sexual Activity    Alcohol use: Yes     Comment: Daily - 1-2 pints a day currently    Drug use: Yes     Types: Marijuana    Sexual activity: Defer           Objective   Physical Exam  No acute distress, bradycardic rate and regular rhythm, no tachypnea or increased work of breathing, abdomen soft nontender without rebound or guarding, chronically ill-appearing, alert and oriented x 2.  Procedures           ED Course      /77   Pulse 55   Temp 98.1  "°F (36.7 °C) (Oral)   Resp 12   Ht 170.2 cm (67\")   Wt 83.3 kg (183 lb 10.3 oz)   LMP  (LMP Unknown)   SpO2 97%   BMI 28.76 kg/m²   Labs Reviewed   COMPREHENSIVE METABOLIC PANEL - Abnormal; Notable for the following components:       Result Value    Glucose 126 (*)     Albumin 2.6 (*)     ALT (SGPT) 51 (*)     AST (SGOT) 89 (*)     Alkaline Phosphatase 146 (*)     Total Bilirubin 3.7 (*)     All other components within normal limits    Narrative:     GFR Normal >60  Chronic Kidney Disease <60  Kidney Failure <15     D-DIMER, QUANTITATIVE - Abnormal; Notable for the following components:    D-Dimer, Quantitative 1.42 (*)     All other components within normal limits    Narrative:     According to the assay 's published package insert, a normal (<0.50 mg/L (FEU)) D-dimer result in conjunction with a non-high clinical probability assessment, excludes deep vein thrombosis (DVT) and pulmonary embolism (PE) with high sensitivity.    D-dimer values increase with age and this can make VTE exclusion of an older population difficult. To address this, the [employer], based on best available evidence and recent guidelines, recommends that clinicians use age-adjusted D-dimer thresholds in patients greater than 50 years of age with: a) a low probability of PE who do not meet all Pulmonary Embolism Rule Out Criteria, or b) in those with intermediate probability of PE.   The formula for an age-adjusted D-dimer cut-off is \"age/100\".  For example, a 60 year old patient would have an age-adjusted cut-off of 0.60 mg/L (FEU) and an 80 year old 0.80 mg/L (FEU).   CBC WITH AUTO DIFFERENTIAL - Abnormal; Notable for the following components:    RBC 2.90 (*)     Hemoglobin 9.9 (*)     Hematocrit 31.2 (*)     .6 (*)     MCH 34.1 (*)     RDW-SD 54.8 (*)     Platelets 86 (*)     All other components within normal limits    Narrative:     The previously reported component NRBC is no longer being " reported. Previous result was 0.0 /100 WBC (Reference Range: 0.0-0.2 /100 WBC) on 8/14/2024 at 1948 EDT.   AMMONIA - Abnormal; Notable for the following components:    Ammonia 82 (*)     All other components within normal limits   MANUAL DIFFERENTIAL - Abnormal; Notable for the following components:    Monocyte % 15.0 (*)     All other components within normal limits   BNP (IN-HOUSE) - Normal    Narrative:     This assay is used as an aid in the diagnosis of individuals suspected of having heart failure. It can be used as an aid in the diagnosis of acute decompensated heart failure (ADHF) in patients presenting with signs and symptoms of ADHF to the emergency department (ED). In addition, NT-proBNP of <300 pg/mL indicates ADHF is not likely.    Age Range Result Interpretation  NT-proBNP Concentration (pg/mL:      <50             Positive            >450                   Gray                 300-450                    Negative             <300    50-75           Positive            >900                  Gray                300-900                  Negative            <300      >75             Positive            >1800                  Gray                300-1800                  Negative            <300   TROPONIN - Normal    Narrative:     High Sensitive Troponin T Reference Range:  <14.0 ng/L- Negative Female for AMI  <22.0 ng/L- Negative Male for AMI  >=14 - Abnormal Female indicating possible myocardial injury.  >=22 - Abnormal Male indicating possible myocardial injury.   Clinicians would have to utilize clinical acumen, EKG, Troponin, and serial changes to determine if it is an Acute Myocardial Infarction or myocardial injury due to an underlying chronic condition.        COVID-19/FLUA&B/RSV, NP SWAB IN TRANSPORT MEDIA 1 HR TAT   SCAN SLIDE   HIGH SENSITIVITIY TROPONIN T 2HR    Narrative:     High Sensitive Troponin T Reference Range:  <14.0 ng/L- Negative Female for AMI  <22.0 ng/L- Negative Male for  AMI  >=14 - Abnormal Female indicating possible myocardial injury.  >=22 - Abnormal Male indicating possible myocardial injury.   Clinicians would have to utilize clinical acumen, EKG, Troponin, and serial changes to determine if it is an Acute Myocardial Infarction or myocardial injury due to an underlying chronic condition.        COMPREHENSIVE METABOLIC PANEL   COMPREHENSIVE METABOLIC PANEL   CBC WITH AUTO DIFFERENTIAL   CBC AND DIFFERENTIAL    Narrative:     The following orders were created for panel order CBC & Differential.  Procedure                               Abnormality         Status                     ---------                               -----------         ------                     CBC Auto Differential[[id number]]        Abnormal            Final result               Scan Slide[[id number]]                                       Final result                 Please view results for these tests on the individual orders.   EXTRA TUBES    Narrative:     The following orders were created for panel order Extra Tubes.  Procedure                               Abnormality         Status                     ---------                               -----------         ------                     Gold Top - SST[[id number]]                                   Final result                 Please view results for these tests on the individual orders.   GOLD TOP - SST   CBC AND DIFFERENTIAL    Narrative:     The following orders were created for panel order CBC & Differential.  Procedure                               Abnormality         Status                     ---------                               -----------         ------                     CBC Auto Differential[[id number]]                                                         Please view results for these tests on the individual orders.     Medications   sodium chloride 0.9 % flush 10 mL (10 mL Intravenous Given 8/14/24 2050)   pantoprazole (PROTONIX) EC  tablet 40 mg (has no administration in time range)   lactulose (CHRONULAC) 10 GM/15ML solution 20 g (20 g Oral Given 8/14/24 2032)   atropine sulfate injection 0.5 mg (0.5 mg Intravenous Given 8/14/24 2049)   iopamidol (ISOVUE-370) 76 % injection 100 mL (100 mL Intravenous Given 8/14/24 2130)     CT Angiogram Chest Pulmonary Embolism    Result Date: 8/14/2024  Impression: 1. Negative for pulmonary embolus. 2. No acute intrathoracic process. 3. Suspected cirrhotic liver with small upper abdominal ascites and splenomegaly. 4. Emphysema, coronary artery calcifications, and additional chronic findings above. Electronically Signed: Sage Sharma MD  8/14/2024 9:57 PM EDT  Workstation ID: [id number]    XR Chest 1 View    Result Date: 8/14/2024  Impression: No change from CT abdomen pelvis appearance of chest. Left lower lobe airspace opacity atelectasis versus pneumonia. Electronically Signed: Karen Salgado MD  8/14/2024 8:09 PM EDT  Workstation ID: [id number]                                          Medical Decision Making  Problems Addressed:  Chest pain, unspecified type: complicated acute illness or injury  Shortness of breath: complicated acute illness or injury  Symptomatic bradycardia: complicated acute illness or injury    Amount and/or Complexity of Data Reviewed  Labs: ordered.  Radiology: ordered.  ECG/medicine tests: ordered.    Risk  Prescription drug management.  Decision regarding hospitalization.      Critical Care Time     The high probability of sudden, clinically significant deterioration in the patient's condition required the highest level of my preparedness to intervene urgently.  The services I provided to this patient were to treat and/or prevent clinically significant deterioration that could result in: Cardiovascular collapse and death. Services included the following: chart data review, reviewing nurses notes and/or old charts, documentation time, consultant collaboration regarding findings and  treatment options, vital sign assessments and ordering, interpreting and reviewing diagnostic studies/lab tests.  Aggregate critical care time was 34 minutes, which includes only time during which I was engaged in work directly related to the patient's care, as described above, whether at the bedside or elsewhere in the Emergency Department. It did not include time spent performing other reported procedures or the services of residents, students, nurses or physician assistants.      EKG interpretation: Rate 44, sinus bradycardia, normal axis, no acute ischemic changes.    My interpretation of CTA chest is negative for central pulmonary embolus.  See system for radiology interpretation.    Patient bradycardia down to 40s and sometimes upper 30s.  Borderline blood pressure.  Sinus on monitor.  Given dose of atropine and heart rate improved.    Final diagnoses:   Symptomatic bradycardia   Shortness of breath   Chest pain, unspecified type       ED Disposition  ED Disposition       ED Disposition   Decision to Admit    Condition   --    Comment   Level of Care: Telemetry [5]   Diagnosis: Bradycardia [[id number]]   Admitting Physician: EVARISTO LOPEZ [[id number]]   Attending Physician: EVARISTO LOPEZ [[id number]]   Certification: I Certify That Inpatient Hospital Services Are Medically Necessary For Greater Than 2 Midnights                 No follow-up provider specified.       Medication List      No changes were made to your prescriptions during this visit.            Checo Avila MD  08/14/24 [id number]

## 2024-08-15 NOTE — PLAN OF CARE
Problem: Adult Inpatient Plan of Care  Goal: Absence of Hospital-Acquired Illness or Injury  Intervention: Identify and Manage Fall Risk  Recent Flowsheet Documentation  Taken 8/15/2024 1800 by Ploly Thomson RN  Safety Promotion/Fall Prevention:   activity supervised   room organization consistent   safety round/check completed  Taken 8/15/2024 1400 by Polly Thomson RN  Safety Promotion/Fall Prevention:   safety round/check completed   room organization consistent   clutter free environment maintained  Taken 8/15/2024 1200 by Polly Thomson RN  Safety Promotion/Fall Prevention:   safety round/check completed   room organization consistent   clutter free environment maintained  Taken 8/15/2024 1000 by Polly Thomson RN  Safety Promotion/Fall Prevention:   safety round/check completed   room organization consistent   clutter free environment maintained  Taken 8/15/2024 0800 by Polly Thomson RN  Safety Promotion/Fall Prevention:   safety round/check completed   room organization consistent   clutter free environment maintained     Problem: Adult Inpatient Plan of Care  Goal: Absence of Hospital-Acquired Illness or Injury  Intervention: Prevent and Manage VTE (Venous Thromboembolism) Risk  Recent Flowsheet Documentation  Taken 8/15/2024 1800 by Polly Thomson RN  Activity Management: up to bedside commode  Taken 8/15/2024 1400 by Polly Thomson RN  Activity Management: up to bedside commode  Taken 8/15/2024 1200 by Polly Thomson RN  Activity Management: up to bedside commode   Goal Outcome Evaluation: Patient alert and oriented, patient turned off bed alarm, up to BSC, pt b/p dropped to 83/47, doctor notified, meds restarted, pt wanting pain medication however due to low b/p unable to give her any, patient was explained this reason, call light within reach

## 2024-08-15 NOTE — ED NOTES
Nursing report ED to floor  Maria Elena Byrnes  43 y.o.  female    HPI:   Chief Complaint   Patient presents with    Shortness of Breath     Pt c/o SOA and weakness for past few months with progressing sx, pt reports CP past few days.       Admitting doctor:   Red Pittman DO    Admitting diagnosis:   The primary encounter diagnosis was Symptomatic bradycardia. Diagnoses of Shortness of breath and Chest pain, unspecified type were also pertinent to this visit.    Code status:   Current Code Status       Date Active Code Status Order ID Comments User Context       8/14/2024 2236 CPR (Attempt to Resuscitate) [id number]  Meryl Patel APRN ED        Question Answer    Code Status (Patient has no pulse and is not breathing) CPR (Attempt to Resuscitate)    Medical Interventions (Patient has pulse or is breathing) Full Support                    Allergies:   Patient has no known allergies.    Isolation:  No active isolations     Fall Risk:  Fall Risk Assessment was completed, and patient is at high risk for falls.   Predictive Model Details         5 (Low) Factor Value    Calculated 8/15/2024 00:06 Age 43    Risk of Fall Model Active Peripheral IV Present     Imaging order in this encounter Present     Magnesium not on file     Albumin 2.6 g/dL     Number of Distinct Medication Classes administered 4     Total Bilirubin 3.7 mg/dL     Drug Use Current     Tobacco Use Current     Ángel Scale not on file     Diastolic BP 72     Respiratory Rate 12     ALT 51 U/L     Number of administrations of Ulcer Drugs 1     Calcium 9.1 mg/dL     Cardiac Assessment X     Chloride 102 mmol/L     Days after Admission 0.21     Creatinine 0.69 mg/dL     Potassium 4 mmol/L         Weight:       08/14/24  1901   Weight: 83.3 kg (183 lb 10.3 oz)       Intake and Output  No intake or output data in the 24 hours ending 08/15/24 0006    Diet:   Dietary Orders (From admission, onward)       Start     Ordered    08/14/24 2241  Diet:  Regular/House; Fluid Consistency: Thin (IDDSI 0)  Diet Effective Now        References:    Diet Order Crosswalk   Question Answer Comment   Diets: Regular/House    Fluid Consistency: Thin (IDDSI 0)        08/14/24 2240                     Most recent vitals:   Vitals:    08/14/24 2104 08/14/24 2232 08/14/24 2246 08/15/24 0001   BP: 121/76 116/69 120/77 113/72   BP Location: Right arm      Patient Position: Lying      Pulse: 59 57 55 57   Resp: 12      Temp:       TempSrc:       SpO2: 97% 98% 97% 98%   Weight:       Height:           Active LDAs/IV Access:   Lines, Drains & Airways       Active LDAs       Name Placement date Placement time Site Days    Peripheral IV 08/14/24 1926 Left Antecubital 08/14/24 1926  Antecubital  less than 1                    Skin Condition:   Skin Assessments (last day)       Date/Time Skin WDL Skin Color/Characteristics Skin Moisture Skin Integrity    08/14/24 19:11:59 X;color pale   -- --    Skin Color/Characteristics: purple bruises to abd, states she was recently hospitalized and they gave her lovenox at 08/14/24 1911 08/14/24 19:15:17 characteristics -- dry bruised (ecchymotic)     Skin Integrity: purple bruise to duncan abd from lovenox injections., at 08/14/24 1915             Labs (abnormal labs have a star):   Labs Reviewed   COMPREHENSIVE METABOLIC PANEL - Abnormal; Notable for the following components:       Result Value    Glucose 126 (*)     Albumin 2.6 (*)     ALT (SGPT) 51 (*)     AST (SGOT) 89 (*)     Alkaline Phosphatase 146 (*)     Total Bilirubin 3.7 (*)     All other components within normal limits    Narrative:     GFR Normal >60  Chronic Kidney Disease <60  Kidney Failure <15     D-DIMER, QUANTITATIVE - Abnormal; Notable for the following components:    D-Dimer, Quantitative 1.42 (*)     All other components within normal limits    Narrative:     According to the assay 's published package insert, a normal (<0.50 mg/L (FEU)) D-dimer result in  "conjunction with a non-high clinical probability assessment, excludes deep vein thrombosis (DVT) and pulmonary embolism (PE) with high sensitivity.    D-dimer values increase with age and this can make VTE exclusion of an older population difficult. To address this, the American College of [employer], based on best available evidence and recent guidelines, recommends that clinicians use age-adjusted D-dimer thresholds in patients greater than 50 years of age with: a) a low probability of PE who do not meet all Pulmonary Embolism Rule Out Criteria, or b) in those with intermediate probability of PE.   The formula for an age-adjusted D-dimer cut-off is \"age/100\".  For example, a 60 year old patient would have an age-adjusted cut-off of 0.60 mg/L (FEU) and an 80 year old 0.80 mg/L (FEU).   CBC WITH AUTO DIFFERENTIAL - Abnormal; Notable for the following components:    RBC 2.90 (*)     Hemoglobin 9.9 (*)     Hematocrit 31.2 (*)     .6 (*)     MCH 34.1 (*)     RDW-SD 54.8 (*)     Platelets 86 (*)     All other components within normal limits    Narrative:     The previously reported component NRBC is no longer being reported. Previous result was 0.0 /100 WBC (Reference Range: 0.0-0.2 /100 WBC) on 8/14/2024 at 1948 EDT.   AMMONIA - Abnormal; Notable for the following components:    Ammonia 82 (*)     All other components within normal limits   MANUAL DIFFERENTIAL - Abnormal; Notable for the following components:    Monocyte % 15.0 (*)     All other components within normal limits   COVID-19/FLUA&B/RSV, NP SWAB IN TRANSPORT MEDIA 1 HR TAT - Normal    Narrative:     Fact sheet for providers: https://www.fda.gov/media/444228/download    Fact sheet for patients: https://www.fda.gov/media/360586/download    Test performed by PCR.   BNP (IN-HOUSE) - Normal    Narrative:     This assay is used as an aid in the diagnosis of individuals suspected of having heart failure. It can be used as an aid in the diagnosis of acute " decompensated heart failure (ADHF) in patients presenting with signs and symptoms of ADHF to the emergency department (ED). In addition, NT-proBNP of <300 pg/mL indicates ADHF is not likely.    Age Range Result Interpretation  NT-proBNP Concentration (pg/mL:      <50             Positive            >450                   Gray                 300-450                    Negative             <300    50-75           Positive            >900                  Gray                300-900                  Negative            <300      >75             Positive            >1800                  Gray                300-1800                  Negative            <300   TROPONIN - Normal    Narrative:     High Sensitive Troponin T Reference Range:  <14.0 ng/L- Negative Female for AMI  <22.0 ng/L- Negative Male for AMI  >=14 - Abnormal Female indicating possible myocardial injury.  >=22 - Abnormal Male indicating possible myocardial injury.   Clinicians would have to utilize clinical acumen, EKG, Troponin, and serial changes to determine if it is an Acute Myocardial Infarction or myocardial injury due to an underlying chronic condition.        SCAN SLIDE   HIGH SENSITIVITIY TROPONIN T 2HR    Narrative:     High Sensitive Troponin T Reference Range:  <14.0 ng/L- Negative Female for AMI  <22.0 ng/L- Negative Male for AMI  >=14 - Abnormal Female indicating possible myocardial injury.  >=22 - Abnormal Male indicating possible myocardial injury.   Clinicians would have to utilize clinical acumen, EKG, Troponin, and serial changes to determine if it is an Acute Myocardial Infarction or myocardial injury due to an underlying chronic condition.        COMPREHENSIVE METABOLIC PANEL   COMPREHENSIVE METABOLIC PANEL   CBC WITH AUTO DIFFERENTIAL   CBC AND DIFFERENTIAL    Narrative:     The following orders were created for panel order CBC & Differential.  Procedure                               Abnormality         Status                      ---------                               -----------         ------                     CBC Auto Differential[[id number]]        Abnormal            Final result               Scan Slide[[id number]]                                       Final result                 Please view results for these tests on the individual orders.   EXTRA TUBES    Narrative:     The following orders were created for panel order Extra Tubes.  Procedure                               Abnormality         Status                     ---------                               -----------         ------                     Gold Top - SST[[id number]]                                   Final result                 Please view results for these tests on the individual orders.   GOLD TOP - SST   CBC AND DIFFERENTIAL    Narrative:     The following orders were created for panel order CBC & Differential.  Procedure                               Abnormality         Status                     ---------                               -----------         ------                     CBC Auto Differential[[id number]]                                                         Please view results for these tests on the individual orders.       LOC: Person, Place, Time, and Situation    Telemetry:  Telemetry    Cardiac Monitoring Ordered: yes    EKG:   ECG 12 Lead Chest Pain   Preliminary Result   HEART RATE=44  bpm   RR Veghnqqa=1748  ms   VT Viupcjkr=182  ms   P Horizontal Axis=29  deg   P Front Axis=14  deg   QRSD Lxvyrfkt=887  ms   QT Ljpofgij=214  ms   NXgJ=318  ms   QRS Axis=38  deg   T Wave Axis=37  deg   - BORDERLINE ECG -   Bradycardia with irregular rate   Date and Time of Study:2024-08-14 19:12:50          Medications Given in the ED:   Medications   sodium chloride 0.9 % flush 10 mL (10 mL Intravenous Given 8/14/24 2050)   pantoprazole (PROTONIX) EC tablet 40 mg (has no administration in time range)   lactulose (CHRONULAC) 10 GM/15ML solution 20 g (20 g Oral  Given 8/14/24 2032)   atropine sulfate injection 0.5 mg (0.5 mg Intravenous Given 8/14/24 2049)   iopamidol (ISOVUE-370) 76 % injection 100 mL (100 mL Intravenous Given 8/14/24 2130)       Imaging results:  CT Angiogram Chest Pulmonary Embolism    Result Date: 8/14/2024  Impression: 1. Negative for pulmonary embolus. 2. No acute intrathoracic process. 3. Suspected cirrhotic liver with small upper abdominal ascites and splenomegaly. 4. Emphysema, coronary artery calcifications, and additional chronic findings above. Electronically Signed: Sage Sharma MD  8/14/2024 9:57 PM EDT  Workstation ID: [id number]    XR Chest 1 View    Result Date: 8/14/2024  Impression: No change from CT abdomen pelvis appearance of chest. Left lower lobe airspace opacity atelectasis versus pneumonia. Electronically Signed: Karen Salgado MD  8/14/2024 8:09 PM EDT  Workstation ID: [id number]     Social issues:   Social History     Socioeconomic History    Marital status: Significant Other   Tobacco Use    Smoking status: Every Day     Types: Cigarettes    Smokeless tobacco: Current   Vaping Use    Vaping status: Every Day    Substances: Nicotine, THC, CBD, Flavoring    Devices: Disposable   Substance and Sexual Activity    Alcohol use: Yes     Comment: Daily - 1-2 pints a day currently    Drug use: Yes     Types: Marijuana    Sexual activity: Defer       NIH Stroke Scale:  Interval: (not recorded)  1a. Level of Consciousness: (not recorded)  1b. LOC Questions: (not recorded)  1c. LOC Commands: (not recorded)  2. Best Gaze: (not recorded)  3. Visual: (not recorded)  4. Facial Palsy: (not recorded)  5a. Motor Arm, Left: (not recorded)  5b. Motor Arm, Right: (not recorded)  6a. Motor Leg, Left: (not recorded)  6b. Motor Leg, Right: (not recorded)  7. Limb Ataxia: (not recorded)  8. Sensory: (not recorded)  9. Best Language: (not recorded)  10. Dysarthria: (not recorded)  11. Extinction and Inattention (formerly Neglect): (not recorded)    Total (NIH  Stroke Scale): (not recorded)     Additional notable assessment information:     Nursing report ED to floor:  [id number]    Kalyani Pinon RN   08/15/24 00:06 EDT

## 2024-08-15 NOTE — CASE MANAGEMENT/SOCIAL WORK
LACE Assessment      Patient Name: Maria Elena Byrnes  : 1980  MRN: [id number]  Address: [address] [place] [address] [place] [facility]    Risk for Readmission (LACE) Score: 13 (8/15/2024  6:06 AM)      Personal History     Past Medical History:   Diagnosis Date    Alcohol abuse 2022    Anemia of chronic disease 2024    Chronic GERD 2024    Chronic hepatitis C virus infection 2022    HAVP Negative 2022     HEPBSAG Negative 2022     HEPBCAB Negative 2022     HEPC10 Positive (A) 2022             Cirrhosis     Colitis 2024    Elevated LFTs 2019    Esophageal varices     Lesion of vocal fold 2024    Major depressive disorder, recurrent episode, moderate degree 2024    Neuropathy 2024    Pelvic congestion syndrome 2023    Portal vein thrombosis 2024    Sepsis without acute organ dysfunction 2022       History reviewed. No pertinent surgical history.    Family History: Family history is unknown by patient. Otherwise pertinent FHx was reviewed and not pertinent to current issue.    Social History:  reports that she has been smoking cigarettes. She uses smokeless tobacco. She reports current alcohol use. She reports current drug use. Drug: Marijuana.      Social Determinants of Health     Social Determinants of Health     Tobacco Use: High Risk (8/15/2024)    Patient History     Smoking Tobacco Use: Every Day     Smokeless Tobacco Use: Current     Passive Exposure: Not on file   Alcohol Use: Alcohol Misuse (8/15/2024)    AUDIT-C     Frequency of Alcohol Consumption: 4 or more times a week     Average Number of Drinks: 10 or more     Frequency of Binge Drinking: Daily or almost daily   Financial Resource Strain: High Risk (8/15/2024)    Overall Financial Resource Strain (CARDIA)     Difficulty of Paying Living Expenses: Hard   Food Insecurity: Food Insecurity Present (8/15/2024)    Hunger Vital Sign     Worried About  Running Out of Food in the Last Year: Often true     Ran Out of Food in the Last Year: Often true   Transportation Needs: Unmet Transportation Needs (8/15/2024)    PRAPARE - Transportation     Lack of Transportation (Medical): Yes     Lack of Transportation (Non-Medical): Yes   Physical Activity: Inactive (8/15/2024)    Exercise Vital Sign     Days of Exercise per Week: 0 days     Minutes of Exercise per Session: 0 min   Stress: Stress Concern Present (8/15/2024)    [employer] - Occupational Stress Questionnaire     Feeling of Stress : To some extent   Social Connections: At Risk (8/9/2024)    Family and Community Support     Help with Day-to-Day Activities: I could use a little more help     Lonely or Isolated: Sometimes   Interpersonal Safety: Not At Risk (8/15/2024)    Abuse Screen     Unsafe at Home or Work/School: no     Feels Threatened by Someone?: no     Does Anyone Keep You from Contacting Others or Doint Things Outside the Home?: no     Physical Sign of Abuse Present: no   Depression: At risk (8/15/2024)    PHQ-2     PHQ-2 Score: 4   Housing Stability: Unknown (8/15/2024)    Housing Stability     Current Living Arrangements: homeless     Potentially Unsafe Housing Conditions: unable to assess   Recent Concern: Housing Stability - High Risk (7/28/2024)    Received from [employer]    Housing Stability     What is your living situation today?: I have a place to live today, but I am worried about losing it in the future     Think about the place you live. Do you have problems with any of the following: Pests such as bugs, ants, or mice, Mold, Lead paint or pipes, Lack of heat, Oven or stove not working, Smoke detector...: None of the above     Current Housing Concerns: Not on file   Utilities: Not At Risk (8/15/2024)    [facility] Utilities     Threatened with loss of utilities: No   Recent Concern: Utilities - High Risk (7/28/2024)    Received from [employer]    Utility  Difficulties     In the past 12 months, has the electric, gas, oil, or water company threatened to shut off services in your home?: Yes   Health Literacy: Not At Risk (8/15/2024)    Education     Help with school or training?: No     Preferred Language: English   Employment: Not At Risk (8/9/2024)    Employment     Do you want help finding or keeping work or a job?: I do not need or want help   Disabilities: Not At Risk (8/15/2024)    Disabilities     Concentrating, Remembering, or Making Decisions Difficulty: no     Doing Errands Independently Difficulty: no       Impressions     SW consulted regarding LACE needs.  SW met with patient by the bedside. SW introduced self and explained role to patient. Patient said they understood.    Home Environment: Patient is from [place], [place] and is currently unhoused. Patient states prior to coming to [facility] for treatment, she had been unhoused x1 year. Patient states she would stay with friends and/or family here and there. Patient substance use has continued to impact her ability to obtain employment, have funds for housing, etc.     Income: No current income, multiple social barriers. Intent is to get sober to work towards next steps.     Employment: Unemployed at present time.     Mental Health: Patient reports having been diagnosed years ago with Anxiety and Depression. Denies current medication or OP support services such as counseling or psychiatry. Denies need at present time. Substance use is current coping skill.     [place] Plan: Intent is to return to [facility] for IP ETOH treatment once medically stable for hospital discharge.     Substance Use: Ongoing ETOH/tobacco use, see SA section for details.     Hardships: Multiple psychosocial stressors - unhoused, limited support system, substance abuse, financial means inadequate, no transportation (outside Medicaid transport), etc. Current step is to address ongoing substance abuse.     Assessment       Psychosocial       Row Name 08/15/24 0990       Values/Beliefs    Spiritual, Cultural Beliefs, Sikh Practices, Values that Affect Care no       Behavior WDL    Behavior WDL WDL       Mental Health    Little Interest or Pleasure in Doing Things 3-->nearly every day  Mulitple life stressors, not enrolled in services, declined to start.    Feeling Down, Depressed or Hopeless 1-->several days  Mulitple life stressors, not enrolled in services, declined to start.       Stress    Do you feel stress - tense, restless, nervous, or anxious, or unable to sleep at night because your mind is troubled all the time - these days? To some exte       Coping/Stress    Major Change/Loss/Stressor limited support system;housing concerns;financial    Patient Personal Strengths tolerant    Sources of Support adult child(emilia);significant other    Techniques to [place] with Loss/Stress/Change substance use    Reaction to Health Status depressed    Understanding of Condition and Treatment unable to assess       Developmental Stage (Samiksson's)    Developmental Stage Stage 7 (35-65 years/Middle Adulthood) Generativity vs. Stagnation       C-SSRS (Recent)    Q1 Wished to be Dead (Past Month) no    Q2 Suicidal Thoughts (Past Month) no    Q6 Suicide Behavior (Lifetime) no       Violence Risk    Feels Like Hurting Others no    Previous Attempt to Harm Others no                   Abuse/Neglect       Row Name 08/15/24 0945       Personal Safety    Feels Unsafe at Home or Work/School no    Feels Threatened by Someone no    Does Anyone Try to Keep You From Having Contact with Others or Doing Things Outside Your Home? no    Physical Signs of Abuse Present no                   Legal       Row Name 08/15/24 0960       Financial Resource Strain    How hard is it for you to pay for the very basics like food, housing, medical care, and heating? Hard       Financial/Legal    Source of Income none    Financial/Environmental Concerns unable to afford  rent/mortgage;unemployed    Who Manages Finances if Patient Unable No one    Application for Public Assistance not applied    Finance Comments Multiple financial barriers.       Legal    Criminal Activity/Legal Involvement other (see comments)  multiple decided cases reported                   Substance Abuse       Row Name 08/15/24 0955       Substance Use    Substance Use Status current alcohol use;current tobacco use    Last Tobacco Use Date --  unknown    Environment Typically Uses Tobacco alone    Reported Characteristics of Tobacco Use tolerance    Readiness to Change Tobacco Use precontemplation    Attempts to Quit Tobacco Use none    Last Alcohol Use --  unknown    Environment Typically Uses Alcohol alone;with another person    Reported Characteristics of Alcohol Use withdrawal symptoms;unsuccessful efforts to stop;tolerance    Readiness to Change Alcohol Use action    Attempts to Quit Alcohol inpatient substance abuse rehabilitation    Alcohol Withdrawal Pattern difficulty concentrating;irritability;tremor(s);nausea    Previous Substance Use Treatment detox unit;inpatient rehab    Substance Use Comment Patient came to [facility] from [facility]. States she came for treatment program d/t ongoing ETOH use. States she was drinking 1-2 1/5ths per day of gin. Unknown last drink. Expresses desire to return to treatment. Patient continues with tobacco use, 4-5 cigs per day. Denies desire to quit smoking at this time.                    Education     See education activity for details.     HEATH Metzger, LSW, CCM    Phone: [phone]  Cell: [phone]  Fax: [phone]  [email or web address]

## 2024-08-16 LAB
ALBUMIN SERPL-MCNC: 2.5 G/DL (ref 3.5–5.2)
ALBUMIN/GLOB SERPL: 0.6 G/DL
ALP SERPL-CCNC: 137 U/L (ref 39–117)
ALT SERPL W P-5'-P-CCNC: 48 U/L (ref 1–33)
ANION GAP SERPL CALCULATED.3IONS-SCNC: 9.1 MMOL/L (ref 5–15)
ANISOCYTOSIS BLD QL: NORMAL
AST SERPL-CCNC: 83 U/L (ref 1–32)
BILIRUB SERPL-MCNC: 2.9 MG/DL (ref 0–1.2)
BUN SERPL-MCNC: 9 MG/DL (ref 6–20)
BUN/CREAT SERPL: 12.9 (ref 7–25)
CALCIUM SPEC-SCNC: 8.4 MG/DL (ref 8.6–10.5)
CHLORIDE SERPL-SCNC: 106 MMOL/L (ref 98–107)
CO2 SERPL-SCNC: 20.9 MMOL/L (ref 22–29)
CREAT SERPL-MCNC: 0.7 MG/DL (ref 0.57–1)
DEPRECATED RDW RBC AUTO: 53.5 FL (ref 37–54)
EGFRCR SERPLBLD CKD-EPI 2021: 108.8 ML/MIN/1.73
ERYTHROCYTE [DISTWIDTH] IN BLOOD BY AUTOMATED COUNT: 13.6 % (ref 12.3–15.4)
GLOBULIN UR ELPH-MCNC: 3.9 GM/DL
GLUCOSE SERPL-MCNC: 119 MG/DL (ref 65–99)
HCT VFR BLD AUTO: 28.8 % (ref 34–46.6)
HGB BLD-MCNC: 9.3 G/DL (ref 12–15.9)
LARGE PLATELETS: NORMAL
LYMPHOCYTES # BLD MANUAL: 2.28 10*3/MM3 (ref 0.7–3.1)
LYMPHOCYTES NFR BLD MANUAL: 6 % (ref 5–12)
MACROCYTES BLD QL SMEAR: NORMAL
MCH RBC QN AUTO: 34.4 PG (ref 26.6–33)
MCHC RBC AUTO-ENTMCNC: 32.3 G/DL (ref 31.5–35.7)
MCV RBC AUTO: 106.7 FL (ref 79–97)
MONOCYTES # BLD: 0.32 10*3/MM3 (ref 0.1–0.9)
NEUTROPHILS # BLD AUTO: 2.7 10*3/MM3 (ref 1.7–7)
NEUTROPHILS NFR BLD MANUAL: 51 % (ref 42.7–76)
PLATELET # BLD AUTO: 91 10*3/MM3 (ref 140–450)
PMV BLD AUTO: 12.2 FL (ref 6–12)
POTASSIUM SERPL-SCNC: 4 MMOL/L (ref 3.5–5.2)
PROT SERPL-MCNC: 6.4 G/DL (ref 6–8.5)
RBC # BLD AUTO: 2.7 10*6/MM3 (ref 3.77–5.28)
SCAN SLIDE: NORMAL
SMALL PLATELETS BLD QL SMEAR: NORMAL
SODIUM SERPL-SCNC: 136 MMOL/L (ref 136–145)
TSH SERPL DL<=0.05 MIU/L-ACNC: 1.72 UIU/ML (ref 0.27–4.2)
VARIANT LYMPHS NFR BLD MANUAL: 43 % (ref 19.6–45.3)
WBC MORPH BLD: NORMAL
WBC NRBC COR # BLD AUTO: 5.3 10*3/MM3 (ref 3.4–10.8)

## 2024-08-16 PROCEDURE — 85025 COMPLETE CBC W/AUTO DIFF WBC: CPT | Performed by: NURSE PRACTITIONER

## 2024-08-16 PROCEDURE — 83735 ASSAY OF MAGNESIUM: CPT | Performed by: STUDENT IN AN ORGANIZED HEALTH CARE EDUCATION/TRAINING PROGRAM

## 2024-08-16 PROCEDURE — 80053 COMPREHEN METABOLIC PANEL: CPT | Performed by: INTERNAL MEDICINE

## 2024-08-16 PROCEDURE — 80050 GENERAL HEALTH PANEL: CPT | Performed by: INTERNAL MEDICINE

## 2024-08-16 PROCEDURE — 85007 BL SMEAR W/DIFF WBC COUNT: CPT | Performed by: NURSE PRACTITIONER

## 2024-08-16 PROCEDURE — G0378 HOSPITAL OBSERVATION PER HR: HCPCS

## 2024-08-16 PROCEDURE — 84100 ASSAY OF PHOSPHORUS: CPT | Performed by: STUDENT IN AN ORGANIZED HEALTH CARE EDUCATION/TRAINING PROGRAM

## 2024-08-16 PROCEDURE — 99214 OFFICE O/P EST MOD 30 MIN: CPT | Performed by: NURSE PRACTITIONER

## 2024-08-16 RX ADMIN — RIFAXIMIN 550 MG: 550 TABLET ORAL at 09:01

## 2024-08-16 RX ADMIN — LACTULOSE 30 G: 10 SOLUTION ORAL at 21:08

## 2024-08-16 RX ADMIN — HYDROXYZINE HYDROCHLORIDE 25 MG: 25 TABLET, FILM COATED ORAL at 19:39

## 2024-08-16 RX ADMIN — FOLIC ACID 1 MG: 1 TABLET ORAL at 09:01

## 2024-08-16 RX ADMIN — URSODIOL 300 MG: 300 CAPSULE ORAL at 09:01

## 2024-08-16 RX ADMIN — RIFAXIMIN 550 MG: 550 TABLET ORAL at 21:08

## 2024-08-16 RX ADMIN — MIDODRINE HYDROCHLORIDE 10 MG: 5 TABLET ORAL at 23:27

## 2024-08-16 RX ADMIN — PANTOPRAZOLE SODIUM 40 MG: 40 TABLET, DELAYED RELEASE ORAL at 05:00

## 2024-08-16 RX ADMIN — MIDODRINE HYDROCHLORIDE 10 MG: 5 TABLET ORAL at 00:06

## 2024-08-16 RX ADMIN — URSODIOL 300 MG: 300 CAPSULE ORAL at 21:09

## 2024-08-16 RX ADMIN — MIDODRINE HYDROCHLORIDE 10 MG: 5 TABLET ORAL at 07:16

## 2024-08-16 RX ADMIN — DICYCLOMINE HYDROCHLORIDE 20 MG: 10 CAPSULE ORAL at 16:24

## 2024-08-16 RX ADMIN — HYDROXYZINE HYDROCHLORIDE 25 MG: 25 TABLET, FILM COATED ORAL at 09:05

## 2024-08-16 RX ADMIN — TRAMADOL HYDROCHLORIDE 50 MG: 50 TABLET ORAL at 04:59

## 2024-08-16 RX ADMIN — MIDODRINE HYDROCHLORIDE 10 MG: 5 TABLET ORAL at 16:23

## 2024-08-16 RX ADMIN — LACTULOSE 30 G: 10 SOLUTION ORAL at 09:48

## 2024-08-16 RX ADMIN — TRAZODONE HYDROCHLORIDE 50 MG: 50 TABLET ORAL at 23:27

## 2024-08-16 RX ADMIN — DICYCLOMINE HYDROCHLORIDE 20 MG: 10 CAPSULE ORAL at 09:01

## 2024-08-16 RX ADMIN — DICYCLOMINE HYDROCHLORIDE 20 MG: 10 CAPSULE ORAL at 21:08

## 2024-08-16 RX ADMIN — TRAMADOL HYDROCHLORIDE 50 MG: 50 TABLET ORAL at 17:39

## 2024-08-16 RX ADMIN — TRAZODONE HYDROCHLORIDE 50 MG: 50 TABLET ORAL at 00:58

## 2024-08-16 RX ADMIN — TRAMADOL HYDROCHLORIDE 50 MG: 50 TABLET ORAL at 23:27

## 2024-08-16 NOTE — PROGRESS NOTES
Referring Provider: Paddy Castillo MD    Reason for follow-up: bradycardia      Patient Care Team:  Provider, No Known as PCP - General      SUBJECTIVE    The patient is asleep. No acute distress noted. No events overnight.        ROS  Review of all systems negative except as indicated.    Since I have last seen, the patient has been without any chest discomfort, shortness of breath, palpitations, dizziness or syncope.  Denies having any headache, abdominal pain, nausea, vomiting, diarrhea, constipation, loss of weight or loss of appetite.  Denies having any excessive bruising, hematuria or blood in the stool.        Personal History:    Past Medical History:   Diagnosis Date    Alcohol abuse 11/08/2022    Anemia of chronic disease 07/29/2024    Chronic GERD 08/09/2024    Chronic hepatitis C virus infection 11/08/2022    HAVP Negative 11/03/2022     HEPBSAG Negative 11/03/2022     HEPBCAB Negative 11/03/2022     HEPC10 Positive (A) 11/03/2022             Cirrhosis     Colitis 07/17/2024    Elevated LFTs 11/06/2019    Esophageal varices     Lesion of vocal fold 07/29/2024    Major depressive disorder, recurrent episode, moderate degree 02/17/2024    Neuropathy 08/09/2024    Pelvic congestion syndrome 07/28/2023    Portal vein thrombosis 07/11/2024    Sepsis without acute organ dysfunction 11/03/2022       History reviewed. No pertinent surgical history.    Family History   Family history unknown: Yes       Social History     Tobacco Use    Smoking status: Every Day     Types: Cigarettes    Smokeless tobacco: Current   Vaping Use    Vaping status: Every Day    Substances: Nicotine, THC, CBD, Flavoring    Devices: Disposable   Substance Use Topics    Alcohol use: Yes     Comment: Daily - 1-2 pints a day currently    Drug use: Yes     Types: Marijuana        Home meds:  Prior to Admission medications    Medication Sig Start Date End Date Taking? Authorizing Provider   calcium carbonate (TUMS) 500 MG chewable  tablet Chew 1 tablet Daily.    Varsha Costello MD   cloNIDine (CATAPRES) 0.1 MG tablet Take 1 tablet by mouth Every 6 (Six) Hours As Needed for High Blood Pressure.    Varsha Costello MD   dicyclomine (BENTYL) 20 MG tablet Take 1 tablet by mouth 3 (Three) Times a Day As Needed for Abdominal Cramping.    Varsha Costello MD   docusate sodium (COLACE) 100 MG capsule Take 1 capsule by mouth Daily As Needed for Constipation.    Varsha Costello MD   folic acid (FOLVITE) 1 MG tablet Take 1 tablet by mouth Daily. 8/14/24   Danilo Guerra MD   hydrOXYzine pamoate (VISTARIL) 50 MG capsule Take 1 capsule by mouth Every 6 (Six) Hours As Needed for Itching.    Varsha Costello MD   lactulose (CHRONULAC) 10 GM/15ML solution Take 45 mL by mouth 2 (Two) Times a Day. 8/13/24   Danilo Guerra MD   loperamide (IMODIUM) 2 MG capsule Take 2 capsules by mouth Every 8 (Eight) Hours As Needed for Diarrhea.    Varsha Costello MD   magnesium hydroxide (MILK OF MAGNESIA) 400 MG/5ML suspension Take 1 mL by mouth 3 (Three) Times a Day As Needed for Constipation.    Varsha Costello MD   Melatonin 3 MG capsule Take 2 capsules by mouth At Night As Needed.    Varsha Costello MD   midodrine (PROAMATINE) 10 MG tablet Take 1 tablet by mouth Every 8 (Eight) Hours. 8/13/24   Danilo Guerra MD   ondansetron (ZOFRAN) 4 MG tablet Take 1 tablet by mouth Every 12 (Twelve) Hours As Needed for Nausea or Vomiting.    Varsha Costello MD   riFAXIMin (XIFAXAN) 550 MG tablet Take 1 tablet by mouth Every 12 (Twelve) Hours for 30 days. Indications: Impaired Brain Function due to Liver Disease 8/13/24 9/12/24  Farrah Dougherty APRN   spironolactone (ALDACTONE) 100 MG tablet Take 1 tablet by mouth Daily. 8/14/24   Danilo Guerra MD   Thiamine Mononitrate 100 MG tablet Take 1 tablet by mouth Daily. 8/14/24   Danilo Guerra MD   tiZANidine (ZANAFLEX) 4 MG tablet Take 1 tablet by mouth Every 8 (Eight) Hours As  "Needed for Muscle Spasms.    Provider, MD Varsha   traZODone (DESYREL) 50 MG tablet Take 1 tablet by mouth At Night As Needed for Sleep.    Provider, MD Varsha   ursodiol (ACTIGALL) 300 MG capsule Take 1 capsule by mouth 2 (Two) Times a Day. 8/13/24   Danilo Guerra MD       Allergies:  Patient has no known allergies.    Scheduled Meds:dicyclomine, 20 mg, Oral, TID  folic acid, 1 mg, Oral, Daily  lactulose, 30 g, Oral, BID  midodrine, 10 mg, Oral, Q8H  pantoprazole, 40 mg, Oral, Q AM  riFAXIMin, 550 mg, Oral, Q12H  ursodiol, 300 mg, Oral, BID      Continuous Infusions:   PRN Meds:.  docusate sodium    hydrOXYzine    [COMPLETED] Insert Peripheral IV **AND** sodium chloride    tiZANidine    traMADol    traZODone      OBJECTIVE    Vital Signs  Vitals:    08/16/24 0006 08/16/24 0100 08/16/24 0450 08/16/24 0716   BP: 91/46 103/53 98/61 (!) 88/54   BP Location:  Right arm Left arm    Patient Position:  Lying Lying    Pulse: 83 68 75 70   Resp:  20 14    Temp:  98.3 °F (36.8 °C) 97.9 °F (36.6 °C)    TempSrc:  Oral Oral    SpO2:  96% 96%    Weight:   82.4 kg (181 lb 10.5 oz)    Height:           Flowsheet Rows      Flowsheet Row First Filed Value   Admission Height 170.2 cm (67\") Documented at 08/14/2024 1901   Admission Weight 83.3 kg (183 lb 10.3 oz) Documented at 08/14/2024 1901              Intake/Output Summary (Last 24 hours) at 8/16/2024 0902  Last data filed at 8/16/2024 0459  Gross per 24 hour   Intake 1080 ml   Output 1300 ml   Net -220 ml          Telemetry:  sinus rhythm     Physical Exam:  The patient is asleep, in no distress.  Vital signs as noted above.  Head and neck revealed no carotid bruits or jugular venous distention.    Lungs clear.  No wheezing.  Breath sounds are normal bilaterally.  Heart normal first and second heart sounds.  No murmur. No precordial rub is present.  No gallop is present.  Abdomen soft.  Extremities with good peripheral pulses without any pedal edema.  Skin warm and " dry.  Musculoskeletal system is grossly normal.      Results Review:  I have personally reviewed the results from the time of this admission to 8/16/2024 09:02 EDT and agree with these findings:  [x]  Laboratory  []  Microbiology  [x]  Radiology  [x]  EKG/Telemetry   [x]  Cardiology/Vascular   []  Pathology  [x]  Old records  []  Other:    Most notable findings include:    Lab Results (last 24 hours)       Procedure Component Value Units Date/Time    CBC & Differential [[id number]]  (Abnormal) Collected: 08/16/24 0015    Specimen: Blood Updated: 08/16/24 0243    Narrative:      The following orders were created for panel order CBC & Differential.  Procedure                               Abnormality         Status                     ---------                               -----------         ------                     CBC Auto Differential[[id number]]        Abnormal            Final result               Scan Slide[[id number]]                                       Final result                 Please view results for these tests on the individual orders.    CBC Auto Differential [[id number]]  (Abnormal) Collected: 08/16/24 0015    Specimen: Blood Updated: 08/16/24 0243     WBC 5.30 10*3/mm3      RBC 2.70 10*6/mm3      Hemoglobin 9.3 g/dL      Hematocrit 28.8 %      .7 fL      MCH 34.4 pg      MCHC 32.3 g/dL      RDW 13.6 %      RDW-SD 53.5 fl      MPV 12.2 fL      Platelets 91 10*3/mm3     Narrative:      The previously reported component NRBC is no longer being reported. Previous result was 0.0 /100 WBC (Reference Range: 0.0-0.2 /100 WBC) on 8/16/2024 at 0135 EDT.    Scan Slide [[id number]] Collected: 08/16/24 0015    Specimen: Blood Updated: 08/16/24 0243     Scan Slide --     Comment: See Manual Differential Results       Manual Differential [[id number]] Collected: 08/16/24 0015    Specimen: Blood Updated: 08/16/24 0243     Neutrophil % 51.0 %      Lymphocyte % 43.0 %      Monocyte % 6.0 %      Neutrophils  Absolute 2.70 10*3/mm3      Lymphocytes Absolute 2.28 10*3/mm3      Monocytes Absolute 0.32 10*3/mm3      Anisocytosis Slight/1+     Macrocytes Slight/1+     WBC Morphology Normal     Platelet Estimate Decreased     Large Platelets Slight/1+    Comprehensive Metabolic Panel [[id number]]  (Abnormal) Collected: 08/16/24 0015    Specimen: Blood Updated: 08/16/24 0152     Glucose 119 mg/dL      BUN 9 mg/dL      Creatinine 0.70 mg/dL      Sodium 136 mmol/L      Potassium 4.0 mmol/L      Comment: Slight hemolysis detected by analyzer. Result may be falsely elevated.        Chloride 106 mmol/L      CO2 20.9 mmol/L      Calcium 8.4 mg/dL      Total Protein 6.4 g/dL      Albumin 2.5 g/dL      ALT (SGPT) 48 U/L      AST (SGOT) 83 U/L      Alkaline Phosphatase 137 U/L      Total Bilirubin 2.9 mg/dL      Globulin 3.9 gm/dL      A/G Ratio 0.6 g/dL      BUN/Creatinine Ratio 12.9     Anion Gap 9.1 mmol/L      eGFR 108.8 mL/min/1.73     Narrative:      GFR Normal >60  Chronic Kidney Disease <60  Kidney Failure <15      Magnesium [[id number]]  (Abnormal) Collected: 08/15/24 0325    Specimen: Blood Updated: 08/15/24 0905     Magnesium 1.5 mg/dL             Imaging Results (Last 24 Hours)       ** No results found for the last 24 hours. **            LAB RESULTS (LAST 7 DAYS)    CBC  Results from last 7 days   Lab Units 08/16/24  0015 08/15/24  0245 08/14/24  1925 08/11/24  0617 08/10/24  0605   WBC 10*3/mm3 5.30 3.80 4.56 6.75 5.17   RBC 10*6/mm3 2.70* 2.75* 2.90* 2.78* 2.56*   HEMOGLOBIN g/dL 9.3* 9.5* 9.9* 9.7* 9.0*   HEMATOCRIT % 28.8* 30.7* 31.2* 30.3* 27.9*   MCV fL 106.7* 111.6* 107.6* 109.0* 109.0*   PLATELETS 10*3/mm3 91* 76* 86* 89* 71*       BMP  Results from last 7 days   Lab Units 08/16/24  0015 08/15/24  0325 08/14/24  1925 08/12/24  0325 08/11/24  0617 08/10/24  2317 08/10/24  0605 08/09/24  1355   SODIUM mmol/L 136 138 136 134* 136  --  137  --    POTASSIUM mmol/L 4.0 3.8 4.0 3.5 3.9 4.0 3.5  --    CHLORIDE mmol/L 106  106 102 99 103  --  107  --    CO2 mmol/L 20.9* 24.5 25.4 27.1 23.5  --  23.0  --    BUN mg/dL 9 10 11 8 10  --  12  --    CREATININE mg/dL 0.70 0.67 0.69 0.76 0.72  --  0.71  --    GLUCOSE mg/dL 119* 115* 126* 104* 86  --  103*  --    MAGNESIUM mg/dL  --  1.5*  --   --   --   --   --  1.6       CMP   Results from last 7 days   Lab Units 08/16/24  0015 08/15/24  0325 08/14/24  1936 08/14/24  1925 08/12/24 0325 08/11/24  0617 08/10/24  2317 08/10/24  0605 08/09/24  1355   SODIUM mmol/L 136 138  --  136 134* 136  --  137  --    POTASSIUM mmol/L 4.0 3.8  --  4.0 3.5 3.9 4.0 3.5  --    CHLORIDE mmol/L 106 106  --  102 99 103  --  107  --    CO2 mmol/L 20.9* 24.5  --  25.4 27.1 23.5  --  23.0  --    BUN mg/dL 9 10  --  11 8 10  --  12  --    CREATININE mg/dL 0.70 0.67  --  0.69 0.76 0.72  --  0.71  --    GLUCOSE mg/dL 119* 115*  --  126* 104* 86  --  103*  --    ALBUMIN g/dL 2.5* 2.5*  --  2.6* 2.6* 2.5*  --  2.4* 2.6*   BILIRUBIN mg/dL 2.9* 3.3*  --  3.7* 3.7* 3.8*  --  3.1* 3.8*   ALK PHOS U/L 137* 137*  --  146* 149* 158*  --  150* 131*   AST (SGOT) U/L 83* 81*  --  89* 104* 107*  --  102* 103*   ALT (SGPT) U/L 48* 48*  --  51* 54* 54*  --  51* 53*   AMMONIA umol/L  --   --  82*  --   --   --   --   --   --        BNP        TROPONIN  Results from last 7 days   Lab Units 08/14/24  2233   HSTROP T ng/L <6       CoAg  Results from last 7 days   Lab Units 08/11/24  0617 08/09/24  1041   INR  1.49* 1.50*       Creatinine Clearance  Estimated Creatinine Clearance: 107.7 mL/min (by C-G formula based on SCr of 0.7 mg/dL).    ABG        Radiology  CT Angiogram Chest Pulmonary Embolism    Result Date: 8/14/2024  Impression: 1. Negative for pulmonary embolus. 2. No acute intrathoracic process. 3. Suspected cirrhotic liver with small upper abdominal ascites and splenomegaly. 4. Emphysema, coronary artery calcifications, and additional chronic findings above. Electronically Signed: Sage Sharma MD  8/14/2024 9:57 PM EDT   Workstation ID: [id number]    XR Chest 1 View    Result Date: 8/14/2024  Impression: No change from CT abdomen pelvis appearance of chest. Left lower lobe airspace opacity atelectasis versus pneumonia. Electronically Signed: Karen Salgado MD  8/14/2024 8:09 PM EDT  Workstation ID: [id number]       EKG  I personally viewed and interpreted the patient's EKG/Telemetry data:  ECG 12 Lead Chest Pain   Final Result   HEART RATE=44  bpm   RR Fncmagll=7533  ms   WY Tsfohbmj=569  ms   P Horizontal Axis=29  deg   P Front Axis=14  deg   QRSD Coctflqw=837  ms   QT [place]=566  ms   VOlI=598  ms   QRS Axis=38  deg   T Wave Axis=37  deg   - BORDERLINE ECG -   Bradycardia with irregular rate   Electronically Signed By: Checo Avila (JEREMIAH) 2024-08-15 08:23:59   Date and Time of Study:2024-08-14 19:12:50      Telemetry Scan   Final Result      Telemetry Scan   Final Result      Telemetry Scan   Final Result      Telemetry Scan   Final Result      Telemetry Scan   Final Result      Telemetry Scan   Final Result      Telemetry Scan   Final Result      Telemetry Scan   Final Result            Echocardiogram:    Results for orders placed during the hospital encounter of 08/08/24    Adult Transthoracic Echo Complete W/ Cont if Necessary Per Protocol    Interpretation Summary    Left ventricular systolic function is normal. Calculated left ventricular 3D EF = 61% Left ventricular ejection fraction appears to be 61 - 65%.    Left ventricular diastolic function is consistent with (grade II w/high LAP) pseudonormalization.    Mild to moderate aortic valve stenosis is present. Mean/peak gradient of 17/31 mmHg.  Aortic valve area 1.5 cm²    Estimated right ventricular systolic pressure from tricuspid regurgitation is normal (<35 mmHg).        Stress Test:         Cardiac Catheterization:  No results found for this or any previous visit.         Other:         ASSESSMENT & PLAN:    Principal Problem:    Bradycardia  Active Problems:    Alcoholic  cirrhosis of liver with ascites    Alcohol abuse    Chronic GERD    Major depressive disorder, recurrent episode, moderate degree    Tobacco abuse    Thrombocytopenia    Macrocytic anemia    Hyperammonemia      Arrhythmia/bradycardia  Questionable paroxysmal A-fib with slow ventricular rate  She remained in sinus rhythm throughout the previous hospital admission  Bradycardia is secondary to underlying liver disease.  Echocardiogram with preserved LV function, grade 2 diastolic dysfunction  TSH is normal  Unable to tolerate beta-blocker due to bradycardia  Good chronotropic response as assessed at bedside.  Heart rate increases to 70s and 80s with minimal movement.  Unable to tolerate anticoagulation due to esophageal varices, anemia, thrombocytopenia and high risk of bleeding.  14 day Holter monitor at the time of discharge.     Alcoholic cirrhosis of liver with ascites and Esophageal varices / Alcohol abuse  Anasarca   History of large volume paracentesis in the past  On lactulose and rifaximin for hyperammonemia      Hypotension  Likely secondary to liver disease   Currently on midodrine   Closely monitor blood pressure     Thrombocytopenia / Macrocytic anemia  Likely secondary to chronic liver disease  Platelets 91 and hemoglobin 9.3 today  Monitor CBC  Monitor for signs of bleeding     Chronic hepatitis C virus infection  Unsure if patient has received treatment in the past     Tobacco abuse  Smoking cessation counseling provided to the patient      Electronically signed by MARC Escobar, 08/16/24, 1:45 PM EDT.

## 2024-08-16 NOTE — PROGRESS NOTES
[facility] MEDICINE SERVICE  DAILY PROGRESS NOTE    NAME: Maria Elena Byrnes  : 1980  MRN: [id number]      LOS: 0 days     PROVIDER OF SERVICE: Paddy Castillo MD    Chief Complaint: Bradycardia    Subjective:     Interval History:  History taken from: patient    No acute events, patient reported     Review of Systems:   Review of Systems    Objective:     Vital Signs  Temp:  [97.8 °F (36.6 °C)-98.5 °F (36.9 °C)] 97.9 °F (36.6 °C)  Heart Rate:  [63-83] 70  Resp:  [14-20] 14  BP: ()/(46-64) 88/54   Body mass index is 30.23 kg/m².    Physical Exam  General Appearance:  aaox3   Head:  Atrumatic normocephalic   Eyes:        No sclera icterus   Neck: Normal range of motion, nontender   Pulm: Clear to auscultation   Cardio: Normal heart rate, regular rhythm on my exam   Extremities: No edema of the bilateral lower extremities   Abdomen: Soft, nontender   /Renal: No suprapubic tenderness   Musculoskeletal: Moves all extremities       Neurologic: Alert oriented x 3, no focal logical deficits           Scheduled Meds   dicyclomine, 20 mg, Oral, TID  folic acid, 1 mg, Oral, Daily  lactulose, 30 g, Oral, BID  midodrine, 10 mg, Oral, Q8H  pantoprazole, 40 mg, Oral, Q AM  riFAXIMin, 550 mg, Oral, Q12H  ursodiol, 300 mg, Oral, BID       PRN Meds     docusate sodium    hydrOXYzine    [COMPLETED] Insert Peripheral IV **AND** sodium chloride    tiZANidine    traMADol    traZODone   Infusions         Diagnostic Data    Results from last 7 days   Lab Units 24  0015   WBC 10*3/mm3 5.30   HEMOGLOBIN g/dL 9.3*   HEMATOCRIT % 28.8*   PLATELETS 10*3/mm3 91*   GLUCOSE mg/dL 119*   CREATININE mg/dL 0.70   BUN mg/dL 9   SODIUM mmol/L 136   POTASSIUM mmol/L 4.0   AST (SGOT) U/L 83*   ALT (SGPT) U/L 48*   ALK PHOS U/L 137*   BILIRUBIN mg/dL 2.9*   ANION GAP mmol/L 9.1       CT Angiogram Chest Pulmonary Embolism    Result Date: 2024  Impression: 1. Negative for pulmonary embolus. 2. No acute intrathoracic  process. 3. Suspected cirrhotic liver with small upper abdominal ascites and splenomegaly. 4. Emphysema, coronary artery calcifications, and additional chronic findings above. Electronically Signed: Sage Sharma MD  8/14/2024 9:57 PM EDT  Workstation ID: [id number]    XR Chest 1 View    Result Date: 8/14/2024  Impression: No change from CT abdomen pelvis appearance of chest. Left lower lobe airspace opacity atelectasis versus pneumonia. Electronically Signed: Karen Salgado MD  8/14/2024 8:09 PM EDT  Workstation ID: [id number]       I reviewed the patient's new clinical results.    Assessment/Plan:     Active and Resolved Problems  Active Hospital Problems    Diagnosis  POA    **Bradycardia [R00.1]  Yes    Hyperammonemia [E72.20]  Yes    Thrombocytopenia [D69.6]  Yes    Macrocytic anemia [D53.9]  Yes    Chronic GERD [K21.9]  Yes    Alcoholic cirrhosis of liver with ascites [K70.31]  Yes    Major depressive disorder, recurrent episode, moderate degree [F33.1]  Yes    Alcohol abuse [F10.10]  Yes    Tobacco abuse [Z72.0]  Yes      Resolved Hospital Problems   No resolved problems to display.       Plan:   -TSH on 8/9/24: normal, will repeat, not on meds that could case jimbo  -Cardiology following, plan for holter on DC  -Continue midodrine, rifaxmin, PPI, lactulose  -Bradycardia overnight, will follow with cardiology if any other reccs, otherwise will plan for dc  -AM Labs     VTE Prophylaxis:  Mechanical VTE prophylaxis orders are present.         Code status is   Code Status and Medical Interventions: CPR (Attempt to Resuscitate); Full Support   Ordered at: 08/14/24 2236     Code Status (Patient has no pulse and is not breathing):    CPR (Attempt to Resuscitate)     Medical Interventions (Patient has pulse or is breathing):    Full Support         Time: 30 minutes    Part of this note may be an electronic transcription/translation of spoken language to printed text using the Dragon Dictation System.    Signature:  Electronically signed by Paddy Castillo MD, 08/16/24, 08:41 EDT.  [facility]

## 2024-08-16 NOTE — PLAN OF CARE
Goal Outcome Evaluation: Patient alert and oriented, up ad roseanne, hypotensive at times, doctor notified, see orders, slept most of day, call light within reach

## 2024-08-16 NOTE — PLAN OF CARE
Goal Outcome Evaluation:      Pt has had prn pain medication and it has been helpful. Pt has prn atarax ordered and has had one dose and seemed to help with anxiety. Pt has received scheduled dose of midodrine with increased blood pressure noted.

## 2024-08-17 LAB
ALBUMIN SERPL-MCNC: 2.7 G/DL (ref 3.5–5.2)
ALBUMIN/GLOB SERPL: 0.7 G/DL
ALP SERPL-CCNC: 137 U/L (ref 39–117)
ALT SERPL W P-5'-P-CCNC: 46 U/L (ref 1–33)
ANION GAP SERPL CALCULATED.3IONS-SCNC: 9.1 MMOL/L (ref 5–15)
AST SERPL-CCNC: 76 U/L (ref 1–32)
BASOPHILS # BLD MANUAL: 0.11 10*3/MM3 (ref 0–0.2)
BASOPHILS NFR BLD MANUAL: 2 % (ref 0–1.5)
BILIRUB SERPL-MCNC: 3 MG/DL (ref 0–1.2)
BUN SERPL-MCNC: 7 MG/DL (ref 6–20)
BUN/CREAT SERPL: 8.6 (ref 7–25)
CALCIUM SPEC-SCNC: 8.5 MG/DL (ref 8.6–10.5)
CHLORIDE SERPL-SCNC: 105 MMOL/L (ref 98–107)
CO2 SERPL-SCNC: 21.9 MMOL/L (ref 22–29)
CREAT SERPL-MCNC: 0.81 MG/DL (ref 0.57–1)
DEPRECATED RDW RBC AUTO: 53.5 FL (ref 37–54)
EGFRCR SERPLBLD CKD-EPI 2021: 91.4 ML/MIN/1.73
EOSINOPHIL # BLD MANUAL: 0.05 10*3/MM3 (ref 0–0.4)
EOSINOPHIL NFR BLD MANUAL: 1 % (ref 0.3–6.2)
ERYTHROCYTE [DISTWIDTH] IN BLOOD BY AUTOMATED COUNT: 13.7 % (ref 12.3–15.4)
GLOBULIN UR ELPH-MCNC: 4 GM/DL
GLUCOSE SERPL-MCNC: 128 MG/DL (ref 65–99)
HCT VFR BLD AUTO: 29.3 % (ref 34–46.6)
HGB BLD-MCNC: 9.5 G/DL (ref 12–15.9)
LARGE PLATELETS: ABNORMAL
LYMPHOCYTES # BLD MANUAL: 2.01 10*3/MM3 (ref 0.7–3.1)
LYMPHOCYTES NFR BLD MANUAL: 11 % (ref 5–12)
MAGNESIUM SERPL-MCNC: 1.6 MG/DL (ref 1.6–2.6)
MCH RBC QN AUTO: 34.4 PG (ref 26.6–33)
MCHC RBC AUTO-ENTMCNC: 32.4 G/DL (ref 31.5–35.7)
MCV RBC AUTO: 106.2 FL (ref 79–97)
MONOCYTES # BLD: 0.6 10*3/MM3 (ref 0.1–0.9)
NEUTROPHILS # BLD AUTO: 2.66 10*3/MM3 (ref 1.7–7)
NEUTROPHILS NFR BLD MANUAL: 49 % (ref 42.7–76)
PHOSPHATE SERPL-MCNC: 3.7 MG/DL (ref 2.5–4.5)
PLATELET # BLD AUTO: 86 10*3/MM3 (ref 140–450)
PMV BLD AUTO: 11.2 FL (ref 6–12)
POTASSIUM SERPL-SCNC: 3.9 MMOL/L (ref 3.5–5.2)
PROT SERPL-MCNC: 6.7 G/DL (ref 6–8.5)
RBC # BLD AUTO: 2.76 10*6/MM3 (ref 3.77–5.28)
RBC MORPH BLD: NORMAL
SCAN SLIDE: NORMAL
SMALL PLATELETS BLD QL SMEAR: ABNORMAL
SODIUM SERPL-SCNC: 136 MMOL/L (ref 136–145)
VARIANT LYMPHS NFR BLD MANUAL: 33 % (ref 19.6–45.3)
VARIANT LYMPHS NFR BLD MANUAL: 4 % (ref 0–5)
WBC MORPH BLD: NORMAL
WBC NRBC COR # BLD AUTO: 5.43 10*3/MM3 (ref 3.4–10.8)

## 2024-08-17 PROCEDURE — G0378 HOSPITAL OBSERVATION PER HR: HCPCS

## 2024-08-17 RX ORDER — HYDROXYZINE HYDROCHLORIDE 25 MG/1
25 TABLET, FILM COATED ORAL 3 TIMES DAILY PRN
Status: CANCELLED | OUTPATIENT
Start: 2024-08-17

## 2024-08-17 RX ADMIN — LACTULOSE 30 G: 10 SOLUTION ORAL at 08:43

## 2024-08-17 RX ADMIN — MIDODRINE HYDROCHLORIDE 10 MG: 5 TABLET ORAL at 05:12

## 2024-08-17 RX ADMIN — URSODIOL 300 MG: 300 CAPSULE ORAL at 08:42

## 2024-08-17 RX ADMIN — TRAMADOL HYDROCHLORIDE 50 MG: 50 TABLET ORAL at 23:35

## 2024-08-17 RX ADMIN — HYDROXYZINE HYDROCHLORIDE 25 MG: 25 TABLET, FILM COATED ORAL at 19:44

## 2024-08-17 RX ADMIN — RIFAXIMIN 550 MG: 550 TABLET ORAL at 08:42

## 2024-08-17 RX ADMIN — PANTOPRAZOLE SODIUM 40 MG: 40 TABLET, DELAYED RELEASE ORAL at 05:12

## 2024-08-17 RX ADMIN — DICYCLOMINE HYDROCHLORIDE 20 MG: 10 CAPSULE ORAL at 15:33

## 2024-08-17 RX ADMIN — LACTULOSE 20 G: 10 SOLUTION ORAL at 23:13

## 2024-08-17 RX ADMIN — URSODIOL 300 MG: 300 CAPSULE ORAL at 23:13

## 2024-08-17 RX ADMIN — Medication 10 ML: at 08:44

## 2024-08-17 RX ADMIN — MIDODRINE HYDROCHLORIDE 10 MG: 5 TABLET ORAL at 15:33

## 2024-08-17 RX ADMIN — HYDROXYZINE HYDROCHLORIDE 25 MG: 25 TABLET, FILM COATED ORAL at 12:41

## 2024-08-17 RX ADMIN — TRAMADOL HYDROCHLORIDE 50 MG: 50 TABLET ORAL at 14:02

## 2024-08-17 RX ADMIN — TRAZODONE HYDROCHLORIDE 50 MG: 50 TABLET ORAL at 23:16

## 2024-08-17 RX ADMIN — MIDODRINE HYDROCHLORIDE 10 MG: 5 TABLET ORAL at 23:17

## 2024-08-17 RX ADMIN — TRAMADOL HYDROCHLORIDE 50 MG: 50 TABLET ORAL at 05:12

## 2024-08-17 RX ADMIN — DICYCLOMINE HYDROCHLORIDE 20 MG: 10 CAPSULE ORAL at 08:42

## 2024-08-17 RX ADMIN — RIFAXIMIN 550 MG: 550 TABLET ORAL at 23:16

## 2024-08-17 RX ADMIN — DICYCLOMINE HYDROCHLORIDE 20 MG: 10 CAPSULE ORAL at 23:16

## 2024-08-17 RX ADMIN — FOLIC ACID 1 MG: 1 TABLET ORAL at 08:42

## 2024-08-17 NOTE — PROGRESS NOTES
[facility] MEDICINE SERVICE  DAILY PROGRESS NOTE    NAME: Maria Elena Byrnes  : 1980  MRN: [id number]      LOS: 0 days     PROVIDER OF SERVICE: Paddy Castillo MD    Chief Complaint: Bradycardia    Subjective:     Interval History:  History taken from: patient    No events, patient ambulating fine today, was using bathroom, denies dizziness, chest pain, SOB.     Review of Systems:   Review of Systems    Objective:     Vital Signs  Temp:  [97.9 °F (36.6 °C)-98.5 °F (36.9 °C)] 98.1 °F (36.7 °C)  Heart Rate:  [73-85] 85  Resp:  [14-16] 15  BP: ()/(49-69) 112/61   Body mass index is 30.3 kg/m².    Physical Exam  General Appearance:  aaox3   Head:  Atrumatic normocephalic   Eyes:        No sclera icterus   Neck: Normal range of motion, nontender   Pulm: Clear to auscultation   Cardio: Normal heart rate, regular rhythm on my exam   Extremities: No edema of the bilateral lower extremities   Abdomen: Soft, nontender   /Renal: No suprapubic tenderness   Musculoskeletal: Moves all extremities         Neurologic: Alert oriented x 3, no focal logical deficits                Scheduled Meds   dicyclomine, 20 mg, Oral, TID  folic acid, 1 mg, Oral, Daily  lactulose, 30 g, Oral, BID  midodrine, 10 mg, Oral, Q8H  pantoprazole, 40 mg, Oral, Q AM  riFAXIMin, 550 mg, Oral, Q12H  ursodiol, 300 mg, Oral, BID       PRN Meds     docusate sodium    hydrOXYzine    [COMPLETED] Insert Peripheral IV **AND** sodium chloride    tiZANidine    traMADol    traZODone   Infusions         Diagnostic Data    Results from last 7 days   Lab Units 24  2333   WBC 10*3/mm3 5.43   HEMOGLOBIN g/dL 9.5*   HEMATOCRIT % 29.3*   PLATELETS 10*3/mm3 86*   GLUCOSE mg/dL 128*   CREATININE mg/dL 0.81   BUN mg/dL 7   SODIUM mmol/L 136   POTASSIUM mmol/L 3.9   AST (SGOT) U/L 76*   ALT (SGPT) U/L 46*   ALK PHOS U/L 137*   BILIRUBIN mg/dL 3.0*   ANION GAP mmol/L 9.1       No radiology results for the last day      I reviewed the patient's new  clinical results.    Assessment/Plan:     Active and Resolved Problems  Active Hospital Problems    Diagnosis  POA    **Bradycardia [R00.1]  Yes    Hyperammonemia [E72.20]  Yes    Thrombocytopenia [D69.6]  Yes    Macrocytic anemia [D53.9]  Yes    Chronic GERD [K21.9]  Yes    Alcoholic cirrhosis of liver with ascites [K70.31]  Yes    Major depressive disorder, recurrent episode, moderate degree [F33.1]  Yes    Alcohol abuse [F10.10]  Yes    Tobacco abuse [Z72.0]  Yes      Resolved Hospital Problems   No resolved problems to display.          Plan:   -TSH on 8/9/24: normal, will repeat, not on meds that could case jimbo  -Cardiology following, plan for holter on DC  -Continue midodrine, rifaxmin, PPI, lactulose  -Bradycardia overnight, will follow with cardiology if any other reccs, otherwise will plan for dc  -AM Labs      8/17:  -discussed with the patient, she stated she will obtain referral from her PCP on arrival to [facility], and also stated her PCP is good at getting in to pcp for evaluation  -[employer] will need to bring her belongings, and will discuss with case management in terms of transporting patient to [place], likely this will happen in AM   -Plan to DC in AM, cardiac monitor on dc  -Plan discussed with the patient, patient agreed with the plan.   -AM Labs     VTE Prophylaxis:  Mechanical VTE prophylaxis orders are present.         Code status is   Code Status and Medical Interventions: CPR (Attempt to Resuscitate); Full Support   Ordered at: 08/14/24 2236     Code Status (Patient has no pulse and is not breathing):    CPR (Attempt to Resuscitate)     Medical Interventions (Patient has pulse or is breathing):    Full Support       Time: 30 minutes    Part of this note may be an electronic transcription/translation of spoken language to printed text using the Dragon Dictation System.    Signature: Electronically signed by Paddy Castillo MD, 08/17/24, 16:32 EDT.  [facility] Hospitalist Team

## 2024-08-17 NOTE — PLAN OF CARE
Problem: Adult Inpatient Plan of Care  Goal: Absence of Hospital-Acquired Illness or Injury  Intervention: Identify and Manage Fall Risk  Recent Flowsheet Documentation  Taken 8/17/2024 1400 by Polly Thomson RN  Safety Promotion/Fall Prevention:   safety round/check completed   room organization consistent   clutter free environment maintained  Taken 8/17/2024 1200 by Polly Thomson RN  Safety Promotion/Fall Prevention:   safety round/check completed   room organization consistent   clutter free environment maintained  Taken 8/17/2024 1000 by Polly Thomson RN  Safety Promotion/Fall Prevention:   safety round/check completed   room organization consistent   clutter free environment maintained  Taken 8/17/2024 0800 by Polly Thomson RN  Safety Promotion/Fall Prevention:   safety round/check completed   room organization consistent   clutter free environment maintained     Problem: Adult Inpatient Plan of Care  Goal: Optimal Comfort and Wellbeing  Intervention: Provide Person-Centered Care  Recent Flowsheet Documentation  Taken 8/17/2024 1200 by Polly Thomson RN  Trust Relationship/Rapport:   care explained   questions answered  Taken 8/17/2024 0800 by Polly Thomson RN  Trust Relationship/Rapport:   care explained   questions encouraged     Problem: Pain Acute  Goal: Acceptable Pain Control and Functional Ability  Intervention: Prevent or Manage Pain  Recent Flowsheet Documentation  Taken 8/17/2024 1400 by Polly Thomson RN  Medication Review/Management: medications reviewed  Taken 8/17/2024 1200 by Polly Thomson RN  Medication Review/Management: medications reviewed  Taken 8/17/2024 1000 by Polly Thomson RN  Medication Review/Management: medications reviewed  Taken 8/17/2024 0800 by Polly Thomson RN  Medication Review/Management: medications reviewed  Intervention: Optimize Psychosocial Wellbeing  Recent Flowsheet Documentation  Taken 8/17/2024 1200 by Polly Thomson RN  Diversional Activities:  television  Taken 8/17/2024 0800 by Polly Thomson RN  Diversional Activities:   television   smartphone     Problem: Fall Injury Risk  Goal: Absence of Fall and Fall-Related Injury  Intervention: Identify and Manage Contributors  Recent Flowsheet Documentation  Taken 8/17/2024 1400 by Polly Thomson RN  Medication Review/Management: medications reviewed  Taken 8/17/2024 1200 by Polly Thomson RN  Medication Review/Management: medications reviewed  Taken 8/17/2024 1000 by Polly Thomson RN  Medication Review/Management: medications reviewed  Taken 8/17/2024 0800 by Polly Thomson RN  Medication Review/Management: medications reviewed  Intervention: Promote Injury-Free Environment  Recent Flowsheet Documentation  Taken 8/17/2024 1400 by Polly Thomson RN  Safety Promotion/Fall Prevention:   safety round/check completed   room organization consistent   clutter free environment maintained  Taken 8/17/2024 1200 by Polly Thomson RN  Safety Promotion/Fall Prevention:   safety round/check completed   room organization consistent   clutter free environment maintained  Taken 8/17/2024 1000 by Polly Thomson RN  Safety Promotion/Fall Prevention:   safety round/check completed   room organization consistent   clutter free environment maintained  Taken 8/17/2024 0800 by Polly Thomson RN  Safety Promotion/Fall Prevention:   safety round/check completed   room organization consistent   clutter free environment maintained     Problem: Pain Acute  Goal: Acceptable Pain Control and Functional Ability  Intervention: Optimize Psychosocial Wellbeing  Recent Flowsheet Documentation  Taken 8/17/2024 1200 by Polly Thomson RN  Diversional Activities: television  Taken 8/17/2024 0800 by Polly Thomson RN  Diversional Activities:   television   smartphone     Problem: Pain Acute  Goal: Acceptable Pain Control and Functional Ability  Intervention: Optimize Psychosocial Wellbeing  Recent Flowsheet Documentation  Taken 8/17/2024  1200 by Polly Thomson, RN  Diversional Activities: television  Taken 8/17/2024 0800 by Polly Thomson, RN  Diversional Activities:   television   smartphone   Goal Outcome Evaluation: Patient alert and oriented, pain medication and anxiety medication given, see MAR, VSS, call light within reach

## 2024-08-17 NOTE — PLAN OF CARE
Goal Outcome Evaluation:          Problem: Adult Inpatient Plan of Care  Goal: Plan of Care Review  Outcome: Ongoing, Progressing  Goal: Patient-Specific Goal (Individualized)  Outcome: Ongoing, Progressing  Goal: Absence of Hospital-Acquired Illness or Injury  Outcome: Ongoing, Progressing  Intervention: Identify and Manage Fall Risk  Recent Flowsheet Documentation  Taken 8/17/2024 0200 by Jennifer Alvarez RN  Safety Promotion/Fall Prevention: safety round/check completed  Taken 8/17/2024 0000 by Jennifer Alvarez RN  Safety Promotion/Fall Prevention: safety round/check completed  Taken 8/16/2024 2200 by Jennifer Alvarez RN  Safety Promotion/Fall Prevention: safety round/check completed  Taken 8/16/2024 2000 by Jennifer Alvarez RN  Safety Promotion/Fall Prevention: safety round/check completed  Intervention: Prevent Skin Injury  Recent Flowsheet Documentation  Taken 8/17/2024 0000 by Jennifer Alvarez RN  Body Position: position changed independently  Skin Protection:   adhesive use limited   tubing/devices free from skin contact  Taken 8/16/2024 2000 by Jennifer Alvarez RN  Body Position: position changed independently  Skin Protection:   adhesive use limited   tubing/devices free from skin contact  Intervention: Prevent and Manage VTE (Venous Thromboembolism) Risk  Recent Flowsheet Documentation  Taken 8/17/2024 0000 by Jennifer Alvarez RN  Activity Management: up ad roseanne  VTE Prevention/Management:   sequential compression devices off   patient refused intervention  Range of Motion: active ROM (range of motion) encouraged  Taken 8/16/2024 2000 by Jennifer Alvarez RN  Activity Management: up ad roseanne  VTE Prevention/Management:   sequential compression devices off   patient refused intervention  Range of Motion: active ROM (range of motion) encouraged  Intervention: Prevent Infection  Recent Flowsheet Documentation  Taken 8/17/2024 0200 by Jennifer Alvarez RN  Infection Prevention:   single patient room provided   rest/sleep  promoted   personal protective equipment utilized   hand hygiene promoted  Taken 8/17/2024 0000 by Jennifer Alvarez RN  Infection Prevention:   single patient room provided   rest/sleep promoted   personal protective equipment utilized   hand hygiene promoted  Taken 8/16/2024 2200 by eJnnifer Alvarez RN  Infection Prevention:   single patient room provided   rest/sleep promoted   personal protective equipment utilized   hand hygiene promoted  Taken 8/16/2024 2000 by Jennifer Alvarez RN  Infection Prevention:   single patient room provided   rest/sleep promoted   personal protective equipment utilized   hand hygiene promoted  Goal: Optimal Comfort and Wellbeing  Outcome: Ongoing, Progressing  Intervention: Provide Person-Centered Care  Recent Flowsheet Documentation  Taken 8/17/2024 0000 by Jennifer Alvarez RN  Trust Relationship/Rapport:   care explained   choices provided  Taken 8/16/2024 2000 by Jennifer Alvarez RN  Trust Relationship/Rapport:   care explained   choices provided  Goal: Readiness for Transition of Care  Outcome: Ongoing, Progressing     Problem: Pain Acute  Goal: Acceptable Pain Control and Functional Ability  Outcome: Ongoing, Progressing  Intervention: Prevent or Manage Pain  Recent Flowsheet Documentation  Taken 8/17/2024 0200 by Jennifer Alvarez RN  Medication Review/Management: medications reviewed  Taken 8/17/2024 0000 by Jennifer Alvarez RN  Medication Review/Management: medications reviewed  Taken 8/16/2024 2200 by Jennifer Alvarez RN  Medication Review/Management: medications reviewed  Taken 8/16/2024 2000 by Jennifer Alvarez RN  Medication Review/Management: medications reviewed  Intervention: Optimize Psychosocial Wellbeing  Recent Flowsheet Documentation  Taken 8/17/2024 0000 by Jennifer Alvarez RN  Supportive Measures: active listening utilized  Diversional Activities:   television   smartphone  Taken 8/16/2024 2000 by Jennifer Alvarez RN  Supportive Measures: active listening  utilized  Diversional Activities:   smartphone   individual hobbies     Problem: Fall Injury Risk  Goal: Absence of Fall and Fall-Related Injury  Outcome: Ongoing, Progressing  Intervention: Identify and Manage Contributors  Recent Flowsheet Documentation  Taken 8/17/2024 0200 by Jennifer Alvarez RN  Medication Review/Management: medications reviewed  Taken 8/17/2024 0000 by Jennifer Alvarez RN  Medication Review/Management: medications reviewed  Self-Care Promotion: independence encouraged  Taken 8/16/2024 2200 by Jennifer Alvarez RN  Medication Review/Management: medications reviewed  Taken 8/16/2024 2000 by Jennifer Alvarez RN  Medication Review/Management: medications reviewed  Self-Care Promotion: independence encouraged  Intervention: Promote Injury-Free Environment  Recent Flowsheet Documentation  Taken 8/17/2024 0200 by Jennifer Alvarez RN  Safety Promotion/Fall Prevention: safety round/check completed  Taken 8/17/2024 0000 by Jennifer Alvarez RN  Safety Promotion/Fall Prevention: safety round/check completed  Taken 8/16/2024 2200 by Jennifer Alvarez RN  Safety Promotion/Fall Prevention: safety round/check completed  Taken 8/16/2024 2000 by Jennifer Alvarez, RN  Safety Promotion/Fall Prevention: safety round/check completed

## 2024-08-18 ENCOUNTER — READMISSION MANAGEMENT (OUTPATIENT)
Dept: CALL CENTER | Facility: HOSPITAL | Age: 44
End: 2024-08-18
Payer: MEDICAID

## 2024-08-18 VITALS
OXYGEN SATURATION: 90 % | SYSTOLIC BLOOD PRESSURE: 112 MMHG | HEART RATE: 93 BPM | DIASTOLIC BLOOD PRESSURE: 58 MMHG | HEIGHT: 65 IN | WEIGHT: 182.1 LBS | BODY MASS INDEX: 30.34 KG/M2 | TEMPERATURE: 98.1 F | RESPIRATION RATE: 13 BRPM

## 2024-08-18 LAB
ALBUMIN SERPL-MCNC: 2.6 G/DL (ref 3.5–5.2)
ALBUMIN/GLOB SERPL: 0.6 G/DL
ALP SERPL-CCNC: 132 U/L (ref 39–117)
ALT SERPL W P-5'-P-CCNC: 47 U/L (ref 1–33)
ANION GAP SERPL CALCULATED.3IONS-SCNC: 9.2 MMOL/L (ref 5–15)
AST SERPL-CCNC: 73 U/L (ref 1–32)
BASOPHILS # BLD AUTO: 0.04 10*3/MM3 (ref 0–0.2)
BASOPHILS NFR BLD AUTO: 1 % (ref 0–1.5)
BILIRUB SERPL-MCNC: 3.5 MG/DL (ref 0–1.2)
BUN SERPL-MCNC: 6 MG/DL (ref 6–20)
BUN/CREAT SERPL: 10 (ref 7–25)
CALCIUM SPEC-SCNC: 8.4 MG/DL (ref 8.6–10.5)
CHLORIDE SERPL-SCNC: 103 MMOL/L (ref 98–107)
CO2 SERPL-SCNC: 19.8 MMOL/L (ref 22–29)
CREAT SERPL-MCNC: 0.6 MG/DL (ref 0.57–1)
DEPRECATED RDW RBC AUTO: 51.2 FL (ref 37–54)
EGFRCR SERPLBLD CKD-EPI 2021: 113 ML/MIN/1.73
EOSINOPHIL # BLD AUTO: 0.09 10*3/MM3 (ref 0–0.4)
EOSINOPHIL NFR BLD AUTO: 2.2 % (ref 0.3–6.2)
ERYTHROCYTE [DISTWIDTH] IN BLOOD BY AUTOMATED COUNT: 13.2 % (ref 12.3–15.4)
GLOBULIN UR ELPH-MCNC: 4.3 GM/DL
GLUCOSE SERPL-MCNC: 114 MG/DL (ref 65–99)
HCT VFR BLD AUTO: 30.8 % (ref 34–46.6)
HGB BLD-MCNC: 9.9 G/DL (ref 12–15.9)
IMM GRANULOCYTES # BLD AUTO: 0.01 10*3/MM3 (ref 0–0.05)
IMM GRANULOCYTES NFR BLD AUTO: 0.2 % (ref 0–0.5)
LYMPHOCYTES # BLD AUTO: 1.66 10*3/MM3 (ref 0.7–3.1)
LYMPHOCYTES NFR BLD AUTO: 41.2 % (ref 19.6–45.3)
MAGNESIUM SERPL-MCNC: 1.5 MG/DL (ref 1.6–2.6)
MCH RBC QN AUTO: 33.4 PG (ref 26.6–33)
MCHC RBC AUTO-ENTMCNC: 32.1 G/DL (ref 31.5–35.7)
MCV RBC AUTO: 104.1 FL (ref 79–97)
MONOCYTES # BLD AUTO: 0.45 10*3/MM3 (ref 0.1–0.9)
MONOCYTES NFR BLD AUTO: 11.2 % (ref 5–12)
NEUTROPHILS NFR BLD AUTO: 1.78 10*3/MM3 (ref 1.7–7)
NEUTROPHILS NFR BLD AUTO: 44.2 % (ref 42.7–76)
NRBC BLD AUTO-RTO: 0 /100 WBC (ref 0–0.2)
PHOSPHATE SERPL-MCNC: 3.4 MG/DL (ref 2.5–4.5)
PLATELET # BLD AUTO: 77 10*3/MM3 (ref 140–450)
PMV BLD AUTO: 11.9 FL (ref 6–12)
POTASSIUM SERPL-SCNC: 3.8 MMOL/L (ref 3.5–5.2)
PROT SERPL-MCNC: 6.9 G/DL (ref 6–8.5)
RBC # BLD AUTO: 2.96 10*6/MM3 (ref 3.77–5.28)
SODIUM SERPL-SCNC: 132 MMOL/L (ref 136–145)
WBC NRBC COR # BLD AUTO: 4.03 10*3/MM3 (ref 3.4–10.8)

## 2024-08-18 PROCEDURE — 83735 ASSAY OF MAGNESIUM: CPT | Performed by: STUDENT IN AN ORGANIZED HEALTH CARE EDUCATION/TRAINING PROGRAM

## 2024-08-18 PROCEDURE — 80053 COMPREHEN METABOLIC PANEL: CPT | Performed by: INTERNAL MEDICINE

## 2024-08-18 PROCEDURE — 85025 COMPLETE CBC W/AUTO DIFF WBC: CPT | Performed by: NURSE PRACTITIONER

## 2024-08-18 PROCEDURE — 97161 PT EVAL LOW COMPLEX 20 MIN: CPT

## 2024-08-18 PROCEDURE — G0378 HOSPITAL OBSERVATION PER HR: HCPCS

## 2024-08-18 PROCEDURE — 84100 ASSAY OF PHOSPHORUS: CPT | Performed by: STUDENT IN AN ORGANIZED HEALTH CARE EDUCATION/TRAINING PROGRAM

## 2024-08-18 RX ORDER — PANTOPRAZOLE SODIUM 40 MG/1
40 TABLET, DELAYED RELEASE ORAL
Qty: 14 TABLET | Refills: 0 | Status: SHIPPED | OUTPATIENT
Start: 2024-08-18 | End: 2024-09-01

## 2024-08-18 RX ADMIN — DICYCLOMINE HYDROCHLORIDE 20 MG: 10 CAPSULE ORAL at 08:28

## 2024-08-18 RX ADMIN — LACTULOSE 30 G: 10 SOLUTION ORAL at 08:28

## 2024-08-18 RX ADMIN — URSODIOL 300 MG: 300 CAPSULE ORAL at 08:28

## 2024-08-18 RX ADMIN — TRAMADOL HYDROCHLORIDE 50 MG: 50 TABLET ORAL at 08:46

## 2024-08-18 RX ADMIN — Medication 10 ML: at 08:29

## 2024-08-18 RX ADMIN — FOLIC ACID 1 MG: 1 TABLET ORAL at 08:28

## 2024-08-18 RX ADMIN — PANTOPRAZOLE SODIUM 40 MG: 40 TABLET, DELAYED RELEASE ORAL at 07:42

## 2024-08-18 RX ADMIN — RIFAXIMIN 550 MG: 550 TABLET ORAL at 08:28

## 2024-08-18 RX ADMIN — MIDODRINE HYDROCHLORIDE 10 MG: 5 TABLET ORAL at 08:28

## 2024-08-18 NOTE — PLAN OF CARE
Goal Outcome Evaluation:              Outcome Evaluation: Pt to discharge home to [place].  Transport arranged by [facility].  Verbalized understanding of d/c orders.  Pt aware she must see PCP and arrange for card and holter monitor.  IV removed intact. Pt to d/c to [facility].

## 2024-08-18 NOTE — PLAN OF CARE
Goal Outcome Evaluation:  Plan of Care Reviewed With: patient           Outcome Evaluation: 44 yo female admitted from [facility] addiction treatment [place] with bradycardia.  Pt is from [place] and reports that is were her fiance and family live.  Pt does well with all mobility and is safe to be up ad roseanne.  No further skilled PT needs indicated at this time.      Anticipated Discharge Disposition (PT): home

## 2024-08-18 NOTE — CASE MANAGEMENT/SOCIAL WORK
Continued Stay Note  [facility]     Patient Name: Maria Elena Byrnes  MRN: [id number]  Today's Date: 8/18/2024    Admit Date: 8/14/2024    Plan: Anticipate return to [facility] to arrange transport/pick-up of belongings at discharge.   Discharge Plan       Row Name 08/18/24 1311       Plan    Plan Comments CM contacted [facility] rep (Radha Mcdermott, [phone]) to notify of dc orders. Per Radha, she will arrange for transport today. CM phone number and unit number provided. RN updated.     Kavita Loyola RN BSN  Weekend   [facility]  Phone: [phone]  Fax: [phone]

## 2024-08-18 NOTE — CASE MANAGEMENT/SOCIAL WORK
Case Management Discharge Note      Final Note: Home to [place], [place]     Transportation Services  Other: Other ([facility] to provide transport)    Final Discharge Disposition Code: 01 - home or self-care

## 2024-08-18 NOTE — OUTREACH NOTE
Prep Survey      Flowsheet Row Responses   Spiritism facility patient discharged from? [facility]   Is LACE score < 7 ? No   Eligibility Not Eligible   What are the reasons patient is not eligible? Behavioral Health  [return to [facility] facility]   Does the patient have one of the following disease processes/diagnoses(primary or secondary)? Other   Prep survey completed? Yes            Tsering PRICE - Registered Nurse

## 2024-08-18 NOTE — THERAPY EVALUATION
Patient Name: Maria Elena Byrnes  : 1980    MRN: [id number]                              Today's Date: 2024       Admit Date: 2024    Visit Dx:     ICD-10-CM ICD-9-CM   1. Symptomatic bradycardia  R00.1 427.89   2. Shortness of breath  R06.02 786.05   3. Chest pain, unspecified type  R07.9 786.50     Patient Active Problem List   Diagnosis    Alcoholic cirrhosis of liver with ascites    Arrhythmia    Alcohol abuse    Anemia of chronic disease    Chronic GERD    Chronic hepatitis C virus infection    Major depressive disorder, recurrent episode, moderate degree    Lesion of vocal fold    Neuropathy    Pelvic congestion syndrome    Portal vein thrombosis    Tobacco abuse    Esophageal varices    Anasarca    Thrombocytopenia    Macrocytic anemia    Hypotension    Bradycardia    Hyperammonemia     Past Medical History:   Diagnosis Date    Alcohol abuse 2022    Anemia of chronic disease 2024    Chronic GERD 2024    Chronic hepatitis C virus infection 2022    HAVP Negative 2022     HEPBSAG Negative 2022     HEPBCAB Negative 2022     HEPC10 Positive (A) 2022             Cirrhosis     Colitis 2024    Elevated LFTs 2019    Esophageal varices     Lesion of vocal fold 2024    Major depressive disorder, recurrent episode, moderate degree 2024    Neuropathy 2024    Pelvic congestion syndrome 2023    Portal vein thrombosis 2024    Sepsis without acute organ dysfunction 2022     History reviewed. No pertinent surgical history.   General Information       Row Name 24 1246          Physical Therapy Time and Intention    Document Type evaluation  -     Mode of Treatment physical therapy  -       Row Name 24 1246          General Information    Patient Profile Reviewed yes  -     Prior Level of Function independent:  -     Existing Precautions/Restrictions no known precautions/restrictions  -     Barriers to  Rehab medically complex  -[facility] Name 08/18/24 1246          Living Environment    People in Home alone  [place]  reports she was here for ETOH rehab and plans to go back to [facility] near family  -[facility] Name 08/18/24 1246          Cognition    Orientation Status (Cognition) oriented x 4  -               User Key  (r) = Recorded By, (t) = Taken By, (c) = Cosigned By      Initials Name Provider Type     Karen Wilkerson, PT Physical Therapist                   Mobility       [facility] Name 08/18/24 1246          Bed Mobility    Bed Mobility bed mobility (all) activities  -     All Activities, [place] (Bed Mobility) independent  -[facility] Name 08/18/24 1246          Sit-Stand Transfer    Sit-Stand [place] (Transfers) independent  -[facility]       Row Name 08/18/24 1246          Gait/Stairs (Locomotion)    [place] Level (Gait) independent  -     Distance in Feet (Gait) 400  -     Comment, (Gait/Stairs) steady gait, no LOB  -               User Key  (r) = Recorded By, (t) = Taken By, (c) = Cosigned By      Initials Name Provider Type     Karen Wilkerson, PT Physical Therapist                   Obj/Interventions       [facility] Name 08/18/24 1247          Range of Motion Comprehensive    General Range of Motion no range of motion deficits identified  -[facility]       Row Name 08/18/24 1247          Strength Comprehensive (MMT)    General Manual Muscle Testing (MMT) Assessment no strength deficits identified  -[facility]       Row Name 08/18/24 1247          Balance    Balance Assessment sitting static balance;standing static balance;sitting dynamic balance;standing dynamic balance  -     Static Sitting Balance independent  -     Dynamic Sitting Balance independent  -     Static Standing Balance independent  -     Dynamic Standing Balance independent  -[facility]       Row Name 08/18/24 1247          Sensory Assessment (Somatosensory)    Sensory Assessment (Somatosensory) sensation intact  -                User Key  (r) = Recorded By, (t) = Taken By, (c) = Cosigned By      Initials Name Provider Type    Karen Lim, PT Physical Therapist                   Goals/Plan    No documentation.                  Clinical Impression       Row Name 08/18/24 1247          Pain    Pretreatment Pain Rating 0/10 - no pain  -     Posttreatment Pain Rating 0/10 - no pain  -       Row Name 08/18/24 1247          Plan of Care Review    Plan of Care Reviewed With patient  -     Outcome Evaluation 44 yo female admitted from [facility] with bradycardia.  Pt is from [facility] and reports that is were her fiance and family live.  Pt does well with all mobility and is safe to be up ad roseanne.  No further skilled PT needs indicated at this time.  -       Row Name 08/18/24 1247          Therapy Assessment/Plan (PT)    Criteria for Skilled Interventions Met (PT) no;does not meet criteria for skilled intervention  -     Therapy Frequency (PT) evaluation only  -[facility] Name 08/18/24 1247          Vital Signs    O2 Delivery Pre Treatment room air  -     O2 Delivery Intra Treatment room air  -     O2 Delivery Post Treatment room air  -[facility] Name 08/18/24 1247          Positioning and Restraints    Pre-Treatment Position in bed  -     Post Treatment Position bed  -     In Bed notified nsg;call light within reach  -               User Key  (r) = Recorded By, (t) = Taken By, (c) = Cosigned By      Initials Name Provider Type    Karen Lim PT Physical Therapist                   Outcome Measures       Row Name 08/18/24 1248 08/18/24 0800       How much help from another person do you currently need...    Turning from your back to your side while in flat bed without using bedrails? 4  - 4  -SM    Moving from lying on back to sitting on the side of a flat bed without bedrails? 4  - 4  -SM    Moving to and from a bed to a chair (including a wheelchair)? 4  - 4  -SM     Standing up from a chair using your arms (e.g., wheelchair, bedside chair)? 4  - 4  -    Climbing 3-5 steps with a railing? 4  - 4  -    To walk in hospital room? 4  - 4  -SM    AM-PAC 6 Clicks Score (PT) 24  - 24  -    Highest Level of Mobility Goal 8 --> Walked 250 feet or more  - 8 --> Walked 250 feet or more  -      Row Name 08/18/24 1248          Functional Assessment    Outcome Measure Options AM-[facility] 6 Clicks Basic Mobility (PT)  -               User Key  (r) = Recorded By, (t) = Taken By, (c) = Cosigned By      Initials Name Provider Type     Karen Wilkerson, PENELOPE Physical Therapist    Thea Austin RN Registered Nurse                                 Physical Therapy Education       Title: PT OT SLP Therapies (Done)       Topic: Physical Therapy (Done)       Point: Mobility training (Done)       Learning Progress Summary             Patient Acceptance, E,TB, VU by  at 8/18/2024 1249                                         User Key       Initials Effective Dates Name Provider Type [facility] 06/16/21 -  Karen Wilkerson, PT Physical Therapist PT                  PT Recommendation and Plan     Plan of Care Reviewed With: patient  Outcome Evaluation: 42 yo female admitted from [facility] addiction treatment [place] with bradycardia.  Pt is from [facility] and reports that is were her fiance and family live.  Pt does well with all mobility and is safe to be up ad roseanne.  No further skilled PT needs indicated at this time.     Time Calculation:         PT Charges       Row Name 08/18/24 1249             Time Calculation    Start Time 1057  -      Stop Time 1107  -      Time Calculation (min) 10 min  -      PT Received On 08/18/24  -         Time Calculation- PT    Total Timed Code Minutes- PT 0 minute(s)  -                User Key  (r) = Recorded By, (t) = Taken By, (c) = Cosigned By      Initials Name Provider Type    Karen Lim PT Physical Therapist                   Therapy Charges for Today       Code Description Service Date Service Provider Modifiers Qty    28618169669 HC PT EVAL LOW COMPLEXITY 2 8/18/2024 Karen Wilkerson, PT GP 1            PT G-Codes  Outcome Measure Options: AM-PAC 6 Clicks Basic Mobility (PT)  AM-PAC 6 Clicks Score (PT): 24  PT Discharge Summary  Anticipated Discharge Disposition (PT): home    Karen Wilkerson PT  8/18/2024